# Patient Record
Sex: MALE | Race: WHITE | NOT HISPANIC OR LATINO | Employment: FULL TIME | URBAN - METROPOLITAN AREA
[De-identification: names, ages, dates, MRNs, and addresses within clinical notes are randomized per-mention and may not be internally consistent; named-entity substitution may affect disease eponyms.]

---

## 2017-09-11 ENCOUNTER — OFFICE VISIT (OUTPATIENT)
Dept: CARDIOLOGY | Facility: CLINIC | Age: 47
End: 2017-09-11

## 2017-09-11 VITALS
HEIGHT: 77 IN | SYSTOLIC BLOOD PRESSURE: 116 MMHG | WEIGHT: 252 LBS | HEART RATE: 54 BPM | DIASTOLIC BLOOD PRESSURE: 70 MMHG | BODY MASS INDEX: 29.76 KG/M2

## 2017-09-11 DIAGNOSIS — I48.0 PAF (PAROXYSMAL ATRIAL FIBRILLATION) (HCC): Primary | Chronic | ICD-10-CM

## 2017-09-11 PROCEDURE — 93000 ELECTROCARDIOGRAM COMPLETE: CPT | Performed by: INTERNAL MEDICINE

## 2017-09-11 PROCEDURE — 99203 OFFICE O/P NEW LOW 30 MIN: CPT | Performed by: INTERNAL MEDICINE

## 2017-09-11 RX ORDER — FLECAINIDE ACETATE 150 MG/1
450 TABLET ORAL AS NEEDED
COMMUNITY
Start: 2017-09-11 | End: 2017-09-11

## 2017-09-11 RX ORDER — FLECAINIDE ACETATE 150 MG/1
450 TABLET ORAL AS NEEDED
Qty: 9 TABLET | Refills: 5 | COMMUNITY
Start: 2017-09-11 | End: 2019-03-18 | Stop reason: ALTCHOICE

## 2017-09-11 NOTE — PATIENT INSTRUCTIONS
Atrial Fibrillation  Atrial fibrillation is a type of irregular or rapid heartbeat (arrhythmia). In atrial fibrillation, the heart quivers continuously in a chaotic pattern. This occurs when parts of the heart receive disorganized signals that make the heart unable to pump blood normally. This can increase the risk for stroke, heart failure, and other heart-related conditions. There are different types of atrial fibrillation, including:  · Paroxysmal atrial fibrillation. This type starts suddenly, and it usually stops on its own shortly after it starts.  · Persistent atrial fibrillation. This type often lasts longer than a week. It may stop on its own or with treatment.  · Long-lasting persistent atrial fibrillation. This type lasts longer than 12 months.  · Permanent atrial fibrillation. This type does not go away.  Talk with your health care provider to learn about the type of atrial fibrillation that you have.  CAUSES  This condition is caused by some heart-related conditions or procedures, including:  · A heart attack.  · Coronary artery disease.  · Heart failure.  · Heart valve conditions.  · High blood pressure.  · Inflammation of the sac that surrounds the heart (pericarditis).  · Heart surgery.  · Certain heart rhythm disorders, such as Mustafa-Parkinson-White syndrome.  Other causes include:  · Pneumonia.  · Obstructive sleep apnea.  · Blockage of an artery in the lungs (pulmonary embolism, or PE).  · Lung cancer.  · Chronic lung disease.  · Thyroid problems, especially if the thyroid is overactive (hyperthyroidism).  · Caffeine.  · Excessive alcohol use or illegal drug use.  · Use of some medicines, including certain decongestants and diet pills.  Sometimes, the cause cannot be found.  RISK FACTORS  This condition is more likely to develop in:  · People who are older in age.  · People who smoke.  · People who have diabetes mellitus.  · People who are overweight (obese).  · Athletes who exercise  vigorously.  SYMPTOMS  Symptoms of this condition include:  · A feeling that your heart is beating rapidly or irregularly.  · A feeling of discomfort or pain in your chest.  · Shortness of breath.  · Sudden light-headedness or weakness.  · Getting tired easily during exercise.  In some cases, there are no symptoms.  DIAGNOSIS  Your health care provider may be able to detect atrial fibrillation when taking your pulse. If detected, this condition may be diagnosed with:  · An electrocardiogram (ECG).  · A Holter monitor test that records your heartbeat patterns over a 24-hour period.  · Transthoracic echocardiogram (TTE) to evaluate how blood flows through your heart.  · Transesophageal echocardiogram (SONYA) to view more detailed images of your heart.  · A stress test.  · Imaging tests, such as a CT scan or chest X-ray.  · Blood tests.  TREATMENT  The main goals of treatment are to prevent blood clots from forming and to keep your heart beating at a normal rate and rhythm. The type of treatment that you receive depends on many factors, such as your underlying medical conditions and how you feel when you are experiencing atrial fibrillation.  This condition may be treated with:  · Medicine to slow down the heart rate, bring the heart's rhythm back to normal, or prevent clots from forming.  · Electrical cardioversion. This is a procedure that resets your heart's rhythm by delivering a controlled, low-energy shock to the heart through your skin.  · Different types of ablation, such as catheter ablation, catheter ablation with pacemaker, or surgical ablation. These procedures destroy the heart tissues that send abnormal signals. When the pacemaker is used, it is placed under your skin to help your heart beat in a regular rhythm.  HOME CARE INSTRUCTIONS  · Take over-the counter and prescription medicines only as told by your health care provider.  · If your health care provider prescribed a blood-thinning medicine  (anticoagulant), take it exactly as told. Taking too much blood-thinning medicine can cause bleeding. If you do not take enough blood-thinning medicine, you will not have the protection that you need against stroke and other problems.  · Do not use tobacco products, including cigarettes, chewing tobacco, and e-cigarettes. If you need help quitting, ask your health care provider.  · If you have obstructive sleep apnea, manage your condition as told by your health care provider.  · Do not drink alcohol.  · Do not drink beverages that contain caffeine, such as coffee, soda, and tea.  · Maintain a healthy weight. Do not use diet pills unless your health care provider approves. Diet pills may make heart problems worse.  · Follow diet instructions as told by your health care provider.  · Exercise regularly as told by your health care provider.  · Keep all follow-up visits as told by your health care provider. This is important.  PREVENTION  · Avoid drinking beverages that contain caffeine or alcohol.  · Avoid certain medicines, especially medicines that are used for breathing problems.  · Avoid certain herbs and herbal medicines, such as those that contain ephedra or ginseng.  · Do not use illegal drugs, such as cocaine and amphetamines.  · Do not smoke.  · Manage your high blood pressure.  SEEK MEDICAL CARE IF:  · You notice a change in the rate, rhythm, or strength of your heartbeat.  · You are taking an anticoagulant and you notice increased bruising.  · You tire more easily when you exercise or exert yourself.  SEEK IMMEDIATE MEDICAL CARE IF:  · You have chest pain, abdominal pain, sweating, or weakness.  · You feel nauseous.  · You notice blood in your vomit, bowel movement, or urine.  · You have shortness of breath.  · You suddenly have swollen feet and ankles.  · You feel dizzy.  · You have sudden weakness or numbness of the face, arm, or leg, especially on one side of the body.  · You have trouble speaking,  trouble understanding, or both (aphasia).  · Your face or your eyelid droops on one side.  These symptoms may represent a serious problem that is an emergency. Do not wait to see if the symptoms will go away. Get medical help right away. Call your local emergency services (911 in the [place] Do not drive yourself to the hospital.     This information is not intended to replace advice given to you by your health care provider. Make sure you discuss any questions you have with your health care provider.     Document Released: 12/18/2006 Document Revised: 09/07/2016 Document Reviewed: 04/13/2016  [employer] Patient Education ©2017 [employer]

## 2017-09-11 NOTE — PROGRESS NOTES
Subjective:     Encounter Date:09/11/2017      Patient ID: Alli Bello is a 47 y.o. male.    Chief Complaint:  History of Present Illness    Dear Dr. Ramírez,    I had the pleasure of seeing this patient in the office today for initial consultation.  He comes in for consultation regarding paroxysmal atrial fibrillation.  I appreciate the you sent him to see us.    He is a pleasant gentleman who has a long history of low atrial fibrillation.  He is been managed with a pill in the pocket technique with flecainide.  In the past he has been seen by Dr. Granado and Dr. Desiree Carpenter.  He was last seen in their office in 2015.  Typically, he is been instructed to take 350 mg tablets of flecainide if he goes into atrial fibrillation.  Additionally, he has samples of Bystolic and Pradaxa which she is also been instructed to take if he goes into atrial fibrillation.  In the past, he would also then be seen in the office if he had recurrent atrial fibrillation.  He has episodes atrial fibrillation extremely rarely.  Usually they start after he has done some extreme exercise.    About a month ago he had an episode of recurrent atrial fibrillation.  His current prescription bottle of flecainide was given for 100 mg tablets, not 150 mg tablets.  He took 3 as he has in the past, and it did not get him back into normal rhythm.  He then looked at the bottle a day later and noted that it was 100 mg tablets he was also supposed to take 4.  He was given 90 tablets of the 100 mg tablets of 49.  He then took 4 of the flecainide tablets and then he went back into sinus rhythm.  He did utilize the Bystolic in the meantime he felt better after he took the Bystolic because his heart was not racing.    He does snore heavily.  He says that Dr. Granado told in the past that they were concerned him up for a sleep study but then he never heard anything so he's never followed up on that.    The following portions of the patient's history were  reviewed and updated as appropriate: allergies, current medications, past family history, past medical history, past social history, past surgical history and problem list.    Past Medical History:   Diagnosis Date   • Anxiety attack    • Aortic stenosis    • Atrial fibrillation    • Deep venous thrombosis of calf     right   • THOMAS (dyspnea on exertion)     since fib started   • DVT (deep vein thrombosis) in pregnancy    • Excessive sleepiness     with history of snoring   • Fatigue     mild   • Lightheaded    • Malaise and fatigue    • PAC (premature atrial contraction)     very rare   • PAF (paroxysmal atrial fibrillation)    • Palpitations     gets lightheaded with it   • Pulmonary nodule    • Snoring     no testing   • SOB (shortness of breath)     mildly   • Syncope    • Syncope and collapse        History reviewed. No pertinent surgical history.    Social History     Social History   • Marital status: Single     Spouse name: N/A   • Number of children: N/A   • Years of education: N/A     Occupational History   • Not on file.     Social History Main Topics   • Smoking status: Never Smoker   • Smokeless tobacco: Not on file      Comment: caffine use   • Alcohol use Yes      Comment: 2-3 nights/week   • Drug use: No   • Sexual activity: Not on file     Other Topics Concern   • Not on file     Social History Narrative       Review of Systems   Constitution: Negative for chills, decreased appetite, fever and night sweats.   HENT: Negative for ear discharge, ear pain, hearing loss, nosebleeds and sore throat.    Eyes: Negative for blurred vision, double vision and pain.   Cardiovascular: Negative for cyanosis.   Respiratory: Negative for hemoptysis and sputum production.    Endocrine: Negative for cold intolerance and heat intolerance.   Hematologic/Lymphatic: Negative for adenopathy.   Skin: Negative for dry skin, itching, nail changes, rash and suspicious lesions.   Musculoskeletal: Negative for arthritis, gout,  "muscle cramps, muscle weakness, myalgias and neck pain.   Gastrointestinal: Negative for anorexia, bowel incontinence, constipation, diarrhea, dysphagia, hematemesis and jaundice.   Genitourinary: Negative for bladder incontinence, dysuria, flank pain, frequency, hematuria and nocturia.   Neurological: Negative for focal weakness, numbness, paresthesias and seizures.   Psychiatric/Behavioral: Negative for altered mental status, hallucinations, hypervigilance, suicidal ideas and thoughts of violence.   Allergic/Immunologic: Negative for persistent infections.         ECG 12 Lead  Date/Time: 9/11/2017 10:45 AM  Performed by: RAUL STEINBERG III.  Authorized by: RAUL STEINBERG III   Comparison: compared with previous ECG   Similar to previous ECG  Rhythm: sinus rhythm  Rate: normal  Conduction: conduction normal  ST Segments: ST segments normal  T Waves: T waves normal  QRS axis: normal  Other: no other findings  Clinical impression: normal ECG               Objective:     Vitals:    09/11/17 1015 09/11/17 1016   BP: 116/70 116/70   BP Location: Left arm Right arm   Pulse: 54    Weight: 252 lb (114 kg)    Height: 77\" (195.6 cm)          Physical Exam   Constitutional: He is oriented to person, place, and time. He appears well-developed and well-nourished. No distress.   HENT:   Head: Normocephalic and atraumatic.   Nose: Nose normal.   Mouth/Throat: Oropharynx is clear and moist.   Eyes: Conjunctivae and EOM are normal. Pupils are equal, round, and reactive to light. Right eye exhibits no discharge. Left eye exhibits no discharge.   Neck: Normal range of motion. Neck supple. No tracheal deviation present. No thyromegaly present.   Cardiovascular: Normal rate, regular rhythm, S1 normal, S2 normal, normal heart sounds and normal pulses.  Exam reveals no S3.    Pulmonary/Chest: Effort normal and breath sounds normal. No stridor. No respiratory distress. He exhibits no tenderness.   Abdominal: Soft. Bowel sounds are " normal. He exhibits no distension and no mass. There is no tenderness. There is no rebound and no guarding.   Musculoskeletal: Normal range of motion. He exhibits no tenderness or deformity.   Lymphadenopathy:     He has no cervical adenopathy.   Neurological: He is alert and oriented to person, place, and time. He has normal reflexes.   Skin: Skin is warm and dry. No rash noted. He is not diaphoretic. No erythema.   Psychiatric: He has a normal mood and affect. Thought content normal.       Lab Review:             Performed        Assessment:          Diagnosis Plan   1. PAF (paroxysmal atrial fibrillation)  Ambulatory Referral to Sleep Lab          Plan:       1. Atrial Fibrillation and Atrial Flutter  Assessment  • The patient has paroxysmal atrial fibrillation  • This is valvular in etiology  • The patient's CHADS2-VASc score is 0  • A [id number]-LZNn score of 0 is considered a low risk for a thromboembolic event    He is used a pill in the pocket technique with flecainide with many years.  He has samples for Pradaxa and Bystolic.  He is been instructed to take all 3 if he has an episode of atrial fibrillation and this has worked successfully for him.  This is a reasonable strategy and we will continue it.  Typically, he is been on flecainide 150 mg tablet to take 3 at the time of his breakthrough atrial fibrillation.  He states the last time that his flecainide was renewed, he was instead given 100 mg tablets but given 90, so he has a lot flecainide at home.  His current prescription on the bottle cyst take 400 mg, and so he will do so.  I told him that once that medicine expires, we can go back to the 150 mg tablet, 3 to be taken one time.    Additionally, he was told that because he snores heavily he should have a sleep study that then he was never contacted to schedule that.  That would be a reasonable option, so we will arrange for a sleep study to be performed.    Thank you very much for allowing us to  participate in the care of this pleasant patient.  Please don't hesitate to call if I can be of assistance in any way.      Current Outpatient Prescriptions:   •  CITALOPRAM HYDROBROMIDE PO, Take  by mouth., Disp: , Rfl:   •  flecainide (TAMBOCOR) 150 MG tablet, Take 3 tablets by mouth As Needed., Disp: 9 tablet, Rfl: 5

## 2017-10-16 ENCOUNTER — OFFICE VISIT (OUTPATIENT)
Dept: SLEEP MEDICINE | Facility: HOSPITAL | Age: 47
End: 2017-10-16
Attending: INTERNAL MEDICINE

## 2017-10-16 VITALS
WEIGHT: 254 LBS | DIASTOLIC BLOOD PRESSURE: 79 MMHG | SYSTOLIC BLOOD PRESSURE: 133 MMHG | HEIGHT: 74 IN | BODY MASS INDEX: 32.6 KG/M2 | HEART RATE: 56 BPM

## 2017-10-16 DIAGNOSIS — R06.83 SNORES: Primary | ICD-10-CM

## 2017-10-16 DIAGNOSIS — G47.33 OSA (OBSTRUCTIVE SLEEP APNEA): ICD-10-CM

## 2017-10-16 DIAGNOSIS — G47.10 HYPERSOMNIA: ICD-10-CM

## 2017-10-16 DIAGNOSIS — I48.0 PAF (PAROXYSMAL ATRIAL FIBRILLATION) (HCC): Chronic | ICD-10-CM

## 2017-10-16 DIAGNOSIS — E66.9 OBESITY (BMI 30.0-34.9): ICD-10-CM

## 2017-10-16 PROBLEM — E66.811 OBESITY (BMI 30.0-34.9): Status: ACTIVE | Noted: 2017-10-16

## 2017-10-16 PROCEDURE — G0463 HOSPITAL OUTPT CLINIC VISIT: HCPCS

## 2017-10-16 NOTE — PROGRESS NOTES
Sleep Consultation    Patient Name: Alli Bello  Age/Sex: 47 y.o. male  : 1970  MRN: [id number]    Date of Encounter Visit: 10/22/17  Encounter Provider: Rylee Sanchez MD  Referring Provider:  Aroldo Kelly III, MD  Place of Service: [facility]  Patient Care Team:  aRshida Ramírez MD as PCP - General  Rashida Ramírez MD as PCP - Family Medicine    Subjective:     Reason for Consult: Snoring and history of paroxysmal atrial fibrillation    History of Present Illness:  Alli Bello is a 47 y.o. male is here for evaluation of MIKA due to complaints of snoring for many years and was diagnosed with paroxysmal atrial fibrillation.  Please see sleep questionnaire on page 2 for more details.     Patient complains of daytime fatigue and sleepiness with an [place] Sleepiness Scale (ESS) of 1.  Patient complains of loud snoring.    Patient denies any witnessed apneic events.  Denies any restless sleep or frequent awakenings.  Denies any pain at night or leg jerking or other symptoms to suggest restless leg syndrome.  Patient denies any cataplexy, sleep paralysis or other symptoms to suggest narcolepsy.  Patient denies any parasomnias.  Denies any history of seizure disorder or recent head trauma.  Patient spends adequate amount of time in bed with no evidence of sleep restriction or improper sleep hygiene.  Patient usually goes to bed around 11:30 PM and gets up at 7 AM averaging 7-1/2 hours of sleep per night.  On the weekends he'll go to bed around 1 AM and wake up at 7 AM getting about 7 hours of sleep.  He reports that it takes about 15-20 minutes to fall sleep, but he wakes up feeling tired.  He denies taking any naps.  He denies any rotating her night shift work.   Comorbidities include: Paroxysmal atrial fibrillation and obesity.    Review of Systems:   Positive for irregular heartbeat and anxiety.  A twelve-system review was conducted and was otherwise negative.    Please refer to page 4 of on the sleep questionnaire for more details on system review findings.    Past Medical History:  Past Medical History:   Diagnosis Date   • Anxiety attack    • Aortic stenosis    • Atrial fibrillation    • Deep venous thrombosis of calf     right   • THOMAS (dyspnea on exertion)     since fib started   • DVT (deep vein thrombosis) in pregnancy    • Excessive sleepiness     with history of snoring   • Fatigue     mild   • Lightheaded    • Malaise and fatigue    • PAC (premature atrial contraction)     very rare   • PAF (paroxysmal atrial fibrillation)    • Palpitations     gets lightheaded with it   • Pulmonary nodule    • Snoring     no testing   • SOB (shortness of breath)     mildly   • Syncope    • Syncope and collapse        No past surgical history on file.    Home Medications:     Current Outpatient Prescriptions:   •  CITALOPRAM HYDROBROMIDE PO, Take  by mouth., Disp: , Rfl:   •  flecainide (TAMBOCOR) 150 MG tablet, Take 3 tablets by mouth As Needed., Disp: 9 tablet, Rfl: 5    Allergies:  No Known Allergies    Past Social History:  Social History     Social History   • Marital status: Single     Spouse name: N/A   • Number of children: N/A   • Years of education: N/A     Occupational History   •       Social History Main Topics   • Smoking status: Never Smoker   • Smokeless tobacco: None      Comment: caffine use   • Alcohol use 3.0 - 3.6 oz/week     5 - 6 Standard drinks or equivalent per week      Comment: 2-3 nights/week   • Drug use: No      Comment: Reports drinking one caffeinated beverage per day   • Sexual activity: Not Asked     Other Topics Concern   • None     Social History Narrative       Past Family History:  Family History   Problem Relation Age of Onset   • Heart disease Father    • Hypertension Father    • Coronary artery disease Father      CAD/CHD <61yo   • Hyperlipidemia Father    • Heart attack Father    • Stroke Paternal Grandmother    • Arrhythmia  "Mother    • Atrial fibrillation Mother    • Arrhythmia Brother    • Atrial fibrillation Brother        Objective:   Done and documented on sleep disorders center physical examination sheet   Vital Signs:   Visit Vitals   • /79   • Pulse 56   • Ht 74\" (188 cm)   • Wt 254 lb (115 kg)   • BMI 32.61 kg/m2     Wt Readings from Last 3 Encounters:   10/16/17 254 lb (115 kg)   09/11/17 252 lb (114 kg)     Neck Circumference: 16.5 inches    Physical Exam:   General: AAOx3. Normal mood and affect.   HEENT:  Conjunctiva normal.  PERRLA.  Moist mucous membranes.  Septum midline. Mallampati 3 airways. Normal tongue. Edematous uvula. Normal tonsils. Neck supple trachea midline. Normal thyroid.  LUNGS: Non-labored breathing. CTAB.  No wheezing  HEART: regular rate and rhythm. No murmur. Normal s1/s2  EXTREMITIES: no edema. No cyanosis or clubbing. Normal gait    Diagnostic Data:  none    Assessment and Plan:       ICD-10-CM ICD-9-CM   1. Snores R06.83 786.09   2. PAF (paroxysmal atrial fibrillation) I48.0 427.31   3. Obesity (BMI 30.0-34.9) E66.9 278.00   4. MIKA (obstructive sleep apnea) G47.33 327.23   5. Hypersomnia G47.10 780.54       Recommendations:     Patient was educated in depth about MIKA and cardiovascular consequences if left untreated, including but not limited to CHF, CAD, arrhythmias, CVA, and/or HTN. Education also provided about the diagnostic tools for MIKA, including the polysomnography and the treatment modalities, including the CPAP. Adherence to the CPAP is a key factor in successful treatment of MIKA and the patient was encouraged to contact us in case of problem with the CPAP or the mask that can limit the tolerance of the compliance with the therapy.    Encourage weight loss as can help reduce symptoms of sleep apnea and improve overall health.     Will do a home sleep study and treat accordingly.    Orders Placed This Encounter   Procedures   • Home Sleep Study       Return in about 3 months (around " 1/16/2018).    Rylee Sanchez MD   [facility]   10/22/17  1:35 PM    EMR Dragon/Transcription disclaimer:   Much of this encounter note is an electronic transcription/translation of spoken language to printed text. The electronic translation of spoken language may permit erroneous, or at times, nonsensical words or phrases to be inadvertently transcribed; Although I have reviewed the note for such errors, some may still exist.

## 2017-11-09 ENCOUNTER — HOSPITAL ENCOUNTER (OUTPATIENT)
Dept: SLEEP MEDICINE | Facility: HOSPITAL | Age: 47
Discharge: HOME | End: 2017-11-09
Admitting: INTERNAL MEDICINE

## 2017-11-09 DIAGNOSIS — I48.0 PAF (PAROXYSMAL ATRIAL FIBRILLATION) (HCC): Chronic | ICD-10-CM

## 2017-11-09 DIAGNOSIS — G47.33 OSA (OBSTRUCTIVE SLEEP APNEA): ICD-10-CM

## 2017-11-09 DIAGNOSIS — R06.83 SNORES: ICD-10-CM

## 2017-11-09 DIAGNOSIS — E66.9 OBESITY (BMI 30.0-34.9): ICD-10-CM

## 2017-11-09 PROCEDURE — 95806 SLEEP STUDY UNATT&RESP EFFT: CPT

## 2017-11-17 ENCOUNTER — TELEPHONE (OUTPATIENT)
Dept: SLEEP MEDICINE | Facility: HOSPITAL | Age: 47
End: 2017-11-17

## 2017-11-17 NOTE — TELEPHONE ENCOUNTER
Spoke with patient about sleep study results.  Faxed orders to [facility] sleep and emailed them to let them know it was sent.

## 2018-03-30 ENCOUNTER — APPOINTMENT (OUTPATIENT)
Dept: SLEEP MEDICINE | Facility: HOSPITAL | Age: 48
End: 2018-03-30
Attending: INTERNAL MEDICINE

## 2018-04-20 ENCOUNTER — APPOINTMENT (OUTPATIENT)
Dept: SLEEP MEDICINE | Facility: HOSPITAL | Age: 48
End: 2018-04-20
Attending: INTERNAL MEDICINE

## 2018-05-04 ENCOUNTER — OFFICE VISIT (OUTPATIENT)
Dept: SLEEP MEDICINE | Facility: HOSPITAL | Age: 48
End: 2018-05-04
Attending: INTERNAL MEDICINE

## 2018-05-04 VITALS
DIASTOLIC BLOOD PRESSURE: 75 MMHG | BODY MASS INDEX: 31.66 KG/M2 | SYSTOLIC BLOOD PRESSURE: 115 MMHG | HEART RATE: 58 BPM | HEIGHT: 76 IN | WEIGHT: 260 LBS

## 2018-05-04 DIAGNOSIS — I48.0 PAF (PAROXYSMAL ATRIAL FIBRILLATION) (HCC): Chronic | ICD-10-CM

## 2018-05-04 DIAGNOSIS — E66.9 OBESITY (BMI 30-39.9): ICD-10-CM

## 2018-05-04 DIAGNOSIS — G47.33 OSA ON CPAP: Primary | ICD-10-CM

## 2018-05-04 DIAGNOSIS — Z99.89 OSA ON CPAP: Primary | ICD-10-CM

## 2018-05-04 PROCEDURE — G0463 HOSPITAL OUTPT CLINIC VISIT: HCPCS

## 2018-05-04 NOTE — PROGRESS NOTES
Sleep Disorder Follow Up    Patient Name: Alli Bello  Age/Sex: 48 y.o. male  : 1970  MRN: [id number]    Date of Encounter Visit: 2018   Referring Provider: Rylee Sanchez MD  Place of Service: [facility]  Patient Care Team:  Rashida Ramírez MD as PCP - General  Rashida Ramírez MD as PCP - Family Medicine    PROBLEM LIST:  1. Moderate MIKA, on CPAP  2. History of paroxysmal atrial fibrillation  3. No significant Hypersomnia    History of Present Illness:  Alli Bello is a 48 y.o. male who was last seen in the office on 10/16/17 for P-Afib and suspected MIKA .    Since last visit, patient had a home sleep study that was done on 11/10/17 and it was positive for moderate obstructive sleep apnea and the patient was started on auto CPAP 8-20.  Patient is compliant with the CPAP, he is using it because he wants to and he denies any significant improvement really because he did not have any significant hypersomnia to start with, his [place] Sleepiness Scale on today's visit is 0 and it was in the normal range on the last visit however he has no problem using it or continue to use it given the potential medical benefit from using it.  .  Patient is on auto CPAP and uses it every night with no complaints from the mask or the pressure.  Patient uses a face mask, which does  fit well. Patient denies any significant complaint from air leak.  Or dry mouth.   Patient's equipment supplier is [employer] and last mask replacement was on the initial set up.  Patient sleeps better and has a deeper sleep with the CPAP and feels more energy during the day time.  Current  CPAP setting auto 8-20 cm H20.  [place] Sleepiness Scale (ESS) is 0.  Compliance download was reviewed and documented below.  Weight is up by 5 pounds since last visit.  Other comorbidities include paroxysmal atrial fibrillation     Review of Systems:   A ten-system review was conducted and was negative .    Please refer to the follow up sleep questionnaire page one for details.    Past Medical History:  Past medical, surgical, social, and family history, except as mentioned above, was unchanged from the last visit.     Past Medical History:   Diagnosis Date   • Anxiety attack    • Aortic stenosis    • Atrial fibrillation    • Deep venous thrombosis of calf     right   • THOMAS (dyspnea on exertion)     since fib started   • DVT (deep vein thrombosis) in pregnancy    • Excessive sleepiness     with history of snoring   • Fatigue     mild   • Lightheaded    • Malaise and fatigue    • PAC (premature atrial contraction)     very rare   • PAF (paroxysmal atrial fibrillation)    • Palpitations     gets lightheaded with it   • Pulmonary nodule    • Snoring     no testing   • SOB (shortness of breath)     mildly   • Syncope    • Syncope and collapse        No past surgical history on file.    Home Medications:     Current Outpatient Prescriptions:   •  CITALOPRAM HYDROBROMIDE PO, Take  by mouth., Disp: , Rfl:   •  flecainide (TAMBOCOR) 150 MG tablet, Take 3 tablets by mouth As Needed., Disp: 9 tablet, Rfl: 5    Allergies:  No Known Allergies    Past Social History:  Social History     Social History   • Marital status: Single     Occupational History   •       Social History Main Topics   • Smoking status: Never Smoker      Comment: caffine use   • Alcohol use 3.0 - 3.6 oz/week     5 - 6 Standard drinks or equivalent per week      Comment: 2-3 nights/week   • Drug use: No      Comment: Reports drinking one caffeinated beverage per day     Other Topics Concern   • Not on file       Past Family History:  Family History   Problem Relation Age of Onset   • Heart disease Father    • Hypertension Father    • Coronary artery disease Father      CAD/CHD <59yo   • Hyperlipidemia Father    • Heart attack Father    • Stroke Paternal Grandmother    • Arrhythmia Mother    • Atrial fibrillation Mother    • Arrhythmia Brother    •  "Atrial fibrillation Brother          Objective:   Done and documented on sleep disorders center physical examination sheet, please refer to hand written note on the chart for details about the other pertinent negative findings.    Vital Signs:   Visit Vitals  /75   Pulse 58   Ht 193 cm (76\")   Wt 118 kg (260 lb)   BMI 31.65 kg/m²     Wt Readings from Last 3 Encounters:   05/04/18 118 kg (260 lb)   10/16/17 115 kg (254 lb)   09/11/17 114 kg (252 lb)          Physical Exam:   General: AAOx3. Normal mood and affect.   HEENT:  Conjunctiva normal.  PERRLA.  Moist mucous membranes.  Septum midline. Mallampati 3 airways. Normal tongue. Edematous uvula. Normal tonsils. Neck supple trachea midline. Normal thyroid.  LUNGS: Non-labored breathing. CTAB.  No wheezing  HEART: regular rate and rhythm. No murmur. Normal s1/s2  EXTREMITIES: no edema. No cyanosis or clubbing. Normal gait    Diagnostic Data:  Respiratory Disturbance Events:   Overnight unattended home sleep study was positive for moderate MIKA with AHI of 28.7 with significant positional component and AHI of 39.0 in supine sleep versus normal AHI and other positions.  Clinically insignificant sleep related hypoxemia, despite the desaturation down to 85% during those apnea episode patient had only 1.4 minutes in the hypoxemic range below 89%  Severe snoring patient was recorded snoring during 62.4% of total recording time    Impression:   1. Moderate MIKA with minimal and significant hypoxemia  2. History of paroxysmal atrial fibrillation   Plan:   · Given the severity of the sleep apnea and the underlying comorbidities with paroxysmal atrial fibrillation, treatment is indicated  · Start auto CPAP 8-20 cmH2O  · Follow-up on the clinical response and the compliance download with further modification accordingly        Compliance download :from last 30 days showed 80% compliance with an average nightly use of 6 hours and 5 minutes on auto CPAP 8-20 with a median " pressure of 9.5 and 95th percentile less than 11.7.  Athlete was minimal to none with a median leak of 0.5 L/m and 95th percentile less than 12.6 L/m.  The sleep apnea was very well controlled with residual AHI of 1.1.      Assessment and Plan:       ICD-10-CM ICD-9-CM   1. MIKA on CPAP G47.33 327.23    Z99.89 V46.8   2. Obesity (BMI 30-39.9) E66.9 278.00   3. PAF (paroxysmal atrial fibrillation) I48.0 427.31       Recommendations:     Patient did not have much symptoms to start with so no subjective improvement on the CPAP however he denies any significant discomfort from it and he does not mind to continue to use it given the potential medical benefit from it.  We reviewed the results of the sleep study together with the patient and I explained the abnormal finding and the potential benefit from the CPAP, we reviewed the CPAP download and we pointed out to the patient the improvement in the residual AHI which is down to 1.1.  Given the adequate compliance and the anticipated medical benefit from the CPAP with pre-existing comorbidities like paroxysmal atrial fibrillation the decision is to continue with the CPAP and the patient is agreeable to do so  Patient is to work up on the weight loss  We'll follow-up on a yearly basis or sooner as needed with the instruction to contact me if he became unable to use a CPAP for whatever reason.    No orders of the defined types were placed in this encounter.    Return in about 1 year (around 5/4/2019).    Rylee Sanchez MD   [facility]   05/04/18  12:09 PM    Dictated utilizing Dragon dictation:  Much of this encounter note is an electronic transcription/translation of spoken language to printed text. The electronic translation of spoken language may permit erroneous, or at times, nonsensical words or phrases to be inadvertently transcribed; Although I have reviewed the note for such errors, some may still exist.

## 2019-03-18 ENCOUNTER — OFFICE VISIT (OUTPATIENT)
Dept: ORTHOPEDIC SURGERY | Facility: CLINIC | Age: 49
End: 2019-03-18

## 2019-03-18 ENCOUNTER — HOSPITAL ENCOUNTER (OUTPATIENT)
Dept: MRI IMAGING | Facility: HOSPITAL | Age: 49
Discharge: HOME | End: 2019-03-18
Admitting: NURSE PRACTITIONER

## 2019-03-18 VITALS
WEIGHT: 250 LBS | SYSTOLIC BLOOD PRESSURE: 102 MMHG | DIASTOLIC BLOOD PRESSURE: 62 MMHG | HEART RATE: 80 BPM | HEIGHT: 76 IN | BODY MASS INDEX: 30.44 KG/M2

## 2019-03-18 DIAGNOSIS — S86.002A ACHILLES TENDON INJURY, LEFT, INITIAL ENCOUNTER: ICD-10-CM

## 2019-03-18 DIAGNOSIS — R52 PAIN: Primary | ICD-10-CM

## 2019-03-18 PROCEDURE — 73610 X-RAY EXAM OF ANKLE: CPT | Performed by: NURSE PRACTITIONER

## 2019-03-18 PROCEDURE — 73721 MRI JNT OF LWR EXTRE W/O DYE: CPT

## 2019-03-18 PROCEDURE — 99203 OFFICE O/P NEW LOW 30 MIN: CPT | Performed by: NURSE PRACTITIONER

## 2019-03-18 RX ORDER — CITALOPRAM 20 MG/1
20 TABLET ORAL EVERY MORNING
Refills: 3 | COMMUNITY
Start: 2019-03-02 | End: 2021-04-30 | Stop reason: SDUPTHER

## 2019-03-18 NOTE — PROGRESS NOTES
Subjective:     Patient ID: Alli Bello is a 49 y.o. male.    Chief Complaint:  Pain at achilles tendon, left  History of Present Illness  Alli Bello is a 49-year-old male who presents with a 24-hour onset of pain at the posterior aspect of the ankle, Achilles tendon.  He was playing volleyball last p.m. when he felt a sudden pop at the posterior aspect and began experiencing pain and swelling.  He has elevated, rested, iced and taken ibuprofen for symptom relief.  Increased pain with all ambulatory activities and symptom relief with rest.  Denies that he is expensing numbness or tingling at the left lower extremity.  Positive for multiple ankle injuries at the left and right ankles in the past however denies known injury to his Achilles tendon in the past.  He has continued to ambulate with crutches again nonweightbearing to the left lower extremity tolerating fairly well.  Denies presence of numbness and tingling at the left lower extremity.  Denies other concerns present at this time.     Social History     Occupational History   • Occupation:    Tobacco Use   • Smoking status: Never Smoker   • Smokeless tobacco: Never Used   • Tobacco comment: caffine use   Substance and Sexual Activity   • Alcohol use: Yes     Alcohol/week: 3.0 - 3.6 oz     Types: 5 - 6 Standard drinks or equivalent per week     Comment: 2-3 nights/week   • Drug use: No     Comment: Reports drinking one caffeinated beverage per day   • Sexual activity: Defer      Past Medical History:   Diagnosis Date   • Anxiety attack    • Aortic stenosis    • Atrial fibrillation (CMS/HCC)    • Deep venous thrombosis of calf (CMS/HCC)     right   • THOMAS (dyspnea on exertion)     since fib started   • DVT (deep vein thrombosis) in pregnancy (CMS/HCC)    • Excessive sleepiness     with history of snoring   • Fatigue     mild   • Lightheaded    • Malaise and fatigue    • PAC (premature atrial contraction)     very rare   • PAF (paroxysmal atrial  fibrillation) (CMS/HCC)    • Palpitations     gets lightheaded with it   • Pulmonary nodule    • Snoring     no testing   • SOB (shortness of breath)     mildly   • Syncope    • Syncope and collapse      History reviewed. No pertinent surgical history.    Family History   Problem Relation Age of Onset   • Heart disease Father    • Hypertension Father    • Coronary artery disease Father         CAD/CHD <59yo   • Hyperlipidemia Father    • Heart attack Father    • Stroke Paternal Grandmother    • Arrhythmia Mother    • Atrial fibrillation Mother    • Arrhythmia Brother    • Atrial fibrillation Brother          Review of Systems   Constitutional: Negative for chills, diaphoresis, fever and unexpected weight change.   HENT: Negative for hearing loss, nosebleeds, sore throat and tinnitus.    Eyes: Negative for pain and visual disturbance.   Respiratory: Negative for cough, shortness of breath and wheezing.    Cardiovascular: Negative for chest pain and palpitations.   Gastrointestinal: Negative for abdominal pain, diarrhea, nausea and vomiting.   Endocrine: Negative for cold intolerance, heat intolerance and polydipsia.   Genitourinary: Negative for difficulty urinating, dysuria and hematuria.   Musculoskeletal: Positive for myalgias. Negative for arthralgias.   Skin: Negative for rash and wound.   Allergic/Immunologic: Negative for environmental allergies.   Neurological: Negative for dizziness, syncope and numbness.   Hematological: Does not bruise/bleed easily.   Psychiatric/Behavioral: Negative for dysphoric mood and sleep disturbance. The patient is not nervous/anxious.            Objective:  Physical Exam    Vital signs reviewed.   General: No acute distress.  Eyes: conjunctiva clear; pupils equally round and reactive  ENT: external ears and nose atraumatic; oropharynx clear  CV: no peripheral edema  Resp: normal respiratory effort  Skin: no rashes or wounds; normal turgor  Psych: mood and affect appropriate;  "recent and remote memory intact    Vitals:    03/18/19 1413   BP: 102/62   BP Location: Left arm   Pulse: 80   Weight: 113 kg (250 lb)   Height: 193 cm (76\")         03/18/19  1413   Weight: 113 kg (250 lb)     Body mass index is 30.43 kg/m².      Ortho Exam     Left ankle:  Maximal tenderness over Achilles tendon  Plantar flexion strength 4/5 compared to right side  Positive gonzalez's squeeze test  Positive sensation all distributions left foot, ankle and lower leg with light palpation   2+ DP and pedal pulse  Flex/extend all digits left foot  Mild swelling noted    Imaging:  Left Ankle X-Ray  Indication: Pain  Views: AP, Lateral, Mortise  Findings:  No fracture  No bony lesion  Soft tissues normal  Normal joint spaces    No prior studies available for comparison.    Assessment:        1. Pain    2. Achilles tendon injury, left, initial encounter           Plan:  1.  Discussed plan of care with patient.  Will proceed with MRI of the ankle, stat.  We will plan to have him follow-up with Dr. Baez after completion of testing to discuss results and further plan of care at that time.  Patient verbalized understanding of all information agrees with plan of care encouraged to continue with nonweightbearing to the left lower extremity with crutches.  Denies other concerns that he has at this time.  Orders:  Orders Placed This Encounter   Procedures   • XR Ankle 3+ View Left   • MRI Ankle Left Without Contrast       I ordered and reviewed the FRANCIS today.     Dictated utilizing Dragon dictation   "

## 2019-03-20 ENCOUNTER — TELEPHONE (OUTPATIENT)
Dept: ORTHOPEDIC SURGERY | Facility: CLINIC | Age: 49
End: 2019-03-20

## 2019-03-21 ENCOUNTER — APPOINTMENT (OUTPATIENT)
Dept: MRI IMAGING | Facility: HOSPITAL | Age: 49
End: 2019-03-21

## 2019-03-22 ENCOUNTER — PREP FOR SURGERY (OUTPATIENT)
Dept: OTHER | Facility: HOSPITAL | Age: 49
End: 2019-03-22

## 2019-03-22 ENCOUNTER — TELEPHONE (OUTPATIENT)
Dept: ORTHOPEDIC SURGERY | Facility: CLINIC | Age: 49
End: 2019-03-22

## 2019-03-22 DIAGNOSIS — S86.012A ACHILLES RUPTURE, LEFT: Primary | ICD-10-CM

## 2019-03-22 RX ORDER — OXYCODONE HCL 10 MG/1
10 TABLET, FILM COATED, EXTENDED RELEASE ORAL ONCE
Status: CANCELLED | OUTPATIENT
Start: 2019-03-22 | End: 2019-03-22

## 2019-03-22 RX ORDER — MELOXICAM 7.5 MG/1
15 TABLET ORAL ONCE
Status: CANCELLED | OUTPATIENT
Start: 2019-03-22 | End: 2019-03-22

## 2019-03-22 RX ORDER — CEFAZOLIN SODIUM 2 G/50ML
2 SOLUTION INTRAVENOUS ONCE
Status: CANCELLED | OUTPATIENT
Start: 2019-03-22 | End: 2019-03-22

## 2019-03-22 RX ORDER — ACETAMINOPHEN 500 MG
1000 TABLET ORAL ONCE
Status: CANCELLED | OUTPATIENT
Start: 2019-03-22 | End: 2019-03-22

## 2019-03-25 ENCOUNTER — TELEPHONE (OUTPATIENT)
Dept: ORTHOPEDIC SURGERY | Facility: CLINIC | Age: 49
End: 2019-03-25

## 2019-03-25 ENCOUNTER — APPOINTMENT (OUTPATIENT)
Dept: PREADMISSION TESTING | Facility: HOSPITAL | Age: 49
End: 2019-03-25

## 2019-03-25 ENCOUNTER — OFFICE VISIT (OUTPATIENT)
Dept: CARDIOLOGY | Facility: CLINIC | Age: 49
End: 2019-03-25

## 2019-03-25 VITALS
WEIGHT: 262.4 LBS | BODY MASS INDEX: 31.95 KG/M2 | HEIGHT: 76 IN | SYSTOLIC BLOOD PRESSURE: 106 MMHG | DIASTOLIC BLOOD PRESSURE: 68 MMHG | HEART RATE: 67 BPM | RESPIRATION RATE: 16 BRPM | OXYGEN SATURATION: 97 %

## 2019-03-25 VITALS
HEIGHT: 76 IN | WEIGHT: 264 LBS | HEART RATE: 63 BPM | SYSTOLIC BLOOD PRESSURE: 118 MMHG | DIASTOLIC BLOOD PRESSURE: 80 MMHG | BODY MASS INDEX: 32.15 KG/M2

## 2019-03-25 DIAGNOSIS — S86.012A ACHILLES RUPTURE, LEFT: ICD-10-CM

## 2019-03-25 DIAGNOSIS — Z01.810 PREOP CARDIOVASCULAR EXAM: ICD-10-CM

## 2019-03-25 DIAGNOSIS — I48.0 PAF (PAROXYSMAL ATRIAL FIBRILLATION) (HCC): Primary | Chronic | ICD-10-CM

## 2019-03-25 DIAGNOSIS — Z99.89 OSA ON CPAP: ICD-10-CM

## 2019-03-25 DIAGNOSIS — G47.33 OSA ON CPAP: ICD-10-CM

## 2019-03-25 LAB
ANION GAP SERPL CALCULATED.3IONS-SCNC: 10.7 MMOL/L
APTT PPP: 26.9 SECONDS (ref 24.3–38.1)
BASOPHILS # BLD AUTO: 0.03 10*3/MM3 (ref 0–0.2)
BASOPHILS NFR BLD AUTO: 0.5 % (ref 0–1.5)
BUN BLD-MCNC: 16 MG/DL (ref 6–20)
BUN/CREAT SERPL: 18.4 (ref 7–25)
CALCIUM SPEC-SCNC: 9 MG/DL (ref 8.6–10.5)
CHLORIDE SERPL-SCNC: 105 MMOL/L (ref 98–107)
CO2 SERPL-SCNC: 22.3 MMOL/L (ref 22–29)
CREAT BLD-MCNC: 0.87 MG/DL (ref 0.76–1.27)
DEPRECATED RDW RBC AUTO: 38.5 FL (ref 37–54)
EOSINOPHIL # BLD AUTO: 0.08 10*3/MM3 (ref 0–0.4)
EOSINOPHIL NFR BLD AUTO: 1.3 % (ref 0.3–6.2)
ERYTHROCYTE [DISTWIDTH] IN BLOOD BY AUTOMATED COUNT: 12 % (ref 12.3–15.4)
GFR SERPL CREATININE-BSD FRML MDRD: 94 ML/MIN/1.73
GLUCOSE BLD-MCNC: 107 MG/DL (ref 65–99)
HCT VFR BLD AUTO: 44 % (ref 37.5–51)
HGB BLD-MCNC: 14.8 G/DL (ref 13–17.7)
IMM GRANULOCYTES # BLD AUTO: 0.01 10*3/MM3 (ref 0–0.05)
IMM GRANULOCYTES NFR BLD AUTO: 0.2 % (ref 0–0.5)
INR PPP: 0.9 (ref 0.9–1.1)
LYMPHOCYTES # BLD AUTO: 2.19 10*3/MM3 (ref 0.7–3.1)
LYMPHOCYTES NFR BLD AUTO: 35.4 % (ref 19.6–45.3)
MCH RBC QN AUTO: 29.3 PG (ref 26.6–33)
MCHC RBC AUTO-ENTMCNC: 33.6 G/DL (ref 31.5–35.7)
MCV RBC AUTO: 87.1 FL (ref 79–97)
MONOCYTES # BLD AUTO: 0.57 10*3/MM3 (ref 0.1–0.9)
MONOCYTES NFR BLD AUTO: 9.2 % (ref 5–12)
NEUTROPHILS # BLD AUTO: 3.31 10*3/MM3 (ref 1.4–7)
NEUTROPHILS NFR BLD AUTO: 53.4 % (ref 42.7–76)
NRBC BLD AUTO-RTO: 0 /100 WBC (ref 0–0)
PLATELET # BLD AUTO: 128 10*3/MM3 (ref 140–450)
PMV BLD AUTO: 10.8 FL (ref 6–12)
POTASSIUM BLD-SCNC: 4.5 MMOL/L (ref 3.5–5.2)
PROTHROMBIN TIME: 11.9 SECONDS (ref 12.1–15)
RBC # BLD AUTO: 5.05 10*6/MM3 (ref 4.14–5.8)
SODIUM BLD-SCNC: 138 MMOL/L (ref 136–145)
WBC NRBC COR # BLD: 6.19 10*3/MM3 (ref 3.4–10.8)

## 2019-03-25 PROCEDURE — 36415 COLL VENOUS BLD VENIPUNCTURE: CPT

## 2019-03-25 PROCEDURE — 80048 BASIC METABOLIC PNL TOTAL CA: CPT | Performed by: ORTHOPAEDIC SURGERY

## 2019-03-25 PROCEDURE — 85025 COMPLETE CBC W/AUTO DIFF WBC: CPT | Performed by: ORTHOPAEDIC SURGERY

## 2019-03-25 PROCEDURE — 85610 PROTHROMBIN TIME: CPT | Performed by: ORTHOPAEDIC SURGERY

## 2019-03-25 PROCEDURE — 99213 OFFICE O/P EST LOW 20 MIN: CPT | Performed by: INTERNAL MEDICINE

## 2019-03-25 PROCEDURE — 93010 ELECTROCARDIOGRAM REPORT: CPT | Performed by: INTERNAL MEDICINE

## 2019-03-25 PROCEDURE — 85730 THROMBOPLASTIN TIME PARTIAL: CPT | Performed by: ORTHOPAEDIC SURGERY

## 2019-03-25 PROCEDURE — 93005 ELECTROCARDIOGRAM TRACING: CPT

## 2019-03-25 RX ORDER — FLECAINIDE ACETATE 100 MG/1
450 TABLET ORAL
COMMUNITY
End: 2022-09-23 | Stop reason: DRUGHIGH

## 2019-03-25 RX ORDER — NEBIVOLOL 10 MG/1
10 TABLET ORAL DAILY PRN
COMMUNITY
End: 2022-04-14

## 2019-03-25 RX ORDER — LORAZEPAM 0.5 MG/1
0.25 TABLET ORAL EVERY 8 HOURS PRN
COMMUNITY
End: 2021-04-30 | Stop reason: SDUPTHER

## 2019-03-25 RX ORDER — DABIGATRAN ETEXILATE 150 MG/1
150 CAPSULE ORAL DAILY PRN
COMMUNITY
End: 2019-03-27 | Stop reason: HOSPADM

## 2019-03-25 RX ORDER — MULTIPLE VITAMINS W/ MINERALS TAB 9MG-400MCG
1 TAB ORAL DAILY
COMMUNITY
End: 2020-09-22

## 2019-03-25 NOTE — PROGRESS NOTES
Subjective:     Encounter Date:03/25/2019      Patient ID: Alli Bello is a 49 y.o. male.    Chief Complaint:preop cards  History of Present Illness    Dear Dr. Baez,    I had the pleasure of seeing this patient in the office today for f/u.  He comes in for assessment of cardiac risk prior to surgery which is scheduled for the 27th.  He has a history of paroxysmal atrial fibrillation.  He has left Achilles tendon injury and is scheduled for surgery on the 27th.    He is a pleasant gentleman who has a long history of paroxysmal atrial fibrillation.  He is been managed with a pill in the pocket technique with flecainide.  In the past he has been seen by Dr. Granado and Dr. Desiree Carpenter.  He was last seen in their office in 2015.  Typically, he is been instructed to take 350 mg tablets of flecainide if he goes into atrial fibrillation.  Additionally, he has samples of Bystolic and Pradaxa which he has also been instructed to take if he goes into atrial fibrillation.  In the past, he would also then be seen in the office if he had recurrent atrial fibrillation.  He has episodes atrial fibrillation extremely rarely.  Usually they start after he has done some extreme exercise.  He has to very rarely take the flecainide.    Whenever he takes the flecainide he will go back into sinus rhythm.  All he does not like going into atrial fibrillation, is not really bothered him.  In the past we talked about either consideration of transition over to routine echo therapy or ablation, and he states that so infrequent and it does not really bother him so he is not interested in doing that.    He has not had any other cardiac symptoms.  He denies any chest pain, pressure, tightness, squeezing, or heartburn.  He has not experienced any feeling of  presyncope or syncope.  There has not been any problems with dizziness or lightheadedness.  There has not been any orthopnea or PND, and no problems with lower extremity edema.  He  denies any shortness of breath at rest or with activity and has not had any wheezing.  He has not had any problems with unexplained nausea or vomiting. He has continued to perform daily activities of living without any specific problem or change in the level of activity.  He has not been recently hospitalized for any reason.        The following portions of the patient's history were reviewed and updated as appropriate: allergies, current medications, past family history, past medical history, past social history, past surgical history and problem list.    Past Medical History:   Diagnosis Date   • Achilles tendon injury     LEFT, SCHEDULED FOR REPAIR   • Anxiety attack    • Aortic stenosis    • Atrial fibrillation (CMS/HCC)    • Deep venous thrombosis of calf (CMS/HCC)     right   • THOMAS (dyspnea on exertion)     since fib started   • DVT (deep vein thrombosis) in pregnancy (CMS/HCC)    • Fatigue     mild   • Lightheaded    • Malaise and fatigue    • PAC (premature atrial contraction)     very rare   • PAF (paroxysmal atrial fibrillation) (CMS/HCC)    • Palpitations     gets lightheaded with it   • Pulmonary nodule    • Sleep apnea     TO BRING DOS, CPAP   • Snoring     no testing   • SOB (shortness of breath)     mildly   • Syncope     ONLY WITH A-FIB   • Syncope and collapse        Past Surgical History:   Procedure Laterality Date   • CARDIOVERSION  1998    D/T A FIB   • NO PAST SURGERIES         Social History     Socioeconomic History   • Marital status:      Spouse name: Not on file   • Number of children: Not on file   • Years of education: Not on file   • Highest education level: Not on file   Occupational History   • Occupation:    Tobacco Use   • Smoking status: Never Smoker   • Smokeless tobacco: Never Used   • Tobacco comment: caffine use   Substance and Sexual Activity   • Alcohol use: Yes     Alcohol/week: 3.0 - 3.6 oz     Types: 5 - 6 Standard drinks or equivalent per week      "Comment: 2-3 nights/week   • Drug use: No     Comment: Reports drinking one caffeinated beverage per day   • Sexual activity: Defer       Review of Systems   Constitution: Negative for chills, decreased appetite, fever and night sweats.   HENT: Negative for ear discharge, ear pain, hearing loss, nosebleeds and sore throat.    Eyes: Negative for blurred vision, double vision and pain.   Cardiovascular: Negative for cyanosis.   Respiratory: Negative for hemoptysis and sputum production.    Endocrine: Negative for cold intolerance and heat intolerance.   Hematologic/Lymphatic: Negative for adenopathy.   Skin: Negative for dry skin, itching, nail changes, rash and suspicious lesions.   Musculoskeletal: Negative for arthritis, gout, muscle cramps, muscle weakness, myalgias and neck pain.   Gastrointestinal: Negative for anorexia, bowel incontinence, constipation, diarrhea, dysphagia, hematemesis and jaundice.   Genitourinary: Negative for bladder incontinence, dysuria, flank pain, frequency, hematuria and nocturia.   Neurological: Negative for focal weakness, numbness, paresthesias and seizures.   Psychiatric/Behavioral: Negative for altered mental status, hallucinations, hypervigilance, suicidal ideas and thoughts of violence.   Allergic/Immunologic: Negative for persistent infections.       Procedures       Objective:     Vitals:    03/25/19 1441   BP: 118/80   Pulse: 63   Weight: 120 kg (264 lb)   Height: 193 cm (76\")         Physical Exam   Constitutional: He is oriented to person, place, and time. He appears well-developed and well-nourished. No distress.   HENT:   Head: Normocephalic and atraumatic.   Nose: Nose normal.   Mouth/Throat: Oropharynx is clear and moist.   Eyes: Conjunctivae and EOM are normal. Pupils are equal, round, and reactive to light. Right eye exhibits no discharge. Left eye exhibits no discharge.   Neck: Normal range of motion. Neck supple. No tracheal deviation present. No thyromegaly present. "   Cardiovascular: Normal rate, regular rhythm, S1 normal, S2 normal, normal heart sounds and normal pulses. Exam reveals no S3.   Pulmonary/Chest: Effort normal and breath sounds normal. No stridor. No respiratory distress. He exhibits no tenderness.   Abdominal: Soft. Bowel sounds are normal. He exhibits no distension and no mass. There is no tenderness. There is no rebound and no guarding.   Musculoskeletal: Normal range of motion. He exhibits no tenderness or deformity.   Lymphadenopathy:     He has no cervical adenopathy.   Neurological: He is alert and oriented to person, place, and time. He has normal reflexes.   Skin: Skin is warm and dry. No rash noted. He is not diaphoretic. No erythema.   Psychiatric: He has a normal mood and affect. Thought content normal.       Lab Review:   Results from last 7 days   Lab Units 03/25/19  0957   SODIUM mmol/L 138   POTASSIUM mmol/L 4.5   CHLORIDE mmol/L 105   CO2 mmol/L 22.3   BUN mg/dL 16   CREATININE mg/dL 0.87   GLUCOSE mg/dL 107*   CALCIUM mg/dL 9.0           Performed        Assessment:          Diagnosis Plan   1. PAF (paroxysmal atrial fibrillation) (CMS/[facility])            Plan:       1. Atrial Fibrillation and Atrial Flutter  Assessment  • The patient has paroxysmal atrial fibrillation  • This is valvular in etiology  • The patient's CHADS2-VASc score is 0  • A [id number] score of 0 is considered a low risk for a thromboembolic event    He is used a pill in the pocket technique with flecainide with many years.  He has samples for Pradaxa and Bystolic.  This is a reasonable strategy and we will continue it.   We can always look to adjust his medical therapy to and routine regimen or consider ablation if he has further symptoms  2.  Patient is at low cardiac risk for the anticipated surgical procedure        Thank you very much for allowing us to participate in the care of this pleasant patient.  Please don't hesitate to call if I can be of assistance in any  way.      Current Outpatient Medications:   •  citalopram (CeleXA) 20 MG tablet, 20 mg Every Morning., Disp: , Rfl: 3  •  dabigatran etexilate (PRADAXA) 150 MG capsu, Take 150 mg by mouth Daily As Needed (PRN A-FIB, X 2 DOSES IF STILL IN AFIB, REPORT TO CARDIOLOGIST)., Disp: , Rfl:   •  flecainide (TAMBOCOR) 100 MG tablet, Take 450 mg by mouth Every Other Day As Needed (IF NEEDED TO TAKE 2 DOSES, REPORT TO CARDIOLOGIST, PRN FOR A-FIB)., Disp: , Rfl:   •  LORazepam (ATIVAN) 0.5 MG tablet, Take 0.25 mg by mouth Every 8 (Eight) Hours As Needed for Anxiety., Disp: , Rfl:   •  Multiple Vitamins-Minerals (MULTIVITAMIN WITH MINERALS) tablet tablet, Take 1 tablet by mouth Daily., Disp: , Rfl:   •  nebivolol (BYSTOLIC) 10 MG tablet, Take 10 mg by mouth Daily As Needed (FOR A-FIB FOR 2 DOSES IF STILL IN AFIB, REPORT TO CARDIOLOGIST OFFICE)., Disp: , Rfl:

## 2019-03-25 NOTE — TELEPHONE ENCOUNTER
Patient with a phone in regards to his MRI of his left ankle.  He does indeed have a complete rupture of the proximal portion of his Achilles tendon with retraction and gapping of approximately 4 cm.  We discussed options at length over the phone and patient does wish to proceed with surgery at this point time.    We will plan on proceeding with surgery at patient's request for left Achilles tendon repair and all associated procedures. I reviewed details of procedure with patient today and discussed risks, benefits, alternatives, and limitations of the procedure in laymen's terms with the risks including but not limited to: neurovascular damage, bleeding, infection, wound dehiscence, chronic pain, worsening of pain, re-rupture of Achilles, swelling, loss of motion, weakness, stiffness, instability, DVT, pulmonary embolus, death, stroke, complex regional pain syndrome, and need for additional procedures. No guarantees were given regarding results of surgery.      Patient was given the opportunity to ask and have all questions answered today. The patient voiced understanding of the risks, benefits, and alternative forms of treatment that were discussed and the patient consents to proceed with surgery.     Patient does indicate that he has a history of DVT approximately 5-6 years ago with short-term anticoagulation at that time.  He does also have history of intermittent atrial fibrillation treated with flecainide only when he does notice palpitations.  We will have patient visit PAT prior to surgery

## 2019-03-26 ENCOUNTER — ANESTHESIA EVENT (OUTPATIENT)
Dept: PERIOP | Facility: HOSPITAL | Age: 49
End: 2019-03-26

## 2019-03-27 ENCOUNTER — ANESTHESIA (OUTPATIENT)
Dept: PERIOP | Facility: HOSPITAL | Age: 49
End: 2019-03-27

## 2019-03-27 ENCOUNTER — HOSPITAL ENCOUNTER (OUTPATIENT)
Facility: HOSPITAL | Age: 49
Setting detail: HOSPITAL OUTPATIENT SURGERY
Discharge: HOME | End: 2019-03-27
Attending: ORTHOPAEDIC SURGERY | Admitting: NURSE ANESTHETIST, CERTIFIED REGISTERED

## 2019-03-27 VITALS
TEMPERATURE: 97.1 F | DIASTOLIC BLOOD PRESSURE: 70 MMHG | SYSTOLIC BLOOD PRESSURE: 117 MMHG | HEART RATE: 57 BPM | WEIGHT: 260.2 LBS | RESPIRATION RATE: 16 BRPM | OXYGEN SATURATION: 95 % | BODY MASS INDEX: 31.67 KG/M2

## 2019-03-27 DIAGNOSIS — S86.012A ACHILLES RUPTURE, LEFT: ICD-10-CM

## 2019-03-27 PROCEDURE — 0: Performed by: ORTHOPAEDIC SURGERY

## 2019-03-27 PROCEDURE — 25010000002 ONDANSETRON PER 1 MG: Performed by: NURSE ANESTHETIST, CERTIFIED REGISTERED

## 2019-03-27 PROCEDURE — C1889 IMPLANT/INSERT DEVICE, NOC: HCPCS | Performed by: ORTHOPAEDIC SURGERY

## 2019-03-27 PROCEDURE — 25010000002 MIDAZOLAM PER 1 MG: Performed by: NURSE ANESTHETIST, CERTIFIED REGISTERED

## 2019-03-27 PROCEDURE — S0260 H&P FOR SURGERY: HCPCS | Performed by: ORTHOPAEDIC SURGERY

## 2019-03-27 PROCEDURE — 25010000002 PROPOFOL 10 MG/ML EMULSION: Performed by: NURSE ANESTHETIST, CERTIFIED REGISTERED

## 2019-03-27 PROCEDURE — 25010000003 CEFAZOLIN SODIUM-DEXTROSE 2-3 GM-%(50ML) RECONSTITUTED SOLUTION: Performed by: ORTHOPAEDIC SURGERY

## 2019-03-27 PROCEDURE — 25010000002 DEXAMETHASONE PER 1 MG: Performed by: NURSE ANESTHETIST, CERTIFIED REGISTERED

## 2019-03-27 PROCEDURE — 27650 REPAIR ACHILLES TENDON: CPT | Performed by: ORTHOPAEDIC SURGERY

## 2019-03-27 DEVICE — SUT FW 5 W .5 CIR CUT NDL 48M AR7211: Type: IMPLANTABLE DEVICE | Site: ACHILLES TENDON | Status: FUNCTIONAL

## 2019-03-27 DEVICE — SUT FW #2 W/TPR NDL 1/2 CIR 38IN 97CM 26.5MM BLU: Type: IMPLANTABLE DEVICE | Site: ACHILLES TENDON | Status: FUNCTIONAL

## 2019-03-27 RX ORDER — SODIUM CHLORIDE, SODIUM LACTATE, POTASSIUM CHLORIDE, CALCIUM CHLORIDE 600; 310; 30; 20 MG/100ML; MG/100ML; MG/100ML; MG/100ML
9 INJECTION, SOLUTION INTRAVENOUS CONTINUOUS
Status: DISCONTINUED | OUTPATIENT
Start: 2019-03-27 | End: 2019-03-27 | Stop reason: HOSPADM

## 2019-03-27 RX ORDER — SODIUM CHLORIDE 0.9 % (FLUSH) 0.9 %
3 SYRINGE (ML) INJECTION EVERY 12 HOURS SCHEDULED
Status: DISCONTINUED | OUTPATIENT
Start: 2019-03-27 | End: 2019-03-27 | Stop reason: HOSPADM

## 2019-03-27 RX ORDER — MELOXICAM 7.5 MG/1
15 TABLET ORAL ONCE
Status: COMPLETED | OUTPATIENT
Start: 2019-03-27 | End: 2019-03-27

## 2019-03-27 RX ORDER — PROPOFOL 10 MG/ML
VIAL (ML) INTRAVENOUS CONTINUOUS PRN
Status: DISCONTINUED | OUTPATIENT
Start: 2019-03-27 | End: 2019-03-27 | Stop reason: SURG

## 2019-03-27 RX ORDER — OXYCODONE HYDROCHLORIDE AND ACETAMINOPHEN 5; 325 MG/1; MG/1
1 TABLET ORAL EVERY 4 HOURS PRN
Qty: 42 TABLET | Refills: 0 | Status: SHIPPED | OUTPATIENT
Start: 2019-03-27 | End: 2019-05-21

## 2019-03-27 RX ORDER — PROPOFOL 10 MG/ML
VIAL (ML) INTRAVENOUS AS NEEDED
Status: DISCONTINUED | OUTPATIENT
Start: 2019-03-27 | End: 2019-03-27 | Stop reason: SURG

## 2019-03-27 RX ORDER — LIDOCAINE HYDROCHLORIDE 10 MG/ML
0.5 INJECTION, SOLUTION EPIDURAL; INFILTRATION; INTRACAUDAL; PERINEURAL ONCE AS NEEDED
Status: COMPLETED | OUTPATIENT
Start: 2019-03-27 | End: 2019-03-27

## 2019-03-27 RX ORDER — SODIUM CHLORIDE 0.9 % (FLUSH) 0.9 %
1-10 SYRINGE (ML) INJECTION AS NEEDED
Status: DISCONTINUED | OUTPATIENT
Start: 2019-03-27 | End: 2019-03-27 | Stop reason: HOSPADM

## 2019-03-27 RX ORDER — DEXAMETHASONE SODIUM PHOSPHATE 4 MG/ML
8 INJECTION, SOLUTION INTRA-ARTICULAR; INTRALESIONAL; INTRAMUSCULAR; INTRAVENOUS; SOFT TISSUE ONCE
Status: COMPLETED | OUTPATIENT
Start: 2019-03-27 | End: 2019-03-27

## 2019-03-27 RX ORDER — SODIUM CHLORIDE, SODIUM LACTATE, POTASSIUM CHLORIDE, CALCIUM CHLORIDE 600; 310; 30; 20 MG/100ML; MG/100ML; MG/100ML; MG/100ML
100 INJECTION, SOLUTION INTRAVENOUS CONTINUOUS
Status: DISCONTINUED | OUTPATIENT
Start: 2019-03-27 | End: 2019-03-27 | Stop reason: HOSPADM

## 2019-03-27 RX ORDER — PREGABALIN 75 MG/1
75 CAPSULE ORAL ONCE
Status: DISCONTINUED | OUTPATIENT
Start: 2019-03-27 | End: 2019-03-27 | Stop reason: HOSPADM

## 2019-03-27 RX ORDER — SODIUM CHLORIDE 9 MG/ML
40 INJECTION, SOLUTION INTRAVENOUS AS NEEDED
Status: DISCONTINUED | OUTPATIENT
Start: 2019-03-27 | End: 2019-03-27 | Stop reason: HOSPADM

## 2019-03-27 RX ORDER — ACETAMINOPHEN 500 MG
1000 TABLET ORAL ONCE
Status: COMPLETED | OUTPATIENT
Start: 2019-03-27 | End: 2019-03-27

## 2019-03-27 RX ORDER — MAGNESIUM HYDROXIDE 1200 MG/15ML
LIQUID ORAL AS NEEDED
Status: DISCONTINUED | OUTPATIENT
Start: 2019-03-27 | End: 2019-03-27 | Stop reason: HOSPADM

## 2019-03-27 RX ORDER — MIDAZOLAM HYDROCHLORIDE 1 MG/ML
1 INJECTION INTRAMUSCULAR; INTRAVENOUS
Status: DISCONTINUED | OUTPATIENT
Start: 2019-03-27 | End: 2019-03-27 | Stop reason: HOSPADM

## 2019-03-27 RX ORDER — ONDANSETRON 2 MG/ML
4 INJECTION INTRAMUSCULAR; INTRAVENOUS ONCE AS NEEDED
Status: COMPLETED | OUTPATIENT
Start: 2019-03-27 | End: 2019-03-27

## 2019-03-27 RX ORDER — ONDANSETRON 2 MG/ML
4 INJECTION INTRAMUSCULAR; INTRAVENOUS ONCE AS NEEDED
Status: DISCONTINUED | OUTPATIENT
Start: 2019-03-27 | End: 2019-03-27 | Stop reason: HOSPADM

## 2019-03-27 RX ORDER — OXYCODONE HCL 10 MG/1
10 TABLET, FILM COATED, EXTENDED RELEASE ORAL ONCE
Status: COMPLETED | OUTPATIENT
Start: 2019-03-27 | End: 2019-03-27

## 2019-03-27 RX ORDER — CEFAZOLIN SODIUM 2 G/50ML
2 SOLUTION INTRAVENOUS ONCE
Status: COMPLETED | OUTPATIENT
Start: 2019-03-27 | End: 2019-03-27

## 2019-03-27 RX ORDER — ACETAMINOPHEN 500 MG
1000 TABLET ORAL ONCE
Status: DISCONTINUED | OUTPATIENT
Start: 2019-03-27 | End: 2019-03-27 | Stop reason: HOSPADM

## 2019-03-27 RX ORDER — LIDOCAINE HYDROCHLORIDE 20 MG/ML
INJECTION, SOLUTION INFILTRATION; PERINEURAL AS NEEDED
Status: DISCONTINUED | OUTPATIENT
Start: 2019-03-27 | End: 2019-03-27 | Stop reason: SURG

## 2019-03-27 RX ORDER — OXYCODONE HYDROCHLORIDE AND ACETAMINOPHEN 5; 325 MG/1; MG/1
1 TABLET ORAL ONCE
Status: COMPLETED | OUTPATIENT
Start: 2019-03-27 | End: 2019-03-27

## 2019-03-27 RX ORDER — MIDAZOLAM HYDROCHLORIDE 1 MG/ML
2 INJECTION INTRAMUSCULAR; INTRAVENOUS
Status: DISCONTINUED | OUTPATIENT
Start: 2019-03-27 | End: 2019-03-27 | Stop reason: HOSPADM

## 2019-03-27 RX ORDER — ONDANSETRON 4 MG/1
4 TABLET, FILM COATED ORAL EVERY 8 HOURS PRN
Qty: 30 TABLET | Refills: 0 | Status: SHIPPED | OUTPATIENT
Start: 2019-03-27 | End: 2019-05-21

## 2019-03-27 RX ORDER — BUPIVACAINE HYDROCHLORIDE 5 MG/ML
INJECTION, SOLUTION EPIDURAL; INTRACAUDAL AS NEEDED
Status: DISCONTINUED | OUTPATIENT
Start: 2019-03-27 | End: 2019-03-27 | Stop reason: SURG

## 2019-03-27 RX ORDER — LIDOCAINE HYDROCHLORIDE 10 MG/ML
INJECTION, SOLUTION EPIDURAL; INFILTRATION; INTRACAUDAL; PERINEURAL AS NEEDED
Status: DISCONTINUED | OUTPATIENT
Start: 2019-03-27 | End: 2019-03-27 | Stop reason: HOSPADM

## 2019-03-27 RX ADMIN — PROPOFOL 100 MCG/KG/MIN: 10 INJECTION, EMULSION INTRAVENOUS at 07:44

## 2019-03-27 RX ADMIN — FAMOTIDINE 20 MG: 10 INJECTION, SOLUTION INTRAVENOUS at 06:49

## 2019-03-27 RX ADMIN — CEFAZOLIN SODIUM 2 G: 2 SOLUTION INTRAVENOUS at 07:47

## 2019-03-27 RX ADMIN — DEXAMETHASONE SODIUM PHOSPHATE 8 MG: 4 INJECTION, SOLUTION INTRAMUSCULAR; INTRAVENOUS at 06:49

## 2019-03-27 RX ADMIN — ACETAMINOPHEN 1000 MG: 500 TABLET, FILM COATED ORAL at 06:12

## 2019-03-27 RX ADMIN — SODIUM CHLORIDE, POTASSIUM CHLORIDE, SODIUM LACTATE AND CALCIUM CHLORIDE 9 ML/HR: 600; 310; 30; 20 INJECTION, SOLUTION INTRAVENOUS at 06:10

## 2019-03-27 RX ADMIN — PROPOFOL 50 MG: 10 INJECTION, EMULSION INTRAVENOUS at 07:44

## 2019-03-27 RX ADMIN — LIDOCAINE HYDROCHLORIDE 0.5 ML: 10 INJECTION, SOLUTION EPIDURAL; INFILTRATION; INTRACAUDAL; PERINEURAL at 06:10

## 2019-03-27 RX ADMIN — PROPOFOL 30 MG: 10 INJECTION, EMULSION INTRAVENOUS at 07:48

## 2019-03-27 RX ADMIN — MELOXICAM 15 MG: 7.5 TABLET ORAL at 06:12

## 2019-03-27 RX ADMIN — ONDANSETRON 4 MG: 2 INJECTION, SOLUTION INTRAMUSCULAR; INTRAVENOUS at 06:49

## 2019-03-27 RX ADMIN — OXYCODONE HYDROCHLORIDE AND ACETAMINOPHEN 1 TABLET: 5; 325 TABLET ORAL at 10:25

## 2019-03-27 RX ADMIN — OXYCODONE HYDROCHLORIDE 10 MG: 10 TABLET, FILM COATED, EXTENDED RELEASE ORAL at 06:12

## 2019-03-27 RX ADMIN — PROPOFOL 20 MG: 10 INJECTION, EMULSION INTRAVENOUS at 07:52

## 2019-03-27 RX ADMIN — MIDAZOLAM HYDROCHLORIDE 2 MG: 1 INJECTION, SOLUTION INTRAMUSCULAR; INTRAVENOUS at 06:54

## 2019-03-27 RX ADMIN — LIDOCAINE HYDROCHLORIDE 100 MG: 20 INJECTION, SOLUTION INFILTRATION; PERINEURAL at 07:41

## 2019-03-27 RX ADMIN — BUPIVACAINE HYDROCHLORIDE 30 ML: 5 INJECTION, SOLUTION EPIDURAL; INTRACAUDAL; PERINEURAL at 07:00

## 2019-03-27 RX ADMIN — BUPIVACAINE HYDROCHLORIDE 20 ML: 5 INJECTION, SOLUTION EPIDURAL; INTRACAUDAL; PERINEURAL at 07:07

## 2019-03-27 NOTE — ANESTHESIA PREPROCEDURE EVALUATION
Anesthesia Evaluation     Patient summary reviewed and Nursing notes reviewed   no history of anesthetic complications:  NPO Solid Status: > 8 hours  NPO Liquid Status: > 2 hours           Airway   Mallampati: II  TM distance: >3 FB  Neck ROM: full  No difficulty expected  Dental      Pulmonary     breath sounds clear to auscultation  (+) sleep apnea on CPAP,   Cardiovascular   Exercise tolerance: good (4-7 METS)    ECG reviewed  Rhythm: regular  Rate: normal    (+) valvular problems/murmurs AS, dysrhythmias (in and out of afib pt states last episode was 6wks ago) Atrial Fib, DVT (right ) resolved,     ROS comment: Narrative     HEART RATE= 59  bpm  RR Interval= 1012  ms  FL Interval= 161  ms  P Horizontal Axis= 31  deg  P Front Axis= 14  deg  QRSD Interval= 97  ms  QT Interval= 415  ms  QRS Axis= 11  deg  T Wave Axis= 12  deg  - NORMAL ECG -  Sinus rhythm  No change from prior tracing  Electronically Signed By: Poppy Gaytan ([facility]) 25-Mar-2019 12:08:07  Date and Time of Study: 2019-03-25 09:39:37        Neuro/Psych  (+) syncope (related to afib 3yrs ago), psychiatric history Anxiety,     GI/Hepatic/Renal/Endo    (+) obesity,       Musculoskeletal (-) negative ROS    Abdominal   (+) obese,    Substance History   (+) alcohol use (3-5drinks per week),      OB/GYN          Other        ROS/Med Hx Other: CL finished at 5am                  Anesthesia Plan    ASA 2     MAC and regional     intravenous induction   Anesthetic plan, all risks, benefits, and alternatives have been provided, discussed and informed consent has been obtained with: patient.  Use of blood products discussed with patient  Consented to blood products.

## 2019-03-27 NOTE — ANESTHESIA POSTPROCEDURE EVALUATION
Patient: Alli Bello    Procedure Summary     Date:  03/27/19 Room / Location:   [facility] /  [facility]    Anesthesia Start:  0738 Anesthesia Stop:  0924    Procedure:  ACHILLES TENDON REPAIR (Left Ankle) Diagnosis:       Achilles rupture, left      (Achilles rupture, left [S86.012A])    Surgeon:  Angel Baez MD Provider:  Bridget Bryant CRNA    Anesthesia Type:  MAC, regional ASA Status:  2          Anesthesia Type: MAC, regional  Last vitals  BP   117/70 (03/27/19 1040)   Temp   97.1 °F (36.2 °C) (03/27/19 0925)   Pulse   57 (03/27/19 1040)   Resp   16 (03/27/19 1040)     SpO2   95 % (03/27/19 1040)     Post Anesthesia Care and Evaluation    Patient location during evaluation: bedside  Patient participation: complete - patient participated  Level of consciousness: awake  Pain score: 2  Pain management: adequate  Airway patency: patent  Anesthetic complications: No anesthetic complications  PONV Status: none  Cardiovascular status: acceptable  Respiratory status: acceptable  Hydration status: acceptable    Comments: Patient evaluated prior to discharge but note entered late.

## 2019-03-27 NOTE — ANESTHESIA PROCEDURE NOTES
Peripheral Block      Patient reassessed immediately prior to procedure    Patient location during procedure: pre-op  Start time: 3/27/2019 6:57 AM  Stop time: 3/27/2019 7:00 AM  Reason for block: procedure for pain, at surgeon's request and post-op pain management  Performed by  CRNA: Bridget Bryant CRNA  Preanesthetic Checklist  Completed: patient identified, site marked, surgical consent, pre-op evaluation, timeout performed, IV checked, risks and benefits discussed and monitors and equipment checked  Prep:  Pt Position: right lateral decubitus  Sterile barriers:cap and gloves  Prep: ChloraPrep  Patient monitoring: blood pressure monitoring, continuous pulse oximetry and EKG  Procedure  Sedation:yes    Guidance:nerve stimulator and landmark technique    Laterality:left  Block Type:sciatic  Injection Technique:single-shot  Needle Type:echogenic  Needle Gauge:21 G  Resistance on Injection: none    Post Assessment  Injection Assessment: negative aspiration for heme, no paresthesia on injection and incremental injection  Patient Tolerance:comfortable throughout block  Complications:no  Additional Notes  Lidocaine 1% skin infiltration 1ml

## 2019-03-27 NOTE — ADDENDUM NOTE
Addendum  created 03/27/19 1405 by Bridget Bryant, DACIA    Intraprocedure SmartForms edited      
no

## 2019-04-05 ENCOUNTER — TELEPHONE (OUTPATIENT)
Dept: ORTHOPEDIC SURGERY | Facility: CLINIC | Age: 49
End: 2019-04-05

## 2019-04-05 ENCOUNTER — OFFICE VISIT (OUTPATIENT)
Dept: ORTHOPEDIC SURGERY | Facility: CLINIC | Age: 49
End: 2019-04-05

## 2019-04-05 DIAGNOSIS — Z98.890 STATUS POST ACHILLES TENDON REPAIR: Primary | ICD-10-CM

## 2019-04-05 PROCEDURE — 99024 POSTOP FOLLOW-UP VISIT: CPT | Performed by: ORTHOPAEDIC SURGERY

## 2019-04-05 NOTE — TELEPHONE ENCOUNTER
PT called to schedule physical therapy with pt, he did not want to start until the 22nd of April.  Is that too far out or does he need to start next week?

## 2019-04-05 NOTE — PROGRESS NOTES
CC: F/u s/p left Achilles repair, DOS 3/27/19    Interval history: Patient returns to clinic stating that left ankle and foot have been doing well in immobilization, tolerating splint well without difficulties. Denies numbness or tingling to the lower extremity, no fevers chills or sweats, no drainage from the margins of the splint. Ambulating with crutches and nonweightbearing with splint intact. The patient has been taking Eliquis. He also complains of pain and discomfort on the lateral aspect of the right foot.     Exam:    Left ankle- surgical incision clean dry and intact, sutures in place   No surrounding erythema or fluctuance   No significant calf pain or swelling   Flex and extend toes without difficulty   Positive sensation light touch all distributions  left foot   Achilles tendon palpated to be in continuity   No evidence of heel decubitus or pressure ulcer    Impression: s/p left Achilles repair    Plan:  1.  May begin partial weightbearing with use of crutches and boot at this time.  Can transition to weightbearing as tolerated at the four-week feliz after surgery.  2.  Transition from splint to walking boot today with heel wedges placed and ACE bandage.   3.  Will start into physical therapy with Achilles rehabilitation protocol  4.  Follow-up in office in 2 weeks for reevaluation.  All questions answered today  5.  Continue taking Eliquis as prescribed.  6.  Will use Dr. Ho's shoe inserts to help with pain in the lateral aspect of the right foot.    No orders of the defined types were placed in this encounter.      Orders Placed This Encounter   Procedures   • Ambulatory Referral to Physical Therapy     Referral Priority:   Routine     Referral Type:   Therapy     Referral Reason:   Specialty Services Required     Requested Specialty:   Physical Therapy     Number of Visits Requested:   1     By signing my name here, I Angel Baez MD, attest that all documentation on 04/05/19 11:47 AM was  done under the direction and approval of Dr. Angel Baez.    I, Dr. Angel Baez, attest that all documentation done by Nancy, Skinny Hoyos, was done under my direction.

## 2019-04-19 ENCOUNTER — OFFICE VISIT (OUTPATIENT)
Dept: ORTHOPEDIC SURGERY | Facility: CLINIC | Age: 49
End: 2019-04-19

## 2019-04-19 VITALS — BODY MASS INDEX: 31.66 KG/M2 | HEIGHT: 76 IN | WEIGHT: 260 LBS

## 2019-04-19 DIAGNOSIS — Z98.890 STATUS POST ACHILLES TENDON REPAIR: Primary | ICD-10-CM

## 2019-04-19 PROCEDURE — 99024 POSTOP FOLLOW-UP VISIT: CPT | Performed by: ORTHOPAEDIC SURGERY

## 2019-04-19 NOTE — PROGRESS NOTES
CC: F/u s/p left Achilles repair, DOS 3/27/2019    Interval history: Patient returns to clinic stating that left ankle and foot have continued to do well, tolerating boot without difficulties.  Denies numbness or tingling to the lower extremity, no fevers chills or sweats. Ambulating with crutches and weightbearing as tolerated in boot.  He has not started into physical therapy yet.      Exam:   Left ankle- incision well healed   No surrounding erythema or fluctuance   Ankle DF 10 degrees,  PF 40 degrees   No significant calf pain or swelling   Flex and extend toes without difficulty   Positive sensation light touch all distributions  left foot   Achilles tendon palpated to be in continuity   No evidence of heel decubitus or pressure ulcer      Impression: s/p left Achilles repair    Plan:  1.  May discontinue heel wedge at completely 6 week feliz, can start weaning off heel wedges sequentially with physical therapy  2.  Start treatment with physical therapy for strengthening and increasing range of motion with Achilles rehabilitation protocol  3. Follow-up in office in 4 weeks for reevaluation. Goal is to discontinue boot at that time  All questions answered today    No orders of the defined types were placed in this encounter.      No orders of the defined types were placed in this encounter.

## 2019-04-22 ENCOUNTER — HOSPITAL ENCOUNTER (OUTPATIENT)
Dept: PHYSICAL THERAPY | Facility: HOSPITAL | Age: 49
Setting detail: THERAPIES SERIES
Discharge: HOME | End: 2019-04-22

## 2019-04-22 DIAGNOSIS — Z98.890 STATUS POST ACHILLES TENDON REPAIR: Primary | ICD-10-CM

## 2019-04-22 PROCEDURE — 97161 PT EVAL LOW COMPLEX 20 MIN: CPT | Performed by: PHYSICAL THERAPIST

## 2019-04-22 PROCEDURE — 97110 THERAPEUTIC EXERCISES: CPT | Performed by: PHYSICAL THERAPIST

## 2019-04-22 NOTE — THERAPY EVALUATION
Outpatient Physical Therapy Ortho Initial Evaluation   Chayito Phillips     Patient Name: Alli Bello  : 1970  MRN: [id number]  Today's Date: 2019      Visit Date: 2019    Patient Active Problem List   Diagnosis   • PAF (paroxysmal atrial fibrillation) (CMS/HCC)   • Snores   • Obesity (BMI 30.0-34.9)   • MIKA on CPAP   • Obesity (BMI 30-39.9)   • Achilles rupture, left   • Status post Achilles tendon repair        Past Medical History:   Diagnosis Date   • Achilles tendon injury     LEFT, SCHEDULED FOR REPAIR   • Anxiety attack    • Aortic stenosis    • Atrial fibrillation (CMS/HCC)    • Deep venous thrombosis of calf (CMS/HCC)     right   • THOMAS (dyspnea on exertion)     since fib started   • Fatigue     mild   • Lightheaded    • Malaise and fatigue    • PAC (premature atrial contraction)     very rare   • PAF (paroxysmal atrial fibrillation) (CMS/HCC)    • Palpitations     gets lightheaded with it   • Pulmonary nodule    • Sleep apnea     TO BRING DOS, CPAP   • Snoring     no testing   • SOB (shortness of breath)     mildly   • Syncope     ONLY WITH A-FIB   • Syncope and collapse         Past Surgical History:   Procedure Laterality Date   • ACHILLES TENDON SURGERY Left 3/27/2019    Procedure: ACHILLES TENDON REPAIR;  Surgeon: Angel Baez MD;  Location: [facility];  Service: Orthopedics   • CARDIOVERSION      D/T A FIB   • NO PAST SURGERIES         Visit Dx:     ICD-10-CM ICD-9-CM   1. Status post Achilles tendon repair Z98.890 V45.89         Patient History     Row Name 19 1140             History    Chief Complaint  Difficulty Walking;Difficulty with daily activities;Pain;Joint swelling;Joint stiffness  -GC      Type of Pain  Ankle pain left  -GC      Brief Description of Current Complaint  Pt states he injured his left Achilles approximately 5 weeks ago while playing volleyball. He pushed off his left foot to change directions and he felt a pop in his calf. He followed up  with Dr. Baez who did x-rays and an MRI. He was diagnosed with an Achilles rupture and underwent Symone Tendon repair. He was NWBing until 4/19/2019. He is now WBAT left LE in a boot and has been referred for therapy.  -      Patient/Caregiver Goals  Relieve pain;Return to prior level of function;Improve mobility;Improve strength;Decrease swelling  -      Patient's Rating of General Health  Good  -      Hand Dominance  right-handed  -      What clinical tests have you had for this problem?  X-ray;MRI  -      Results of Clinical Tests  Achilles rupture  -         Pain     Pain Location  Ankle left Achilles  -      Pain at Present  0 no pain at rest  -GC      Pain at Best  0  -GC      Pain at Worst  3  -GC      Pain Frequency  Intermittent  -      Pain Description  Aching;Discomfort;Tightness;Sore  -      What Performance Factors Make the Current Problem(s) WORSE?  Pt has some discomfort when he is on his feet for long periods  -      What Performance Factors Make the Current Problem(s) BETTER?  Pt feels better if he gets off his feet and rests  -      Difficulties at work?  Pt has some difficulty being on his feet while walking at work  -      Difficulties with ADL's?  Pt has some difficulty being on his feet for normal ADLs  -         Daily Activities    Primary Language  English  -      How does patient learn best?  Reading  -      Teaching needs identified  Home Exercise Program;Falls Prevention  -      Patient is concerned about/has problems with  Climbing Stairs;Flexibility;Performing home management (household chores, shopping, care of dependents);Performing job responsibilities/community activities (work, school,;Performing sports, recreation, and play activities;Standing;Walking  -      Does patient have problems with the following?  None  -      Barriers to learning  None  -      Functional Status  mobility issues preventing performance of daily activities  -       Pt Participated in POC and Goals  Yes  -GC         Safety    Are you being hurt, hit, or frightened by anyone at home or in your life?  No  -GC      Are you being neglected by a caregiver  No  -GC        User Key  (r) = Recorded By, (t) = Taken By, (c) = Cosigned By    Initials Name Provider Type    GC Everton Valles, PT Physical Therapist          PT Ortho     Row Name 04/22/19 1140       Posture/Observations    Posture/Observations Comments  Pt isnitally seen in tall fracture boot left LE. He has moderate edema left lower leg with mild gastroc atrophy noted. The surgical site is nicely healed with good scar mobility.  -GC       Sensory Screen for Light Touch- Lower Quarter Clearing    L2 (anterior mid thigh)  Left:;Intact  -GC    L3 (distal anterior thigh)  Left:;Intact  -GC    L4 (medial lower leg/foot)  Left:;Intact  -GC    L5 (lateral lower leg/great toe)  Left:;Intact  -GC    S1 (bottom of foot)  Left:;Intact  -GC       Foot/Ankle Palpation    Achilles' Tendon  Left:;Tender  -GC       Ankle/Foot Special Tests    Sanders test (Achilles’ tendon rupture)  Left:;Negative  -GC    Bakari’s sign (DVT)  Left:;Negative  -GC       Left Lower Ext    Lt Knee Extension/Flexion AROM  WFL  -GC    Lt Ankle Dorsiflexion AROM  2 degrees  -GC    Lt Ankle Plantarflexion AROM  48 degrees  -GC    Lt Ankle Inversion AROM  41 degrees  -GC    Lt Ankle Eversion AROM  14 degrees  -GC       MMT Left Lower Ext    Lt Knee Extension MMT, Gross Movement  (5/5) normal  -GC    Lt Knee Flexion MMT, Gross Movement  (5/5) normal  -GC    Lt Ankle Plantarflexion MMT, Gross Movement  (3/5) fair  -GC    Lt Ankle Dorsiflexion MMT, Gross Movement  (4+/5) good plus  -GC    Lt Ankle Subtalar Inversion MMT, Gross Movement  (4+/5) good plus  -GC    Lt Ankle Subtalar Eversion MMT, Gross Movement  (4+/5) good plus  -GC       Transfers    Comment (Transfers)  Pt is independent with all bed mobility and transfers  -GC       Gait/Stairs Assessment/Training     Comment (Gait/Stairs)  Pt ambulates in tall, fixed fracture boot left LE without assitive devices. He has significant antalgic gait due to the boot.  -      User Key  (r) = Recorded By, (t) = Taken By, (c) = Cosigned By    Initials Name Provider Type    Everton Hooper PT Physical Therapist                      Therapy Education  Given: HEP, Symptoms/condition management, Pain management  Program: New  How Provided: Verbal, Demonstration, Written  Provided to: Patient  Level of Understanding: Teach back education performed, Verbalized, Demonstrated     PT OP Goals     Row Name 04/22/19 1140          PT Short Term Goals    STG Date to Achieve  05/13/19  -     STG 1  Decrease left ankle pain to 1-2/10 with activity.  -     STG 2  Decrease left lower leg edema to WFL with testing.  -     STG 3  Increased left ankle DF AROM to 10 degrees with testing.  -     STG 4  Pt will be independent with his HEP issued by this therapist.  -        Long Term Goals    LTG Date to Achieve  06/03/19  -     LTG 1  Decrease left ankle pain to 0-1/10 with activity.  -     LTG 2  Increase left ankle AROM to WFL all planes with testing.  -     LTG 3  Increase left ankle strength to 5/5 all planes with testing.  -     LTG 4  Pt will ambulate normally on levels and stairs without boot or assistive devices.  -     LTG 5  Pt will be independent with all ADLs and have a LEFS score > 70.  -        Time Calculation    PT Goal Re-Cert Due Date  05/20/19  -       User Key  (r) = Recorded By, (t) = Taken By, (c) = Cosigned By    Initials Name Provider Type    Everton Hooper PT Physical Therapist          PT Assessment/Plan     Row Name 04/22/19 1140          PT Assessment    Functional Limitations  Impaired gait;Limitation in home management;Limitations in community activities;Limitations in functional capacity and performance;Performance in leisure activities;Performance in sport activities;Performance in work  activities  -GC     Impairments  Edema;Gait;Impaired flexibility;Range of motion;Pain;Muscle strength  -     Assessment Comments  Pt presents approximately 4 weeks s/p left Achilles Tendon rupture and reconstruction. He rates his pain up to 3/10 with activity. He has moderate edema left lower leg, decreased left ankle ROM, decreased left ankle strength, decreased ambulatory status, and decreased function secondary to the above.  -     Rehab Potential  Good  -GC     Patient/caregiver participated in establishment of treatment plan and goals  Yes  -GC     Patient would benefit from skilled therapy intervention  Yes  -GC        PT Plan    PT Frequency  1x/week;2x/week  -     Predicted Duration of Therapy Intervention (Therapy Eval)  6 weeks  -     Planned CPT's?  PT EVAL LOW COMPLEXITY: 31818;PT THER PROC EA 15 MIN: 29287;PT HOT OR COLD PACK TREAT MCARE;PT ELECTRICAL STIM UNATTEND:   -     PT Plan Comments  Pt is to continue his HEP 2x daily  -       User Key  (r) = Recorded By, (t) = Taken By, (c) = Cosigned By    Initials Name Provider Type     Everton Valles, PT Physical Therapist          Modalities     Row Name 04/22/19 1140             Ice    Ice Applied  Yes  -GC      Location  right ankle and calf with pt in supine  -      Rx Minutes  10 mins  -GC      Ice S/P Rx  Yes  -        User Key  (r) = Recorded By, (t) = Taken By, (c) = Cosigned By    Initials Name Provider Type     Everton Valles, PT Physical Therapist        Exercises     Row Name 04/22/19 1140             Exercise 1    Exercise Name 1  Ankle Pumps  -GC      Cueing 1  Verbal;Demo  -GC      Time 1  25  -GC         Exercise 2    Exercise Name 2  Pro Stretch in sitting  -GC      Cueing 2  Verbal;Demo  -GC      Reps 2  25  -GC         Exercise 3    Exercise Name 3  Rock Board in sitting  -      Cueing 3  Verbal;Demo  -GC      Reps 3  25x CW and CCW  -GC         Exercise 4    Exercise Name 4  Towel Curls with toes  -       Cueing 4  Verbal;Demo  -GC      Time 4  5 min with 2#  -GC         Exercise 5    Exercise Name 5  4-way ankle   -GC      Cueing 5  Verbal;Tactile  -GC      Reps 5  25  -GC      Time 5  silver  -GC        User Key  (r) = Recorded By, (t) = Taken By, (c) = Cosigned By    Initials Name Provider Type    Everton Hooper PT Physical Therapist                        Outcome Measure Options: Lower Extremity Functional Scale (LEFS)  Lower Extremity Functional Index  Any of your usual work, housework or school activities: Moderate difficulty  Your usual hobbies, recreational or sporting activities: Extreme difficulty or unable to perform activity  Getting into or out of the bath: Moderate difficulty  Walking between rooms: A little bit of difficulty  Putting on your shoes or socks: A little bit of difficulty  Squatting: A little bit of difficulty  Lifting an object, like a bag of groceries from the floor: A little bit of difficulty  Performing light activities around your home: A little bit of difficulty  Performing heavy activities around your home: Quite a bit of difficulty  Getting into or out of a car: A little bit of difficulty  Walking 2 blocks: Quite a bit of difficulty  Walking a mile: Extreme difficulty or unable to perform activity  Going up or down 10 stairs (about 1 flight of stairs): Quite a bit of difficulty  Standing for 1 hour: Quite a bit of difficulty  Sitting for 1 hour: A little bit of difficulty  Running on even ground: Extreme difficulty or unable to perform activity  Running on uneven ground: Extreme difficulty or unable to perform activity  Making sharp turns while running fast: Extreme difficulty or unable to perform activity  Hopping: Extreme difficulty or unable to perform activity  Rolling over in bed: No difficulty  Total: 33      Time Calculation:     Start Time: 1140  Stop Time: 1239  Time Calculation (min): 59 min     Therapy Charges for Today     Code Description Service Date Service  Provider Modifiers Qty    26348302884 HC PT EVAL LOW COMPLEXITY 2 4/22/2019 Everton Valles, PT GP 1    63888879939 HC PT THER PROC EA 15 MIN 4/22/2019 Everton Valles, PT GP 1          PT G-Codes  Outcome Measure Options: Lower Extremity Functional Scale (LEFS)  Total: 33         Everton Valles, PT  4/22/2019

## 2019-04-24 ENCOUNTER — HOSPITAL ENCOUNTER (OUTPATIENT)
Dept: PHYSICAL THERAPY | Facility: HOSPITAL | Age: 49
Setting detail: THERAPIES SERIES
Discharge: HOME | End: 2019-04-24

## 2019-04-24 DIAGNOSIS — Z98.890 STATUS POST ACHILLES TENDON REPAIR: Primary | ICD-10-CM

## 2019-04-24 PROCEDURE — 97110 THERAPEUTIC EXERCISES: CPT | Performed by: PHYSICAL THERAPIST

## 2019-04-24 NOTE — THERAPY TREATMENT NOTE
Outpatient Physical Therapy Ortho Treatment Note   Chayito Phillips     Patient Name: Alli Bello  : 1970  MRN: [id number]  Today's Date: 2019      Visit Date: 2019    Visit Dx:    ICD-10-CM ICD-9-CM   1. Status post Achilles tendon repair Z98.890 V45.89       Patient Active Problem List   Diagnosis   • PAF (paroxysmal atrial fibrillation) (CMS/HCC)   • Snores   • Obesity (BMI 30.0-34.9)   • MIKA on CPAP   • Obesity (BMI 30-39.9)   • Achilles rupture, left   • Status post Achilles tendon repair        Past Medical History:   Diagnosis Date   • Achilles tendon injury     LEFT, SCHEDULED FOR REPAIR   • Anxiety attack    • Aortic stenosis    • Atrial fibrillation (CMS/HCC)    • Deep venous thrombosis of calf (CMS/HCC)     right   • THOMAS (dyspnea on exertion)     since fib started   • Fatigue     mild   • Lightheaded    • Malaise and fatigue    • PAC (premature atrial contraction)     very rare   • PAF (paroxysmal atrial fibrillation) (CMS/HCC)    • Palpitations     gets lightheaded with it   • Pulmonary nodule    • Sleep apnea     TO BRING DOS, CPAP   • Snoring     no testing   • SOB (shortness of breath)     mildly   • Syncope     ONLY WITH A-FIB   • Syncope and collapse         Past Surgical History:   Procedure Laterality Date   • ACHILLES TENDON SURGERY Left 3/27/2019    Procedure: ACHILLES TENDON REPAIR;  Surgeon: Angel Baez MD;  Location: [facility];  Service: Orthopedics   • CARDIOVERSION      D/T A FIB   • NO PAST SURGERIES         PT Ortho     Row Name 19 1140       Posture/Observations    Posture/Observations Comments  Pt isnitally seen in tall fracture boot left LE. He has moderate edema left lower leg with mild gastroc atrophy noted. The surgical site is nicely healed with good scar mobility.  -GC       Sensory Screen for Light Touch- Lower Quarter Clearing    L2 (anterior mid thigh)  Left:;Intact  -GC    L3 (distal anterior thigh)  Left:;Intact  -GC    L4 (medial lower  leg/foot)  Left:;Intact  -GC    L5 (lateral lower leg/great toe)  Left:;Intact  -GC    S1 (bottom of foot)  Left:;Intact  -GC       Foot/Ankle Palpation    Achilles' Tendon  Left:;Tender  -GC       Ankle/Foot Special Tests    Sanders test (Achilles’ tendon rupture)  Left:;Negative  -GC    Bakari’s sign (DVT)  Left:;Negative  -GC       Left Lower Ext    Lt Knee Extension/Flexion AROM  WFL  -GC    Lt Ankle Dorsiflexion AROM  2 degrees  -GC    Lt Ankle Plantarflexion AROM  48 degrees  -GC    Lt Ankle Inversion AROM  41 degrees  -GC    Lt Ankle Eversion AROM  14 degrees  -GC       MMT Left Lower Ext    Lt Knee Extension MMT, Gross Movement  (5/5) normal  -GC    Lt Knee Flexion MMT, Gross Movement  (5/5) normal  -GC    Lt Ankle Plantarflexion MMT, Gross Movement  (3/5) fair  -GC    Lt Ankle Dorsiflexion MMT, Gross Movement  (4+/5) good plus  -GC    Lt Ankle Subtalar Inversion MMT, Gross Movement  (4+/5) good plus  -GC    Lt Ankle Subtalar Eversion MMT, Gross Movement  (4+/5) good plus  -GC       Transfers    Comment (Transfers)  Pt is independent with all bed mobility and transfers  -       Gait/Stairs Assessment/Training    Comment (Gait/Stairs)  Pt ambulates in tall, fixed fracture boot left LE without assitive devices. He has significant antalgic gait due to the boot.  -GC      User Key  (r) = Recorded By, (t) = Taken By, (c) = Cosigned By    Initials Name Provider Type     Everton Valles, PT Physical Therapist                      PT Assessment/Plan     Row Name 04/24/19 1200          PT Assessment    Assessment Comments  Pt is doing well with good tolerance to his exercise program.  -GC        PT Plan    PT Plan Comments  Pt is to continue his HEP 2x daily.  -       User Key  (r) = Recorded By, (t) = Taken By, (c) = Cosigned By    Initials Name Provider Type    GC Everton Valles PT Physical Therapist          Modalities     Row Name 04/24/19 1200             Moist Heat    MH Applied  Yes  -      Location   left Achilles with pt in supine  -GC      Rx Minutes  10 mins  -GC      MH Prior to Rx  Yes  -GC         Ice    Ice Applied  Yes  -GC      Location  right ankle and calf with pt in supine  -GC      Rx Minutes  10 mins  -GC      Ice S/P Rx  Yes  -GC        User Key  (r) = Recorded By, (t) = Taken By, (c) = Cosigned By    Initials Name Provider Type     Everton Valles, PT Physical Therapist        Exercises     Row Name 04/24/19 1200             Subjective Comments    Subjective Comments  Pt states his leg is feeling okay, just a little sore.  -GC         Exercise 1    Exercise Name 1  Ankle Pumps  -GC      Cueing 1  Verbal;Demo  -GC      Time 1  25  -GC         Exercise 2    Exercise Name 2  Pro Stretch in sitting  -GC      Cueing 2  Verbal;Demo  -GC      Reps 2  25  -GC         Exercise 3    Exercise Name 3  Rock Board in sitting  -GC      Cueing 3  Verbal;Demo  -GC      Reps 3  25x CW and CCW  -GC         Exercise 4    Exercise Name 4  Towel Curls with toes  -GC      Cueing 4  Verbal;Demo  -GC      Time 4  5 min with 2#  -GC         Exercise 5    Exercise Name 5  4-way ankle   -GC      Cueing 5  Verbal;Tactile  -GC      Reps 5  25  -GC      Time 5  silver  -GC         Exercise 6    Exercise Name 6  Bike  -GC      Cueing 6  Verbal;Tactile  -GC      Time 6  5 min  -GC        User Key  (r) = Recorded By, (t) = Taken By, (c) = Cosigned By    Initials Name Provider Type     Everton Valles, PT Physical Therapist                                          Time Calculation:   Start Time: 1200  Stop Time: 1256  Time Calculation (min): 56 min  Therapy Charges for Today     Code Description Service Date Service Provider Modifiers Qty    64481470712  PT THER PROC EA 15 MIN 4/24/2019 Everton Valles, PT GP 1                    Everton Valles PT  4/24/2019

## 2019-04-29 ENCOUNTER — HOSPITAL ENCOUNTER (OUTPATIENT)
Dept: PHYSICAL THERAPY | Facility: HOSPITAL | Age: 49
Setting detail: THERAPIES SERIES
Discharge: HOME | End: 2019-04-29

## 2019-04-29 DIAGNOSIS — Z98.890 STATUS POST ACHILLES TENDON REPAIR: Primary | ICD-10-CM

## 2019-04-29 PROCEDURE — 97110 THERAPEUTIC EXERCISES: CPT | Performed by: PHYSICAL THERAPIST

## 2019-04-29 NOTE — THERAPY TREATMENT NOTE
Outpatient Physical Therapy Ortho Treatment Note   Chayito Phillips     Patient Name: Alli Bello  : 1970  MRN: [id number]  Today's Date: 2019      Visit Date: 2019    Visit Dx:    ICD-10-CM ICD-9-CM   1. Status post Achilles tendon repair Z98.890 V45.89       Patient Active Problem List   Diagnosis   • PAF (paroxysmal atrial fibrillation) (CMS/HCC)   • Snores   • Obesity (BMI 30.0-34.9)   • MIKA on CPAP   • Obesity (BMI 30-39.9)   • Achilles rupture, left   • Status post Achilles tendon repair        Past Medical History:   Diagnosis Date   • Achilles tendon injury     LEFT, SCHEDULED FOR REPAIR   • Anxiety attack    • Aortic stenosis    • Atrial fibrillation (CMS/HCC)    • Deep venous thrombosis of calf (CMS/HCC)     right   • THOMAS (dyspnea on exertion)     since fib started   • Fatigue     mild   • Lightheaded    • Malaise and fatigue    • PAC (premature atrial contraction)     very rare   • PAF (paroxysmal atrial fibrillation) (CMS/HCC)    • Palpitations     gets lightheaded with it   • Pulmonary nodule    • Sleep apnea     TO BRING DOS, CPAP   • Snoring     no testing   • SOB (shortness of breath)     mildly   • Syncope     ONLY WITH A-FIB   • Syncope and collapse         Past Surgical History:   Procedure Laterality Date   • ACHILLES TENDON SURGERY Left 3/27/2019    Procedure: ACHILLES TENDON REPAIR;  Surgeon: Angel Baez MD;  Location: [facility];  Service: Orthopedics   • CARDIOVERSION      D/T A FIB   • NO PAST SURGERIES         PT Ortho     Row Name 19 5292       Subjective Comments    Subjective Comments  Pt states his Achilles is feeling better.  -GC       Posture/Observations    Posture/Observations Comments  Removed 2 layers of wedging from walking boot.  -GC       Left Lower Ext    Lt Ankle Dorsiflexion AROM  6 degrees  -      User Key  (r) = Recorded By, (t) = Taken By, (c) = Cosigned By    Initials Name Provider Type    Everton Hooper, PT Physical Therapist                       PT Assessment/Plan     Row Name 04/29/19 1330          PT Assessment    Assessment Comments  Pt continues to do well wit increasing ankle ROM and improving function.  -GC        PT Plan    PT Plan Comments  Pt is to continue his HEP daily. Will re-ck later this week.  -GC       User Key  (r) = Recorded By, (t) = Taken By, (c) = Cosigned By    Initials Name Provider Type    GC Everton Valles, PT Physical Therapist          Modalities     Row Name 04/29/19 1330             Ice    Ice Applied  Yes  -GC      Location  right ankle and calf with pt in supine  -GC      Rx Minutes  10 mins  -GC      Ice S/P Rx  Yes  -GC        User Key  (r) = Recorded By, (t) = Taken By, (c) = Cosigned By    Initials Name Provider Type    GC Everton Valles, PT Physical Therapist        Exercises     Row Name 04/29/19 1330             Subjective Comments    Subjective Comments  Pt states his Achilles is feeling better.  -GC         Exercise 1    Exercise Name 1  Ankle Pumps  -GC      Cueing 1  Verbal;Demo  -GC      Time 1  25  -GC         Exercise 2    Exercise Name 2  Pro Stretch in sitting  -GC      Cueing 2  Verbal;Demo  -GC      Reps 2  25  -GC         Exercise 3    Exercise Name 3  Rock Board in sitting  -GC      Cueing 3  Verbal;Demo  -GC      Reps 3  25x CW and CCW  -GC         Exercise 4    Exercise Name 4  Towel Curls with toes  -GC      Cueing 4  Verbal;Demo  -GC      Time 4  5 min with 3#  -GC         Exercise 5    Exercise Name 5  4-way ankle   -GC      Cueing 5  Verbal;Tactile  -GC      Reps 5  25  -GC      Time 5  gold  -GC         Exercise 6    Exercise Name 6  Bike  -GC      Cueing 6  Verbal;Tactile  -GC      Time 6  7 min  -GC        User Key  (r) = Recorded By, (t) = Taken By, (c) = Cosigned By    Initials Name Provider Type    GC Everton Valles, PT Physical Therapist                                          Time Calculation:   Start Time: 1330  Stop Time: 1432  Time Calculation (min): 62 min  Therapy  Charges for Today     Code Description Service Date Service Provider Modifiers Qty    63358188246 HC PT THER PROC EA 15 MIN 4/29/2019 Everton Valles, PT GP 1                    Everton Valles, PT  4/29/2019

## 2019-05-02 ENCOUNTER — HOSPITAL ENCOUNTER (OUTPATIENT)
Dept: PHYSICAL THERAPY | Facility: HOSPITAL | Age: 49
Setting detail: THERAPIES SERIES
Discharge: HOME | End: 2019-05-02

## 2019-05-02 ENCOUNTER — OFFICE VISIT (OUTPATIENT)
Dept: SLEEP MEDICINE | Facility: HOSPITAL | Age: 49
End: 2019-05-02
Attending: INTERNAL MEDICINE

## 2019-05-02 VITALS
DIASTOLIC BLOOD PRESSURE: 75 MMHG | SYSTOLIC BLOOD PRESSURE: 111 MMHG | HEART RATE: 72 BPM | WEIGHT: 250 LBS | HEIGHT: 76 IN | BODY MASS INDEX: 30.44 KG/M2

## 2019-05-02 DIAGNOSIS — E66.9 OBESITY (BMI 30.0-34.9): ICD-10-CM

## 2019-05-02 DIAGNOSIS — G47.33 OSA ON CPAP: Primary | Chronic | ICD-10-CM

## 2019-05-02 DIAGNOSIS — I48.0 PAF (PAROXYSMAL ATRIAL FIBRILLATION) (HCC): Chronic | ICD-10-CM

## 2019-05-02 DIAGNOSIS — Z98.890 STATUS POST ACHILLES TENDON REPAIR: Primary | ICD-10-CM

## 2019-05-02 DIAGNOSIS — Z99.89 OSA ON CPAP: Primary | Chronic | ICD-10-CM

## 2019-05-02 PROCEDURE — 97110 THERAPEUTIC EXERCISES: CPT | Performed by: PHYSICAL THERAPIST

## 2019-05-02 PROCEDURE — 99213 OFFICE O/P EST LOW 20 MIN: CPT | Performed by: FAMILY MEDICINE

## 2019-05-02 PROCEDURE — G0463 HOSPITAL OUTPT CLINIC VISIT: HCPCS

## 2019-05-02 NOTE — PROGRESS NOTES
Follow Up Sleep Disorders Center Note     Chief Complaint:  MIKA     Primary Care Physician: Rashida Ramírez MD    Alli Bello is a 49 y.o.male  was last seen at [facility] sleep lab: May 4, 2018.  Patient had a home sleep study November 2017 and it was positive for moderate obstructive sleep apnea and the patient was started on auto CPAP 8-20.  At last visit he was compliant with the CPAP.  He did not have any significant issues.  Patient reports the same today.    Results Review:  DME is [facility].  Downloads between 4/2/2019-5/1/2019.  Average usage is 5 hours 1 minute with compliance of 73% for at least 4 hours.  Average AHI is 0.7.  Average AutoCPAP pressure is 8-20 cmH2O.    Current Medications:    Current Outpatient Medications:   •  apixaban (ELIQUIS) 2.5 MG tablet tablet, Take 1 tablet by mouth Every 12 (Twelve) Hours., Disp: 42 tablet, Rfl: 0  •  citalopram (CeleXA) 20 MG tablet, 20 mg Every Morning., Disp: , Rfl: 3  •  flecainide (TAMBOCOR) 100 MG tablet, Take 450 mg by mouth Every Other Day As Needed (IF NEEDED TO TAKE 2 DOSES, REPORT TO CARDIOLOGIST, PRN FOR A-FIB)., Disp: , Rfl:   •  LORazepam (ATIVAN) 0.5 MG tablet, Take 0.25 mg by mouth Every 8 (Eight) Hours As Needed for Anxiety., Disp: , Rfl:   •  Multiple Vitamins-Minerals (MULTIVITAMIN WITH MINERALS) tablet tablet, Take 1 tablet by mouth Daily., Disp: , Rfl:   •  nebivolol (BYSTOLIC) 10 MG tablet, Take 10 mg by mouth Daily As Needed (FOR A-FIB FOR 2 DOSES IF STILL IN AFIB, REPORT TO CARDIOLOGIST OFFICE)., Disp: , Rfl:   •  ondansetron (ZOFRAN) 4 MG tablet, Take 1 tablet by mouth Every 8 (Eight) Hours As Needed for Nausea or Vomiting., Disp: 30 tablet, Rfl: 0  •  oxyCODONE-acetaminophen (PERCOCET) 5-325 MG per tablet, Take 1 tablet by mouth Every 4 (Four) Hours As Needed (Pain)., Disp: 42 tablet, Rfl: 0   also entered in Sleep Questionnaire    Patient  has a past medical history of Achilles tendon injury, Anxiety attack, Aortic stenosis, Atrial  "fibrillation (CMS/HCC), Deep venous thrombosis of calf (CMS/HCC), THOMAS (dyspnea on exertion), Fatigue, Lightheaded, Malaise and fatigue, PAC (premature atrial contraction), PAF (paroxysmal atrial fibrillation) (CMS/HCC), Palpitations, Pulmonary nodule, Sleep apnea, Snoring, SOB (shortness of breath), Syncope, and Syncope and collapse.    Social History:    Social History     Socioeconomic History   • Marital status:      Spouse name: Not on file   • Number of children: Not on file   • Years of education: Not on file   • Highest education level: Not on file   Occupational History   • Occupation:    Tobacco Use   • Smoking status: Never Smoker   • Smokeless tobacco: Never Used   • Tobacco comment: caffine use   Substance and Sexual Activity   • Alcohol use: Yes     Alcohol/week: 3.0 - 3.6 oz     Types: 5 - 6 Standard drinks or equivalent per week     Comment: 2-3 nights/week   • Drug use: No     Comment: Reports drinking one caffeinated beverage per day   • Sexual activity: Defer       Allergies:  Patient has no known allergies.    Review of Systems negative except for: All negative; see scanned sleep questionnaire for further details    Vital Signs:    Vitals:    05/02/19 0900   BP: 111/75   Pulse: 72   Weight: 113 kg (250 lb)   Height: 193 cm (76\")     Body mass index is 30.43 kg/m².    Vital Signs /75   Pulse 72   Ht 193 cm (76\")   Wt 113 kg (250 lb)   BMI 30.43 kg/m²  Body mass index is 30.43 kg/m².    General Alert and oriented. No acute distress noted   Pharynx/Throat Class III Mallampati airway, large tongue, no evidence of redundant lateral pharyngeal tissue. No oral lesions. No thrush. Moist mucous membranes.   Head Normocephalic. Symmetrical. Atraumatic.    Nose No septal deviation. No drainage   Chest Wall Normal shape. Symmetric expansion with respiration. No tenderness.   Neck Trachea midline, no thyromegaly or adenopathy    Lungs Clear to auscultation bilaterally. No wheezes. " No rhonchi. No rales. Respirations regular, even and unlabored.   Heart Regular rhythm and normal rate. Normal S1 and S2. No murmur   Abdomen Soft, non-tender and non-distended. Normal bowel sounds. No masses.   Extremities Moves all extremities well. No edema   Psychiatric Normal mood and affect.     Impression:  1. MIKA on CPAP    2. PAF (paroxysmal atrial fibrillation) (CMS/HCC)    3. Obesity (BMI 30.0-34.9)        Obstructive sleep apnea adequately treated with auto CPAP 8-20 cm of water with good compliance and usage and no complaints of hypersomnolence.    Patient uses the CPAP device and benefits from its use in terms of reduction of hypersomnia and snoring.Weight loss will be strongly beneficial to reduce the severity of sleep-disordered breathing.  Caution during activities that require prolonged concentration is strongly advised if sleepiness returns. Changing of PAP supplies regularly is important for effective use. Patient needs to change cushion on the mask or plugs on nasal pillows along with disposable filters once every month and change mask frame, tubing, headgear and Velcro straps every 6 months at the minimum.    Return to clinic in 1 year for follow-up.    Carlton Montemayor MD  Sleep Medicine  05/02/19  9:11 AM

## 2019-05-02 NOTE — THERAPY TREATMENT NOTE
Outpatient Physical Therapy Ortho Treatment Note   Chayito Phillips     Patient Name: Alli Bello  : 1970  MRN: [id number]  Today's Date: 2019      Visit Date: 2019    Visit Dx:    ICD-10-CM ICD-9-CM   1. Status post Achilles tendon repair Z98.890 V45.89       Patient Active Problem List   Diagnosis   • PAF (paroxysmal atrial fibrillation) (CMS/HCC)   • Snores   • Obesity (BMI 30.0-34.9)   • MIKA on CPAP   • Obesity (BMI 30-39.9)   • Achilles rupture, left   • Status post Achilles tendon repair        Past Medical History:   Diagnosis Date   • Achilles tendon injury     LEFT, SCHEDULED FOR REPAIR   • Anxiety attack    • Aortic stenosis    • Atrial fibrillation (CMS/HCC)    • Deep venous thrombosis of calf (CMS/HCC)     right   • THOMAS (dyspnea on exertion)     since fib started   • Fatigue     mild   • Lightheaded    • Malaise and fatigue    • PAC (premature atrial contraction)     very rare   • PAF (paroxysmal atrial fibrillation) (CMS/HCC)    • Palpitations     gets lightheaded with it   • Pulmonary nodule    • Sleep apnea     TO BRING DOS, CPAP   • Snoring     no testing   • SOB (shortness of breath)     mildly   • Syncope     ONLY WITH A-FIB   • Syncope and collapse         Past Surgical History:   Procedure Laterality Date   • ACHILLES TENDON SURGERY Left 3/27/2019    Procedure: ACHILLES TENDON REPAIR;  Surgeon: Angel Baze MD;  Location: [facility];  Service: Orthopedics   • CARDIOVERSION      D/T A FIB   • NO PAST SURGERIES         PT Ortho     Row Name 19 0800       Subjective Comments    Subjective Comments  Pt states his Achilles is feeling pretty good.  -GC       Posture/Observations    Posture/Observations Comments  Removed one additional layer of wedging today.  -GC       Left Lower Ext    Lt Ankle Dorsiflexion AROM  10 degrees  -      User Key  (r) = Recorded By, (t) = Taken By, (c) = Cosigned By    Initials Name Provider Type    Everton Hooper, PT Physical  Therapist                      PT Assessment/Plan     Row Name 05/02/19 0800          PT Assessment    Assessment Comments  Pt continues to show increasing ankle ROM and improving functional mobility.  -GC        PT Plan    PT Plan Comments  Pt is to continue his HEP daily.  -GC       User Key  (r) = Recorded By, (t) = Taken By, (c) = Cosigned By    Initials Name Provider Type     Everton Valles, PT Physical Therapist            Exercises     Row Name 05/02/19 0800             Subjective Comments    Subjective Comments  Pt states his Achilles is feeling pretty good.  -GC         Exercise 1    Exercise Name 1  Ankle Pumps  -GC      Cueing 1  Verbal;Demo  -GC      Time 1  25  -GC         Exercise 2    Exercise Name 2  Pro Stretch in sitting  -GC      Cueing 2  Verbal;Demo  -GC      Reps 2  25  -GC         Exercise 3    Exercise Name 3  Rock Board in sitting  -GC      Cueing 3  Verbal;Demo  -GC      Reps 3  25x CW and CCW  -GC         Exercise 4    Exercise Name 4  Towel Curls with toes  -GC      Cueing 4  Verbal;Demo  -GC      Time 4  5 min with 3#  -GC         Exercise 5    Exercise Name 5  4-way ankle   -GC      Cueing 5  Verbal;Tactile  -GC      Reps 5  25  -GC      Time 5  gold  -GC         Exercise 6    Exercise Name 6  Bike  -GC      Cueing 6  Verbal;Tactile  -GC      Time 6  7 min  -GC        User Key  (r) = Recorded By, (t) = Taken By, (c) = Cosigned By    Initials Name Provider Type     Everton Valles, PT Physical Therapist                                          Time Calculation:   Start Time: 0800  Stop Time: 0837  Time Calculation (min): 37 min  Therapy Charges for Today     Code Description Service Date Service Provider Modifiers Qty    08424767277  PT THER PROC EA 15 MIN 5/2/2019 Everton Valles, PT GP 1                    Everton Valles PT  5/2/2019

## 2019-05-06 ENCOUNTER — HOSPITAL ENCOUNTER (OUTPATIENT)
Dept: PHYSICAL THERAPY | Facility: HOSPITAL | Age: 49
Setting detail: THERAPIES SERIES
Discharge: HOME | End: 2019-05-06

## 2019-05-06 DIAGNOSIS — Z98.890 STATUS POST ACHILLES TENDON REPAIR: Primary | ICD-10-CM

## 2019-05-06 PROCEDURE — 97110 THERAPEUTIC EXERCISES: CPT | Performed by: PHYSICAL THERAPIST

## 2019-05-06 NOTE — THERAPY TREATMENT NOTE
Outpatient Physical Therapy Ortho Treatment Note   Chayito Phillips     Patient Name: Alli Bello  : 1970  MRN: [id number]  Today's Date: 2019      Visit Date: 2019    Visit Dx:    ICD-10-CM ICD-9-CM   1. Status post Achilles tendon repair Z98.890 V45.89       Patient Active Problem List   Diagnosis   • PAF (paroxysmal atrial fibrillation) (CMS/HCC)   • Snores   • Obesity (BMI 30.0-34.9)   • MIKA on CPAP   • Obesity (BMI 30-39.9)   • Achilles rupture, left   • Status post Achilles tendon repair        Past Medical History:   Diagnosis Date   • Achilles tendon injury     LEFT, SCHEDULED FOR REPAIR   • Anxiety attack    • Aortic stenosis    • Atrial fibrillation (CMS/HCC)    • Deep venous thrombosis of calf (CMS/HCC)     right   • THOMAS (dyspnea on exertion)     since fib started   • Fatigue     mild   • Lightheaded    • Malaise and fatigue    • PAC (premature atrial contraction)     very rare   • PAF (paroxysmal atrial fibrillation) (CMS/HCC)    • Palpitations     gets lightheaded with it   • Pulmonary nodule    • Sleep apnea     TO BRING DOS, CPAP   • Snoring     no testing   • SOB (shortness of breath)     mildly   • Syncope     ONLY WITH A-FIB   • Syncope and collapse         Past Surgical History:   Procedure Laterality Date   • ACHILLES TENDON SURGERY Left 3/27/2019    Procedure: ACHILLES TENDON REPAIR;  Surgeon: Angel Baez MD;  Location: [facility];  Service: Orthopedics   • CARDIOVERSION      D/T A FIB   • NO PAST SURGERIES         PT Ortho     Row Name 19 0800       Subjective Comments    Subjective Comments  Pt states he just has alittle pain every once in awhile.  -GC       Posture/Observations    Posture/Observations Comments  Removed the last wedge from walking boot.  -GC       Left Lower Ext    Lt Ankle Dorsiflexion AROM  16 degrees  -GC      User Key  (r) = Recorded By, (t) = Taken By, (c) = Cosigned By    Initials Name Provider Type    Everton Hooper, PT Physical  Therapist                      PT Assessment/Plan     Row Name 05/06/19 0800          PT Assessment    Assessment Comments  Pt is doing well with increased ankle DF ROM and improved gait.  -GC        PT Plan    PT Plan Comments  Pt is to continue his HEP daily.  -GC       User Key  (r) = Recorded By, (t) = Taken By, (c) = Cosigned By    Initials Name Provider Type    GC Everton Valles, PT Physical Therapist          Modalities     Row Name 05/06/19 0800             Ice    Ice Applied  Yes  -GC      Location  right ankle and calf with pt in supine  -GC      Rx Minutes  10 mins  -GC      Ice S/P Rx  Yes  -GC        User Key  (r) = Recorded By, (t) = Taken By, (c) = Cosigned By    Initials Name Provider Type    GC Everton Valles, PT Physical Therapist        Exercises     Row Name 05/06/19 0800             Subjective Comments    Subjective Comments  Pt states he just has alittle pain every once in awhile.  -GC         Exercise 1    Exercise Name 1  Ankle Pumps  -GC      Cueing 1  Verbal;Demo  -GC      Time 1  25  -GC         Exercise 2    Exercise Name 2  Pro Stretch in sitting  -GC      Cueing 2  Verbal;Demo  -GC      Reps 2  25  -GC         Exercise 3    Exercise Name 3  Rock Board in sitting  -GC      Cueing 3  Verbal;Demo  -GC      Reps 3  25x CW and CCW  -GC         Exercise 4    Exercise Name 4  Towel Curls with toes  -GC      Cueing 4  Verbal;Demo  -GC      Time 4  5 min with 4#  -GC         Exercise 5    Exercise Name 5  4-way ankle   -GC      Cueing 5  Verbal;Tactile  -GC      Reps 5  25  -GC      Time 5  gold  -GC         Exercise 6    Exercise Name 6  Bike  -GC      Cueing 6  Verbal;Tactile  -GC      Time 6  7 min  -GC        User Key  (r) = Recorded By, (t) = Taken By, (c) = Cosigned By    Initials Name Provider Type    GC Everton Valles, PT Physical Therapist                                          Time Calculation:   Start Time: 0800  Stop Time: 0842  Time Calculation (min): 42 min  Therapy Charges for  Today     Code Description Service Date Service Provider Modifiers Qty    72201458741 HC PT THER PROC EA 15 MIN 5/6/2019 Everton Valles, PT GP 1                    Everton Valles, PT  5/6/2019

## 2019-05-09 ENCOUNTER — APPOINTMENT (OUTPATIENT)
Dept: PHYSICAL THERAPY | Facility: HOSPITAL | Age: 49
End: 2019-05-09

## 2019-05-13 ENCOUNTER — HOSPITAL ENCOUNTER (OUTPATIENT)
Dept: PHYSICAL THERAPY | Facility: HOSPITAL | Age: 49
Setting detail: THERAPIES SERIES
Discharge: HOME | End: 2019-05-13

## 2019-05-13 DIAGNOSIS — Z98.890 STATUS POST ACHILLES TENDON REPAIR: Primary | ICD-10-CM

## 2019-05-13 PROCEDURE — 97110 THERAPEUTIC EXERCISES: CPT | Performed by: PHYSICAL THERAPIST

## 2019-05-13 NOTE — THERAPY TREATMENT NOTE
Outpatient Physical Therapy Ortho Treatment Note   [facility]     Patient Name: Alli Bello  : 1970  MRN: [id number]  Today's Date: 2019      Visit Date: 2019    Visit Dx:    ICD-10-CM ICD-9-CM   1. Status post Achilles tendon repair Z98.890 V45.89       Patient Active Problem List   Diagnosis   • PAF (paroxysmal atrial fibrillation) (CMS/HCC)   • Snores   • Obesity (BMI 30.0-34.9)   • MIKA on CPAP   • Obesity (BMI 30-39.9)   • Achilles rupture, left   • Status post Achilles tendon repair        Past Medical History:   Diagnosis Date   • Achilles tendon injury     LEFT, SCHEDULED FOR REPAIR   • Anxiety attack    • Aortic stenosis    • Atrial fibrillation (CMS/HCC)    • Deep venous thrombosis of calf (CMS/HCC)     right   • THOMAS (dyspnea on exertion)     since fib started   • Fatigue     mild   • Lightheaded    • Malaise and fatigue    • PAC (premature atrial contraction)     very rare   • PAF (paroxysmal atrial fibrillation) (CMS/HCC)    • Palpitations     gets lightheaded with it   • Pulmonary nodule    • Sleep apnea     TO BRING DOS, CPAP   • Snoring     no testing   • SOB (shortness of breath)     mildly   • Syncope     ONLY WITH A-FIB   • Syncope and collapse         Past Surgical History:   Procedure Laterality Date   • ACHILLES TENDON SURGERY Left 3/27/2019    Procedure: ACHILLES TENDON REPAIR;  Surgeon: Angel Baez MD;  Location: [facility];  Service: Orthopedics   • CARDIOVERSION      D/T A FIB   • NO PAST SURGERIES                         PT Assessment/Plan     Row Name 19 0800          PT Assessment    Assessment Comments  Pt is doing well with good tolerance CKC exercises.  -        PT Plan    PT Plan Comments  Pt is to continue his HEP daily. He has MD follow up again next week.  -       User Key  (r) = Recorded By, (t) = Taken By, (c) = Cosigned By    Initials Name Provider Type    Everton Hooper, PT Physical Therapist            Exercises     Row Name  05/13/19 0800             Subjective Comments    Subjective Comments  Pt states his ankle is feeling pretty good.  -GC         Exercise 2    Exercise Name 2  Pro Stretch in sitting  -GC      Cueing 2  Verbal;Demo  -GC      Reps 2  25  -GC         Exercise 3    Exercise Name 3  BAPS Board in standing  -GC      Cueing 3  Verbal;Demo  -GC      Reps 3  20x CW and CCW level 1, 15x level 2, 10x level 3  -GC         Exercise 4    Exercise Name 4  Towel Curls with toes  -GC      Cueing 4  Verbal;Demo  -GC      Time 4  5 min with 4#  -GC         Exercise 5    Exercise Name 5  4-way ankle   -GC      Cueing 5  Verbal;Tactile  -GC      Reps 5  25  -GC      Time 5  gold  -GC         Exercise 6    Exercise Name 6  Bike  -GC      Cueing 6  Verbal;Tactile  -GC      Time 6  7 min  -GC        User Key  (r) = Recorded By, (t) = Taken By, (c) = Cosigned By    Initials Name Provider Type     Everton Valles, PT Physical Therapist                                          Time Calculation:   Start Time: 0800  Stop Time: 0836  Time Calculation (min): 36 min  Therapy Charges for Today     Code Description Service Date Service Provider Modifiers Qty    [id number]  PT THER PROC EA 15 MIN 5/13/2019 Everton Valles, PT GP 1                    Everton Valles PT  5/13/2019

## 2019-05-21 ENCOUNTER — OFFICE VISIT (OUTPATIENT)
Dept: ORTHOPEDIC SURGERY | Facility: CLINIC | Age: 49
End: 2019-05-21

## 2019-05-21 DIAGNOSIS — Z98.890 STATUS POST ACHILLES TENDON REPAIR: Primary | ICD-10-CM

## 2019-05-21 PROCEDURE — 99024 POSTOP FOLLOW-UP VISIT: CPT | Performed by: ORTHOPAEDIC SURGERY

## 2019-05-21 NOTE — PROGRESS NOTES
CC: F/u s/p left Achilles repair, DOS 3/27/2019     Interval history: Patient returns to clinic stating that left ankle and foot have continued to do well, tolerating boot without difficulties. Ambulating with the boot, without any problems. He had the heel wedges removed from his boot several weeks ago. Denies numbness or tingling to the lower extremity, no fevers chills or sweats. He would like to discontinue PT and start an at-home exercise program.        Exam:              Left ankle- incision well healed              No surrounding erythema or fluctuance              Ankle DF 20 degrees, PF 45 degrees, 4 out of 5 strength on dorsiflexion and plantarflexion              No significant calf pain or swelling              Flex and extend toes without difficulty              Positive sensation light touch all distributions left foot              Achilles tendon palpated to be in continuity              No evidence of heel decubitus or pressure ulcer        Impression: s/p left Achilles repair     Plan:  1. May gradually return to weightbearing exercise as tolerated.  2. Will wean from physical therapy to an at-home exercise and strengthening program.  3. Follow-up in office in 2 months for reevaluation. All questions answered today

## 2019-07-23 ENCOUNTER — OFFICE VISIT (OUTPATIENT)
Dept: ORTHOPEDIC SURGERY | Facility: CLINIC | Age: 49
End: 2019-07-23

## 2019-07-23 VITALS — WEIGHT: 248 LBS | BODY MASS INDEX: 30.2 KG/M2 | HEIGHT: 76 IN

## 2019-07-23 DIAGNOSIS — Z98.890 STATUS POST ACHILLES TENDON REPAIR: Primary | ICD-10-CM

## 2019-07-23 PROCEDURE — 99212 OFFICE O/P EST SF 10 MIN: CPT | Performed by: ORTHOPAEDIC SURGERY

## 2019-07-23 NOTE — PROGRESS NOTES
Subjective:     Patient ID: Alli Bello is a 49 y.o. male.    Chief Complaint:    History of Present Illness       Social History     Occupational History   • Occupation:    Tobacco Use   • Smoking status: Never Smoker   • Smokeless tobacco: Never Used   • Tobacco comment: caffine use   Substance and Sexual Activity   • Alcohol use: Yes     Alcohol/week: 3.0 - 3.6 oz     Types: 5 - 6 Standard drinks or equivalent per week     Comment: 2-3 nights/week   • Drug use: No     Comment: Reports drinking one caffeinated beverage per day   • Sexual activity: Defer      Review of Systems      Past Medical History:   Diagnosis Date   • Achilles tendon injury     LEFT, SCHEDULED FOR REPAIR   • Anxiety attack    • Aortic stenosis    • Atrial fibrillation (CMS/HCC)    • Deep venous thrombosis of calf (CMS/HCC)     right   • THOMAS (dyspnea on exertion)     since fib started   • Fatigue     mild   • Lightheaded    • Malaise and fatigue    • PAC (premature atrial contraction)     very rare   • PAF (paroxysmal atrial fibrillation) (CMS/HCC)    • Palpitations     gets lightheaded with it   • Pulmonary nodule    • Sleep apnea     TO BRING DOS, CPAP   • Snoring     no testing   • SOB (shortness of breath)     mildly   • Syncope     ONLY WITH A-FIB   • Syncope and collapse      Past Surgical History:   Procedure Laterality Date   • ACHILLES TENDON SURGERY Left 3/27/2019    Procedure: ACHILLES TENDON REPAIR;  Surgeon: Angel Baez MD;  Location: [facility];  Service: Orthopedics   • CARDIOVERSION  1998    D/T A FIB   • NO PAST SURGERIES       Family History   Problem Relation Age of Onset   • Heart disease Father    • Hypertension Father    • Coronary artery disease Father         CAD/CHD <59yo   • Hyperlipidemia Father    • Heart attack Father    • Stroke Paternal Grandmother    • Arrhythmia Mother    • Atrial fibrillation Mother    • Arrhythmia Brother    • Atrial fibrillation Brother          Objective:  There were  no vitals filed for this visit.      07/23/19  1542   Weight: 112 kg (248 lb)     Body mass index is 30.19 kg/m².        Ortho Exam       Assessment:      No diagnosis found.       Plan:            Work Status:    FRANCIS query complete.    Orders:  No orders of the defined types were placed in this encounter.      Medications:  No orders of the defined types were placed in this encounter.      Followup:  No Follow-up on file.          Dictated utilizing Dragon dictation

## 2019-07-23 NOTE — PROGRESS NOTES
Subjective:     Patient ID: Alli Bello is a 49 y.o. male.    Chief Complaint:  F/u s/p left Achilles repair, DOS 3/27/2019  History of Present Illness  Alli Bello returns to clinic today for evaluation of left ankle and Achilles, doing well at this point time, is noting some mild residual swelling that does wax and wane.  Mild intermittent pain rates as a 1-2 out of 10 localized primarily to the calf with increased levels of activity.  He has been able to progress to a fast walk over a moderate distance, has even tried a light jog but is noticed increased issues with doing so.  Denies associated numbness or tingling and denies any radiating pain into his foot or heel.     Social History     Occupational History   • Occupation:    Tobacco Use   • Smoking status: Never Smoker   • Smokeless tobacco: Never Used   • Tobacco comment: caffine use   Substance and Sexual Activity   • Alcohol use: Yes     Alcohol/week: 3.0 - 3.6 oz     Types: 5 - 6 Standard drinks or equivalent per week     Comment: 2-3 nights/week   • Drug use: No     Comment: Reports drinking one caffeinated beverage per day   • Sexual activity: Defer      Past Medical History:   Diagnosis Date   • Achilles tendon injury     LEFT, SCHEDULED FOR REPAIR   • Anxiety attack    • Aortic stenosis    • Atrial fibrillation (CMS/HCC)    • Deep venous thrombosis of calf (CMS/HCC)     right   • THOMAS (dyspnea on exertion)     since fib started   • Fatigue     mild   • Lightheaded    • Malaise and fatigue    • PAC (premature atrial contraction)     very rare   • PAF (paroxysmal atrial fibrillation) (CMS/HCC)    • Palpitations     gets lightheaded with it   • Pulmonary nodule    • Sleep apnea     TO BRING DOS, CPAP   • Snoring     no testing   • SOB (shortness of breath)     mildly   • Syncope     ONLY WITH A-FIB   • Syncope and collapse      Past Surgical History:   Procedure Laterality Date   • ACHILLES TENDON SURGERY Left 3/27/2019    Procedure:  "ACHILLES TENDON REPAIR;  Surgeon: Angel Baez MD;  Location: [facility];  Service: Orthopedics   • CARDIOVERSION  1998    D/T A FIB   • NO PAST SURGERIES         Family History   Problem Relation Age of Onset   • Heart disease Father    • Hypertension Father    • Coronary artery disease Father         CAD/CHD <59yo   • Hyperlipidemia Father    • Heart attack Father    • Stroke Paternal Grandmother    • Arrhythmia Mother    • Atrial fibrillation Mother    • Arrhythmia Brother    • Atrial fibrillation Brother          Review of Systems   Constitutional: Negative for chills, diaphoresis, fever and unexpected weight change.   HENT: Negative for hearing loss, nosebleeds, sore throat and tinnitus.    Eyes: Negative for pain and visual disturbance.   Respiratory: Negative for cough, shortness of breath and wheezing.    Cardiovascular: Negative for chest pain and palpitations.   Gastrointestinal: Negative for abdominal pain, diarrhea, nausea and vomiting.   Endocrine: Negative for cold intolerance, heat intolerance and polydipsia.   Genitourinary: Negative for difficulty urinating, dysuria and hematuria.   Musculoskeletal: Positive for myalgias. Negative for arthralgias and joint swelling.   Skin: Negative for rash and wound.   Allergic/Immunologic: Negative for environmental allergies.   Neurological: Negative for dizziness, syncope and numbness.   Hematological: Does not bruise/bleed easily.   Psychiatric/Behavioral: Negative for dysphoric mood and sleep disturbance. The patient is not nervous/anxious.            Objective:  Vitals:    07/23/19 1542   Weight: 112 kg (248 lb)   Height: 193 cm (76\")         07/23/19  1542   Weight: 112 kg (248 lb)     Body mass index is 30.19 kg/m².  General: No acute distress.  Resp: normal respiratory effort  Skin: no rashes or wounds; normal turgor  Psych: mood and affect appropriate; recent and remote memory intact          Ortho Exam     Left ankle-active dorsiflexion 25 " degrees, plantar flexion 50 degrees, 4+ out of 5 strength, negative Sanders test, Achilles tendon palpated to be in continuity, incision well-healed, mild surrounding soft tissue swelling, no calf pain on direct pressure and negative Homans sign.  Brisk cap refill all digits, 2+ dorsalis pedis pulse left foot.    Imaging:    Assessment:        1. Status post Achilles tendon repair           Plan:          1. Discussed treatment options at length with patient at today's visit.  Patient is progressing well at this point time, recommended more aggressive work on stretching and strengthening particular with heel raises over stairs and use of therapy bands.  I told him to try to stick with flat level ground for at least the next 6 to 8 weeks and then he can try to progress to uneven surfaces.  I did tell him that it would take at least 8 to 12 months to regain his previous levels of strength and endurance.      Alli SHRUTHI Bello was in agreement with plan and had all questions answered.     Orders:  No orders of the defined types were placed in this encounter.      Medications:  No orders of the defined types were placed in this encounter.      Followup:  Return if symptoms worsen or fail to improve.    Alli was seen today for follow-up.    Diagnoses and all orders for this visit:    Status post Achilles tendon repair          Dictated utilizing Dragon dictation

## 2020-05-22 ENCOUNTER — HOSPITAL ENCOUNTER (EMERGENCY)
Facility: HOSPITAL | Age: 50
Discharge: HOME | End: 2020-05-22
Attending: EMERGENCY MEDICINE | Admitting: EMERGENCY MEDICINE

## 2020-05-22 VITALS
BODY MASS INDEX: 29.83 KG/M2 | HEIGHT: 76 IN | WEIGHT: 245 LBS | RESPIRATION RATE: 16 BRPM | DIASTOLIC BLOOD PRESSURE: 88 MMHG | HEART RATE: 68 BPM | OXYGEN SATURATION: 99 % | SYSTOLIC BLOOD PRESSURE: 141 MMHG | TEMPERATURE: 96.9 F

## 2020-05-22 DIAGNOSIS — M62.838 TRAPEZIUS MUSCLE SPASM: Primary | ICD-10-CM

## 2020-05-22 LAB
ALBUMIN SERPL-MCNC: 4.7 G/DL (ref 3.5–5.2)
ALBUMIN/GLOB SERPL: 1.7 G/DL
ALP SERPL-CCNC: 50 U/L (ref 39–117)
ALT SERPL W P-5'-P-CCNC: 19 U/L (ref 1–41)
ANION GAP SERPL CALCULATED.3IONS-SCNC: 15 MMOL/L (ref 5–15)
AST SERPL-CCNC: 17 U/L (ref 1–40)
BASOPHILS # BLD AUTO: 0.01 10*3/MM3 (ref 0–0.2)
BASOPHILS NFR BLD AUTO: 0.1 % (ref 0–1.5)
BILIRUB SERPL-MCNC: 0.5 MG/DL (ref 0.2–1.2)
BUN BLD-MCNC: 18 MG/DL (ref 6–20)
BUN/CREAT SERPL: 19.1 (ref 7–25)
CALCIUM SPEC-SCNC: 9.3 MG/DL (ref 8.6–10.5)
CHLORIDE SERPL-SCNC: 100 MMOL/L (ref 98–107)
CO2 SERPL-SCNC: 22 MMOL/L (ref 22–29)
CREAT BLD-MCNC: 0.94 MG/DL (ref 0.76–1.27)
DEPRECATED RDW RBC AUTO: 38.6 FL (ref 37–54)
EOSINOPHIL # BLD AUTO: 0 10*3/MM3 (ref 0–0.4)
EOSINOPHIL NFR BLD AUTO: 0 % (ref 0.3–6.2)
ERYTHROCYTE [DISTWIDTH] IN BLOOD BY AUTOMATED COUNT: 12.5 % (ref 12.3–15.4)
GFR SERPL CREATININE-BSD FRML MDRD: 85 ML/MIN/1.73
GLOBULIN UR ELPH-MCNC: 2.7 GM/DL
GLUCOSE BLD-MCNC: 122 MG/DL (ref 65–99)
HCT VFR BLD AUTO: 45.8 % (ref 37.5–51)
HGB BLD-MCNC: 15.9 G/DL (ref 13–17.7)
HOLD SPECIMEN: NORMAL
IMM GRANULOCYTES # BLD AUTO: 0.03 10*3/MM3 (ref 0–0.05)
IMM GRANULOCYTES NFR BLD AUTO: 0.3 % (ref 0–0.5)
LYMPHOCYTES # BLD AUTO: 1.88 10*3/MM3 (ref 0.7–3.1)
LYMPHOCYTES NFR BLD AUTO: 20.6 % (ref 19.6–45.3)
MCH RBC QN AUTO: 29.9 PG (ref 26.6–33)
MCHC RBC AUTO-ENTMCNC: 34.7 G/DL (ref 31.5–35.7)
MCV RBC AUTO: 86.3 FL (ref 79–97)
MONOCYTES # BLD AUTO: 0.35 10*3/MM3 (ref 0.1–0.9)
MONOCYTES NFR BLD AUTO: 3.8 % (ref 5–12)
NEUTROPHILS # BLD AUTO: 6.85 10*3/MM3 (ref 1.7–7)
NEUTROPHILS NFR BLD AUTO: 75.2 % (ref 42.7–76)
NRBC BLD AUTO-RTO: 0 /100 WBC (ref 0–0.2)
PLATELET # BLD AUTO: 148 10*3/MM3 (ref 140–450)
PMV BLD AUTO: 11.5 FL (ref 6–12)
POTASSIUM BLD-SCNC: 4.2 MMOL/L (ref 3.5–5.2)
PROT SERPL-MCNC: 7.4 G/DL (ref 6–8.5)
RBC # BLD AUTO: 5.31 10*6/MM3 (ref 4.14–5.8)
SODIUM BLD-SCNC: 137 MMOL/L (ref 136–145)
TROPONIN T SERPL-MCNC: <0.01 NG/ML (ref 0–0.03)
WBC NRBC COR # BLD: 9.12 10*3/MM3 (ref 3.4–10.8)
WHOLE BLOOD HOLD SPECIMEN: NORMAL
WHOLE BLOOD HOLD SPECIMEN: NORMAL

## 2020-05-22 PROCEDURE — 84484 ASSAY OF TROPONIN QUANT: CPT | Performed by: EMERGENCY MEDICINE

## 2020-05-22 PROCEDURE — 80053 COMPREHEN METABOLIC PANEL: CPT | Performed by: EMERGENCY MEDICINE

## 2020-05-22 PROCEDURE — 93005 ELECTROCARDIOGRAM TRACING: CPT | Performed by: EMERGENCY MEDICINE

## 2020-05-22 PROCEDURE — 85025 COMPLETE CBC W/AUTO DIFF WBC: CPT | Performed by: EMERGENCY MEDICINE

## 2020-05-22 PROCEDURE — 99283 EMERGENCY DEPT VISIT LOW MDM: CPT

## 2020-05-22 PROCEDURE — 93010 ELECTROCARDIOGRAM REPORT: CPT | Performed by: INTERNAL MEDICINE

## 2020-05-22 PROCEDURE — 93005 ELECTROCARDIOGRAM TRACING: CPT

## 2020-05-22 RX ORDER — HYDROCODONE BITARTRATE AND ACETAMINOPHEN 5; 325 MG/1; MG/1
1 TABLET ORAL EVERY 6 HOURS PRN
Qty: 12 TABLET | Refills: 0 | Status: SHIPPED | OUTPATIENT
Start: 2020-05-22 | End: 2020-05-25

## 2020-05-22 RX ORDER — SODIUM CHLORIDE 0.9 % (FLUSH) 0.9 %
10 SYRINGE (ML) INJECTION AS NEEDED
Status: DISCONTINUED | OUTPATIENT
Start: 2020-05-22 | End: 2020-05-22

## 2020-05-22 NOTE — ED TRIAGE NOTES
Left  Posterior shoulder pain into shoulder blade, went to PCP Wednesday given prednisone and muscle relaxer with no relief. Pt states pain goes into left arm, states feel like pain going into left chest. Denies SOA. Pt arrives in triage with mask on. Triage staff wearing masks.

## 2020-05-22 NOTE — ED PROVIDER NOTES
MD ATTESTATION NOTE    The MALACHI and I have discussed this patient's history, physical exam, and treatment plan.  I have reviewed the documentation and personally had a face to face interaction with the patient. I affirm the documentation and agree with the treatment and plan.  The attached note describes my personal findings.      Alli Bello is a 50 y.o. male who presents to the ED c/o left shoulder pain.  Onset 3 weeks ago.  Pain is progressively gotten worse and is in the left shoulder.  It actually seems to start medial to the left scapula.  He has had no fever.  No trauma.  No chest pain or shortness of breath.      On exam:  Tenderness to the musculature that is medial to the left scapula  There is full range of motion to the left shoulder  2+ radial pulses bilaterally  No overlying redness or warmth to the shoulder    Labs  Recent Results (from the past 24 hour(s))   Comprehensive Metabolic Panel    Collection Time: 05/22/20  4:12 PM   Result Value Ref Range    Glucose 122 (H) 65 - 99 mg/dL    BUN 18 6 - 20 mg/dL    Creatinine 0.94 0.76 - 1.27 mg/dL    Sodium 137 136 - 145 mmol/L    Potassium 4.2 3.5 - 5.2 mmol/L    Chloride 100 98 - 107 mmol/L    CO2 22.0 22.0 - 29.0 mmol/L    Calcium 9.3 8.6 - 10.5 mg/dL    Total Protein 7.4 6.0 - 8.5 g/dL    Albumin 4.70 3.50 - 5.20 g/dL    ALT (SGPT) 19 1 - 41 U/L    AST (SGOT) 17 1 - 40 U/L    Alkaline Phosphatase 50 39 - 117 U/L    Total Bilirubin 0.5 0.2 - 1.2 mg/dL    eGFR Non African Amer 85 >60 mL/min/1.73    Globulin 2.7 gm/dL    A/G Ratio 1.7 g/dL    BUN/Creatinine Ratio 19.1 7.0 - 25.0    Anion Gap 15.0 5.0 - 15.0 mmol/L   Troponin    Collection Time: 05/22/20  4:12 PM   Result Value Ref Range    Troponin T <0.010 0.000 - 0.030 ng/mL   Light Blue Top    Collection Time: 05/22/20  4:12 PM   Result Value Ref Range    Extra Tube hold for add-on    Green Top (Gel)    Collection Time: 05/22/20  4:12 PM   Result Value Ref Range    Extra Tube Hold for add-ons.     Lavender Top    Collection Time: 05/22/20  4:13 PM   Result Value Ref Range    Extra Tube hold for add-on    CBC Auto Differential    Collection Time: 05/22/20  4:13 PM   Result Value Ref Range    WBC 9.12 3.40 - 10.80 10*3/mm3    RBC 5.31 4.14 - 5.80 10*6/mm3    Hemoglobin 15.9 13.0 - 17.7 g/dL    Hematocrit 45.8 37.5 - 51.0 %    MCV 86.3 79.0 - 97.0 fL    MCH 29.9 26.6 - 33.0 pg    MCHC 34.7 31.5 - 35.7 g/dL    RDW 12.5 12.3 - 15.4 %    RDW-SD 38.6 37.0 - 54.0 fl    MPV 11.5 6.0 - 12.0 fL    Platelets 148 140 - 450 10*3/mm3    Neutrophil % 75.2 42.7 - 76.0 %    Lymphocyte % 20.6 19.6 - 45.3 %    Monocyte % 3.8 (L) 5.0 - 12.0 %    Eosinophil % 0.0 (L) 0.3 - 6.2 %    Basophil % 0.1 0.0 - 1.5 %    Immature Grans % 0.3 0.0 - 0.5 %    Neutrophils, Absolute 6.85 1.70 - 7.00 10*3/mm3    Lymphocytes, Absolute 1.88 0.70 - 3.10 10*3/mm3    Monocytes, Absolute 0.35 0.10 - 0.90 10*3/mm3    Eosinophils, Absolute 0.00 0.00 - 0.40 10*3/mm3    Basophils, Absolute 0.01 0.00 - 0.20 10*3/mm3    Immature Grans, Absolute 0.03 0.00 - 0.05 10*3/mm3    nRBC 0.0 0.0 - 0.2 /100 WBC       Radiology  No Radiology Exams Resulted Within Past 24 Hours    Medical Decision Making:  ED Course as of May 24 0730   Fri May 22, 2020   1700 EKG performed at 1557 shows a sinus rhythm with a rate of 56, supraventricular bigeminy, normal NV, narrow QRS, normal QTC no ST elevation     [KA]   1753 Troponin T: <0.010 [TD]   1753 WBC: 9.12 [TD]   1809 EKG interpreted by myself.  Time 1557.  Sinus rhythm.  Heart rate 56.  Normal axis.  Supraventricular bigeminy.    [TD]   1830 Medical chart reviewed. Kevin report reviewed.  Request #[id number].  Patient prescribed 1 mg lorazepam on 5/30/2019 and 3/31/2019 and quantities of 20.  No other controlled substances in the last 1 year.    [KA]   2005 Troponin T: <0.010 [KA]   2005 WBC: 9.12 [KA]   2005 RBC: 5.31 [KA]   2005 Hemoglobin: 15.9 [KA]   2005 Glucose(!): 122 [KA]   2005 BUN: 18 [KA]   2005 Creatinine: 0.94  [KA]   2005 Sodium: 137 [KA]      ED Course User Index  [KA] Jeanne Moreno PA  [TD] Fermin Riggs II, MD       I offer the patient a chest x-ray and shoulder x-ray.  Declines as he does not feel to be a very useful.  This is a reasonable approach given the chronicity of his symptoms without much likelihood of fracture.  Only I would be potentially concerned about would be an occult malignancy.  However this is nonemergent diagnosis and I recommended he follow-up with his PCP again to have x-rays taken.  He states that his PCP has ordered him an MRI.  Patient is not a smoker and he is of relatively low risk for malignancy in the superior lungs that would cause this.  Nonetheless, I did recommend this to the patient.    Diagnosis  Final diagnoses:   Trapezius muscle spasm        Fermin Riggs II, MD  05/22/20 1811       Fermin Riggs II, MD  05/24/20 [id number]

## 2020-05-22 NOTE — DISCHARGE INSTRUCTIONS
Warm moist compresses  to the area for 15 minutes TID.   Follow-up with your PCP.  Continue the medications that have prescribed.  Need physical therapy, discussed this with your PCP.  Turn to the emergency department as needed.    Narcotic pain medications can make you loopy, sleepy, constipated.  Take a stool softener of your choice and drink plenty of fluids.  Do not operate heavy machinery, drive, or make important decisions will taking this medication.  There is a risk of addiction.  Take it only as prescribed.

## 2020-05-22 NOTE — ED PROVIDER NOTES
" EMERGENCY DEPARTMENT ENCOUNTER    Room Number:  36/36  Date seen:  5/22/2020  Time seen: 4:35 PM  PCP: Alexa Vega MD  Historian: patient      HPI:  Chief Complaint: left upper back pain    A complete HPI/ROS/PMH/PSH/SH/FH are unobtainable due to: none    Context: Alli Bello is a 50 y.o. male who presents to the ED for evaluation of 1 month hx of sudden onset left upper back pain that radiates to neck and left underarm. Pain worsens with ROM of the neck, LUE and the left shoulder, improves with lying flat on his back. Pain is essentially constant, feels like stabbing and like \"funny bone\" pain. He saw 3 days ago and was placed on steroids and flexeril without improvement. He denies any chest pain, soa, cough, fever, chills, anosmia, dysgeusia, known exposure to Covid 19. No numbness or weakness in the LUE.         PAST MEDICAL HISTORY  Active Ambulatory Problems     Diagnosis Date Noted   • PAF (paroxysmal atrial fibrillation) (CMS/HCC) 09/11/2017   • Snores 10/16/2017   • Obesity (BMI 30.0-34.9) 10/16/2017   • MIKA on CPAP 05/04/2018   • Obesity (BMI 30-39.9) 05/04/2018   • Achilles rupture, left 03/25/2019   • Status post Achilles tendon repair 04/05/2019     Resolved Ambulatory Problems     Diagnosis Date Noted   • No Resolved Ambulatory Problems     Past Medical History:   Diagnosis Date   • Achilles tendon injury    • Anxiety attack    • Aortic stenosis    • Atrial fibrillation (CMS/HCC)    • Deep venous thrombosis of calf (CMS/HCC)    • THOMAS (dyspnea on exertion)    • Fatigue    • Lightheaded    • Malaise and fatigue    • PAC (premature atrial contraction)    • Palpitations    • Pulmonary nodule    • Sleep apnea    • Snoring    • SOB (shortness of breath)    • Syncope    • Syncope and collapse          PAST SURGICAL HISTORY  Past Surgical History:   Procedure Laterality Date   • ACHILLES TENDON SURGERY Left 3/27/2019    Procedure: ACHILLES TENDON REPAIR;  Surgeon: Angel Baez MD;  Location: " [facility] OR;  Service: Orthopedics   • CARDIOVERSION  1998    D/T A FIB   • NO PAST SURGERIES           FAMILY HISTORY  Family History   Problem Relation Age of Onset   • Heart disease Father    • Hypertension Father    • Coronary artery disease Father         CAD/CHD <59yo   • Hyperlipidemia Father    • Heart attack Father    • Stroke Paternal Grandmother    • Arrhythmia Mother    • Atrial fibrillation Mother    • Arrhythmia Brother    • Atrial fibrillation Brother          SOCIAL HISTORY  Social History     Socioeconomic History   • Marital status:      Spouse name: Not on file   • Number of children: Not on file   • Years of education: Not on file   • Highest education level: Not on file   Occupational History   • Occupation:    Tobacco Use   • Smoking status: Never Smoker   • Smokeless tobacco: Never Used   • Tobacco comment: caffine use   Substance and Sexual Activity   • Alcohol use: Yes     Alcohol/week: 5.0 - 6.0 standard drinks     Types: 5 - 6 Standard drinks or equivalent per week     Comment: 2-3 nights/week   • Drug use: No     Comment: Reports drinking one caffeinated beverage per day   • Sexual activity: Defer         ALLERGIES  Patient has no known allergies.        REVIEW OF SYSTEMS  Review of Systems     All systems reviewed and negative except for those discussed in HPI.       PHYSICAL EXAM  ED Triage Vitals   Temp Heart Rate Resp BP SpO2   05/22/20 1427 05/22/20 1427 05/22/20 1427 05/22/20 1438 05/22/20 1427   96.9 °F (36.1 °C) 86 18 137/89 95 %      Temp src Heart Rate Source Patient Position BP Location FiO2 (%)   05/22/20 1427 -- 05/22/20 1438 05/22/20 1438 --   Tympanic  Lying Left arm          GENERAL: not distressed  HENT: atraumatic, normocephalic  EYES: no scleral icterus  CV: regular rhythm, regular rate  RESPIRATORY: normal effort  ABDOMEN: soft, nontender  MUSCULOSKELETAL: There is no midline, C, T, L-spine tenderness.  There is tenderness to the left trapezius and  along the left paracervical musculature.  She has increased pain with rightward rotation of the head and internal rotation of the left shoulder, pain is improved with external rotation and forward elevation of the arm. There is no erythema, edema or rash. Sensation is intact to light touch in radial, ulnar, and median nerve distributions. There is strong finger abduction, thumb and pinky adduction, and wrist extension. Radial and ulnar pulses are 2+ and equal bilaterally.    NEURO: alert, moves all extremities, follows commands  SKIN: warm, dry    Vital signs and nursing notes reviewed.          LAB RESULTS  Recent Results (from the past 24 hour(s))   Comprehensive Metabolic Panel    Collection Time: 05/22/20  4:12 PM   Result Value Ref Range    Glucose 122 (H) 65 - 99 mg/dL    BUN 18 6 - 20 mg/dL    Creatinine 0.94 0.76 - 1.27 mg/dL    Sodium 137 136 - 145 mmol/L    Potassium 4.2 3.5 - 5.2 mmol/L    Chloride 100 98 - 107 mmol/L    CO2 22.0 22.0 - 29.0 mmol/L    Calcium 9.3 8.6 - 10.5 mg/dL    Total Protein 7.4 6.0 - 8.5 g/dL    Albumin 4.70 3.50 - 5.20 g/dL    ALT (SGPT) 19 1 - 41 U/L    AST (SGOT) 17 1 - 40 U/L    Alkaline Phosphatase 50 39 - 117 U/L    Total Bilirubin 0.5 0.2 - 1.2 mg/dL    eGFR Non African Amer 85 >60 mL/min/1.73    Globulin 2.7 gm/dL    A/G Ratio 1.7 g/dL    BUN/Creatinine Ratio 19.1 7.0 - 25.0    Anion Gap 15.0 5.0 - 15.0 mmol/L   Troponin    Collection Time: 05/22/20  4:12 PM   Result Value Ref Range    Troponin T <0.010 0.000 - 0.030 ng/mL   Light Blue Top    Collection Time: 05/22/20  4:12 PM   Result Value Ref Range    Extra Tube hold for add-on    Green Top (Gel)    Collection Time: 05/22/20  4:12 PM   Result Value Ref Range    Extra Tube Hold for add-ons.    Lavender Top    Collection Time: 05/22/20  4:13 PM   Result Value Ref Range    Extra Tube hold for add-on    CBC Auto Differential    Collection Time: 05/22/20  4:13 PM   Result Value Ref Range    WBC 9.12 3.40 - 10.80 10*3/mm3     RBC 5.31 4.14 - 5.80 10*6/mm3    Hemoglobin 15.9 13.0 - 17.7 g/dL    Hematocrit 45.8 37.5 - 51.0 %    MCV 86.3 79.0 - 97.0 fL    MCH 29.9 26.6 - 33.0 pg    MCHC 34.7 31.5 - 35.7 g/dL    RDW 12.5 12.3 - 15.4 %    RDW-SD 38.6 37.0 - 54.0 fl    MPV 11.5 6.0 - 12.0 fL    Platelets 148 140 - 450 10*3/mm3    Neutrophil % 75.2 42.7 - 76.0 %    Lymphocyte % 20.6 19.6 - 45.3 %    Monocyte % 3.8 (L) 5.0 - 12.0 %    Eosinophil % 0.0 (L) 0.3 - 6.2 %    Basophil % 0.1 0.0 - 1.5 %    Immature Grans % 0.3 0.0 - 0.5 %    Neutrophils, Absolute 6.85 1.70 - 7.00 10*3/mm3    Lymphocytes, Absolute 1.88 0.70 - 3.10 10*3/mm3    Monocytes, Absolute 0.35 0.10 - 0.90 10*3/mm3    Eosinophils, Absolute 0.00 0.00 - 0.40 10*3/mm3    Basophils, Absolute 0.01 0.00 - 0.20 10*3/mm3    Immature Grans, Absolute 0.03 0.00 - 0.05 10*3/mm3    nRBC 0.0 0.0 - 0.2 /100 WBC       Ordered the above labs and independently reviewed the results.        RADIOLOGY  No orders to display       I ordered the above noted radiological studies. Reviewed by me and discussed with radiologist.  See dictation for official radiology interpretation.    PROCEDURES  Procedures        MEDICATIONS GIVEN IN ER  Medications - No data to display          PROGRESS AND CONSULTS    DDX includes but not limited to muscle spasm, muscle strain, radiculopathy, shingles,    ED Course as of May 22 2043   Fri May 22, 2020   1700 EKG performed at 1557 shows a sinus rhythm with a rate of 56, supraventricular bigeminy, normal [place], narrow QRS, normal QTC no ST elevation     [KA]   1753 Troponin T: <0.010 [TD]   1753 WBC: 9.12 [TD]   1809 EKG interpreted by myself.  Time 1557.  Sinus rhythm.  Heart rate 56.  Normal axis.  Supraventricular bigeminy.    [TD]   1830 Medical chart reviewed. Kevin report reviewed.  Request #[id number].  Patient prescribed 1 mg lorazepam on 5/30/2019 and 3/31/2019 and quantities of 20.  No other controlled substances in the last 1 year.    [KA]   2005 Troponin T: <0.010  [KA]   2005 WBC: 9.12 [KA]   2005 RBC: 5.31 [KA]   2005 Hemoglobin: 15.9 [KA]   2005 Glucose(!): 122 [KA]   2005 BUN: 18 [KA]   2005 Creatinine: 0.94 [KA]   2005 Sodium: 137 [KA]      ED Course User Index  [KA] Jeanne Moreno PA  [TD] Fermin Riggs II, MD        Reviewed pt's history and workup with Dr. Riggs.  After a bedside evaluation; they agree with the plan of care      Patient was placed in face mask in first look. Patient was wearing facemask each time I entered the room and throughout our encounter. I wore protective equipment throughout this patient encounter including a face mask, eye shield, and gloves. Hand hygiene was performed before donning protective equipment and after removal when leaving the room.        DIAGNOSIS  Final diagnoses:   Trapezius muscle spasm               Latest Documented Vital Signs:  As of 19:58  BP- 141/88 HR- 68 Temp- 96.9 °F (36.1 °C) (Tympanic) O2 sat- 99%       Jeanne Moreno PA  05/22/20 2043

## 2020-09-17 NOTE — PROGRESS NOTES
Subjective   History of Present Illness: Alli Bello is a 50 y.o. male is being seen for consultation today at the request of Bre Powell, * for tingling in his left arm. He had a C-RAYMUNDO on 09/11/20 and reports since then he has not has any neck pain. He denies weakness. He has also tried oral steroids which usually helps. He reports that he has been dealing with this for the past 18 months. He denies any specific injury.  He still has tingling in the left arm and count of a diffuse pattern.  He denies any bowel or bladder complaints.  Is fairly comfortable today    While in the room and during my examination of the patient I wore a mask and eye protection.  I washed my hands before and after this patient encounter.  The patient was also wearing a mask.    History of Present Illness    The following portions of the patient's history were reviewed and updated as appropriate: allergies, current medications, past family history, past medical history, past social history, past surgical history and problem list.    Review of Systems   Constitutional: Positive for activity change (decreased). Negative for fever.   HENT: Negative.  Negative for congestion.    Eyes: Negative.  Negative for visual disturbance.   Respiratory: Negative.  Negative for chest tightness and shortness of breath.    Cardiovascular: Negative.  Negative for chest pain.   Gastrointestinal: Negative.  Negative for nausea.   Endocrine: Negative.  Negative for cold intolerance and heat intolerance.   Genitourinary: Negative.  Negative for difficulty urinating.   Musculoskeletal: Negative.  Negative for neck pain and neck stiffness.   Skin: Negative.  Negative for rash.   Allergic/Immunologic: Negative.  Negative for environmental allergies.   Neurological: Negative.  Negative for headaches.   Hematological: Negative.  Does not bruise/bleed easily.   Psychiatric/Behavioral: Negative.  Negative for sleep disturbance.       Objective     Vitals:  "   09/22/20 1353   BP: 113/71   Pulse: 74   Temp: 98.4 °F (36.9 °C)   Weight: 117 kg (259 lb)   Height: 193 cm (76\")     Body mass index is 31.53 kg/m².      Physical Exam  Neurologic Exam    Physical Exam:    CONSTITUTIONAL: This 50 year old right handed  male appears well developed, well-nourished and in no acute distress.    HEAD & FACE: the head and face are symmetric, normocephalic and atraumatic.    EYES: Inspection of the conjunctivae and lids reveals no swelling, erythema or discharge.  Pupils are round, equal and reactive to light and there is no scleral icterus.    EARS, NOSE, MOUTH & THROAT: On inspection, the ears and nose are within normal limits.    NECK: the neck is supple and symmetric. The trachea is midline with no masses.    PULMONARY: Respiratory effort is normal with no increased work of breathing or signs of respiratory distress.    CARDIOVASCULAR: Pedal pulses are +2/4 bilaterally. Examination of the extremities shows no edema or varicosities.    LYMPHATIC: There is no palpable lymphadenopathy of the neck.    MUSCULOSKELETAL: Gait and station are within normal limits. The spine has normal alignment and range of motion.    SKIN: The skin is warm, dry and intact    NEUROLOGIC:   Cranial Nerves 2-12 intact  Normal motor strength noted. Muscle bulk and tone are normal.  Sensory exam is normal to fine touch to confrontational testing bilaterally despite the numb sensation or tingling that he gets in the left arm  Reflexes on the right side demonstrates 1/4 Triceps Reflex, 0/4 Biceps Reflex, 1/4 Brachioradialis Reflex, 1/4 Knee Jerk Reflex, 0/4 Ankle Jerk Reflex and no ankle clonus on the right.   Reflexes on the left side demonstrates 1/4 Triceps Reflex, 0/4 Biceps Reflex, 1/4 Brachioradialis Reflex, 1/4 Knee Jerk Reflex, 0/4 Ankle Jerk Reflex and no ankle clonus on the left.  Superficial/Primitive Reflexes: primitive reflexes were absent.  Hills's, Babinski, and Clonus signs all " negative.  No coordination deficit observed.  Radicular testing showed a negative Scott (ROBSON) test and negative straight leg raise.  Spurling's maneuver is negative  Cortical function is intact and without deficits. Speech is normal.    PSYCHIATRIC: oriented to person, place and time. Patient's mood and affect are normal.    Assessment/Plan   Independent Review of Radiographic Studies:      I personally reviewed the images from the following studies.    MRI of the cervical spine done at [facility] on May 30, 2020 reveals left-sided disc osteophyte complex at C6-C7 with nerve root compression.  There is right-sided uncovertebral joint hypertrophy with moderate to severe foraminal narrowing at C3-C4.  Other degenerative changes only create mild to no narrowing of the neuroforamen.    Medical Decision Making:      He has a C6-C7 disc herniation to the left with several episodes of radiculopathy over the last year or 18 months.  Able to get control of it with medical management but certainly the MRI is convincing of the threat to the neurologic elements.  He has no loss of nerve function on today's exam so therefore there is a possibility, approximately 80% possibility, that he would improve and avoid surgery with a physical therapy approach using traction.  He is willing to give that a try over the next couple to 3 weeks and we will see him back to see if he made progress.  He is inclined to cancel the next injection planned through interventional pain management to see how he does with the physical therapy and if he does not improve surgery may be the appropriate next step.    Return in about 3 weeks (around 10/13/2020) for discussion of Physical Therapy results.    Alli was seen today for neck pain.    Diagnoses and all orders for this visit:    Cervical disc herniation  -     Ambulatory Referral to Physical Therapy Evaluate and treat             Fermin Dunn MD FACS FAANS  Neurological  Surgery

## 2020-09-22 ENCOUNTER — OFFICE VISIT (OUTPATIENT)
Dept: NEUROSURGERY | Facility: CLINIC | Age: 50
End: 2020-09-22

## 2020-09-22 VITALS
BODY MASS INDEX: 31.54 KG/M2 | SYSTOLIC BLOOD PRESSURE: 113 MMHG | DIASTOLIC BLOOD PRESSURE: 71 MMHG | HEART RATE: 74 BPM | WEIGHT: 259 LBS | TEMPERATURE: 98.4 F | HEIGHT: 76 IN

## 2020-09-22 DIAGNOSIS — M50.20 CERVICAL DISC HERNIATION: Primary | ICD-10-CM

## 2020-09-22 PROCEDURE — 99244 OFF/OP CNSLTJ NEW/EST MOD 40: CPT | Performed by: NEUROLOGICAL SURGERY

## 2020-09-22 RX ORDER — DABIGATRAN ETEXILATE 75 MG/1
75 CAPSULE ORAL 2 TIMES DAILY
COMMUNITY
End: 2022-03-08 | Stop reason: SDUPTHER

## 2020-10-02 NOTE — PROGRESS NOTES
"Subjective   History of Present Illness: Alli Bello is a 50 y.o. male is here today for follow-up after going to physical therapy that was ordered for tingling in his left arm.     Today, Mr. Palmer reports that he went to physical therapy. He reports mild relief. He is still having tingling in his left arm but no pain or weakness. He denies neck and arm pain.  He has some occasional neck discomfort.  He says that the current symptomatology is not affecting his lifestyle and he can actually live with what he is dealing with now.  He is frustrated however that if it returns he thinks at that point he should probably consider surgery.    While in the room and during my examination of the patient I wore a mask and eye protection.  I washed my hands before and after this patient encounter.  The patient was also wearing a mask.    History of Present Illness    The following portions of the patient's history were reviewed and updated as appropriate: allergies, current medications, past family history, past medical history, past social history, past surgical history and problem list.    Review of Systems   Respiratory: Negative for chest tightness and shortness of breath.    Musculoskeletal: Negative for neck pain.   Neurological:        Tingling in the left arm         Objective     Vitals:    10/13/20 1547   BP: 148/77   Pulse: 68   Temp: 97.7 °F (36.5 °C)   Weight: 117 kg (259 lb)   Height: 193 cm (76\")     Body mass index is 31.53 kg/m².      Physical Exam  Neurologic Exam    Physical Exam:    CONSTITUTIONAL:  appears well developed, well-nourished and in no acute distress.    NECK: the neck is supple and symmetric. The trachea is midline with no masses.  Good range of motion today    PULMONARY: Respiratory effort is normal with no increased work of breathing or signs of respiratory distress.    CARDIOVASCULAR: Pedal pulses are +2/4 bilaterally. Examination of the extremities shows no edema or " varicosities.    MUSCULOSKELETAL: Gait normal    SKIN: The skin is warm, dry and intact.     NEUROLOGIC:   Cranial Nerves 2-12 intact  Normal motor strength noted. Muscle bulk and tone are normal.  Sensory exam is normal to fine touch to confrontational testing bilaterally despite the numb sensation or tingling that he gets in the left arm  Reflexes on the right side demonstrates 1/4 Triceps Reflex, 0/4 Biceps Reflex, 1/4 Brachioradialis Reflex, 1/4 Knee Jerk Reflex, 0/4 Ankle Jerk Reflex and no ankle clonus on the right.   Reflexes on the left side demonstrates 1/4 Triceps Reflex, 0/4 Biceps Reflex, 1/4 Brachioradialis Reflex, 1/4 Knee Jerk Reflex, 0/4 Ankle Jerk Reflex and no ankle clonus on the left.  Superficial/Primitive Reflexes: primitive reflexes were absent.  Hills's, Babinski, and Clonus signs all negative.  No coordination deficit observed.  Radicular testing showed a negative Scott (ROBSON) test and negative straight leg raise.  Spurling's maneuver is negative  Cortical function is intact and without deficits. Speech is normal.    PSYCHIATRIC: oriented to person, place and time. Patient's mood and affect are normal.      Assessment/Plan   Independent Review of Radiographic Studies:      I personally reviewed the images from the following studies.    MRI of the cervical spine done at [facility] on May 30, 2020 reveals left-sided disc osteophyte complex at C6-C7 with nerve root compression.  There is right-sided uncovertebral joint hypertrophy with moderate to severe foraminal narrowing at C3-C4.  Other degenerative changes only create mild to no narrowing of the neuroforamen.    Medical Decision Making:      I certainly agree that between my clinical examination today and his physical presentation and description of symptoms I do not think surgery is justified he certainly does not have any obvious hard and fast findings consistent with C7 radiculopathy at the present time.  However, if he  develops recurrent severe arm pain in the upcoming weeks or months I would agree with him that an operative intervention would be considered.  I did warn him that if he had is symptom-free for years obviously we would have to repeat imaging but if the condition of his neck worsens at C6-C7 despite all the conservative effort we provided then additional conservative-ism may not be in his best interest in surgery at that point could be considered.  See him back if he develops recurrent arm pain or other neurologic complaint.    Return if symptoms worsen or fail to improve.    Diagnoses and all orders for this visit:    1. Cervical disc herniation (Primary)             Fermin Dunn MD FACS FA[facility]  Neurological Surgery

## 2020-10-13 ENCOUNTER — OFFICE VISIT (OUTPATIENT)
Dept: NEUROSURGERY | Facility: CLINIC | Age: 50
End: 2020-10-13

## 2020-10-13 VITALS
HEART RATE: 68 BPM | BODY MASS INDEX: 31.54 KG/M2 | DIASTOLIC BLOOD PRESSURE: 77 MMHG | WEIGHT: 259 LBS | SYSTOLIC BLOOD PRESSURE: 148 MMHG | TEMPERATURE: 97.7 F | HEIGHT: 76 IN

## 2020-10-13 DIAGNOSIS — M50.20 CERVICAL DISC HERNIATION: Primary | ICD-10-CM

## 2020-10-13 PROCEDURE — 99213 OFFICE O/P EST LOW 20 MIN: CPT | Performed by: NEUROLOGICAL SURGERY

## 2021-03-19 ENCOUNTER — OFFICE VISIT (OUTPATIENT)
Dept: INTERNAL MEDICINE | Facility: CLINIC | Age: 51
End: 2021-03-19

## 2021-03-19 ENCOUNTER — TELEPHONE (OUTPATIENT)
Dept: INTERNAL MEDICINE | Facility: CLINIC | Age: 51
End: 2021-03-19

## 2021-03-19 VITALS
WEIGHT: 259 LBS | DIASTOLIC BLOOD PRESSURE: 72 MMHG | OXYGEN SATURATION: 98 % | HEIGHT: 76 IN | HEART RATE: 74 BPM | RESPIRATION RATE: 16 BRPM | SYSTOLIC BLOOD PRESSURE: 128 MMHG | BODY MASS INDEX: 31.54 KG/M2 | TEMPERATURE: 97.6 F

## 2021-03-19 DIAGNOSIS — S39.012D LUMBOSACRAL STRAIN, SUBSEQUENT ENCOUNTER: Primary | ICD-10-CM

## 2021-03-19 PROCEDURE — 99214 OFFICE O/P EST MOD 30 MIN: CPT | Performed by: INTERNAL MEDICINE

## 2021-03-19 RX ORDER — MELOXICAM 15 MG/1
15 TABLET ORAL DAILY
Qty: 30 TABLET | Refills: 2 | Status: SHIPPED | OUTPATIENT
Start: 2021-03-19 | End: 2022-03-08

## 2021-03-19 RX ORDER — METAXALONE 800 MG/1
800 TABLET ORAL 3 TIMES DAILY PRN
Qty: 90 TABLET | Refills: 2 | Status: SHIPPED | OUTPATIENT
Start: 2021-03-19 | End: 2022-04-14

## 2021-03-19 RX ORDER — METHOCARBAMOL 500 MG/1
1500 TABLET, FILM COATED ORAL 3 TIMES DAILY
Qty: 90 TABLET | Refills: 3 | Status: SHIPPED | OUTPATIENT
Start: 2021-03-19 | End: 2022-04-14

## 2021-03-19 NOTE — TELEPHONE ENCOUNTER
Insurance will not cover Metaxalone 800mg but will cover methocarbamol, can you please send in new rx to [facility] early

## 2021-03-19 NOTE — PROGRESS NOTES
"Chief Complaint  Pain (Lumbar )    Subjective          Alli Bello presents to [facility]  Here with low back pain for last 5 days; was working on his hot tub and had strain at that time; has improved a little since last visit; non radiating, no weakness, no loss of urine, stool, saddle anesthesia; no weakness; did have an rx for gabapentin and tried this, seemed to help a lot he states      Objective   Vital Signs:   /72 (BP Location: Left arm, Patient Position: Sitting, Cuff Size: Adult)   Pulse 74   Temp 97.6 °F (36.4 °C)   Resp 16   Ht 193 cm (76\")   Wt 117 kg (259 lb)   SpO2 98%   BMI 31.53 kg/m²     Physical Exam  Constitutional:       Appearance: Normal appearance.   HENT:      Head: Normocephalic and atraumatic.      Right Ear: External ear normal.      Left Ear: External ear normal.      Nose: Nose normal.      Mouth/Throat:      Mouth: Mucous membranes are moist.   Eyes:      Extraocular Movements: Extraocular movements intact.      Conjunctiva/sclera: Conjunctivae normal.   Pulmonary:      Effort: Pulmonary effort is normal. No respiratory distress.   Musculoskeletal:         General: Normal range of motion.      Cervical back: Normal range of motion.      Comments: 5/5 strength flex/extend at hips and knees, 2+ DTRs at patellae   Neurological:      General: No focal deficit present.      Mental Status: He is alert. Mental status is at baseline.   Psychiatric:         Mood and Affect: Mood normal.         Behavior: Behavior normal.         Thought Content: Thought content normal.         Judgment: Judgment normal.        Result Review :                 Assessment and Plan    Diagnoses and all orders for this visit:    1. Lumbosacral strain, subsequent encounter (Primary)  -     meloxicam (MOBIC) 15 MG tablet; Take 1 tablet by mouth Daily.  Dispense: 30 tablet; Refill: 2  -     metaxalone (Skelaxin) 800 MG tablet; Take 1 tablet by mouth 3 " (Three) Times a Day As Needed for Muscle Spasms.  Dispense: 90 tablet; Refill: 2  -     Ambulatory Referral to Physical Therapy Evaluate and treat    - counseled and discussed supportive care, stretching, exercise  - rtc 6 weeks, worsening, change in illness      Follow Up   Return in about 6 weeks (around 4/30/2021).  Patient was given instructions and counseling regarding his condition or for health maintenance advice. Please see specific information pulled into the AVS if appropriate.

## 2021-04-30 ENCOUNTER — OFFICE VISIT (OUTPATIENT)
Dept: INTERNAL MEDICINE | Facility: CLINIC | Age: 51
End: 2021-04-30

## 2021-04-30 VITALS
SYSTOLIC BLOOD PRESSURE: 118 MMHG | HEART RATE: 64 BPM | BODY MASS INDEX: 31.17 KG/M2 | OXYGEN SATURATION: 98 % | HEIGHT: 76 IN | WEIGHT: 256 LBS | TEMPERATURE: 96.8 F | DIASTOLIC BLOOD PRESSURE: 78 MMHG | RESPIRATION RATE: 16 BRPM

## 2021-04-30 DIAGNOSIS — F41.9 ANXIETY: Primary | Chronic | ICD-10-CM

## 2021-04-30 PROBLEM — S86.012A ACHILLES RUPTURE, LEFT: Status: RESOLVED | Noted: 2019-03-25 | Resolved: 2021-04-30

## 2021-04-30 PROBLEM — E66.9 OBESITY (BMI 30-39.9): Status: RESOLVED | Noted: 2018-05-04 | Resolved: 2021-04-30

## 2021-04-30 PROBLEM — R06.83 SNORES: Status: RESOLVED | Noted: 2017-10-16 | Resolved: 2021-04-30

## 2021-04-30 PROCEDURE — 99214 OFFICE O/P EST MOD 30 MIN: CPT | Performed by: INTERNAL MEDICINE

## 2021-04-30 RX ORDER — CITALOPRAM 20 MG/1
20 TABLET ORAL EVERY MORNING
Qty: 90 TABLET | Refills: 2 | Status: SHIPPED | OUTPATIENT
Start: 2021-04-30 | End: 2022-01-06 | Stop reason: SDUPTHER

## 2021-04-30 RX ORDER — LORAZEPAM 0.5 MG/1
0.25 TABLET ORAL EVERY 8 HOURS PRN
Qty: 30 TABLET | Refills: 0 | Status: SHIPPED | OUTPATIENT
Start: 2021-04-30 | End: 2022-01-06 | Stop reason: SDUPTHER

## 2021-04-30 NOTE — ASSESSMENT & PLAN NOTE
CONTROLLED  - cont on celexa at current dose--> refills sent  - Reviewed potential side effects of medication including sexual performance changes, insomnia, fatigue, weight changes. Pt tolerating well without any issues.   - has lorazepam for prn use with panic attacks--> refills sent today  - Reviewed controlled nature of substance, potential for abuse/addiction, and possible adverse effects of medication. No red flags for abuse at this time.   - Controlled substances contract signed and in chart.   - Annual UDS screening collected today  - Kevin checked and appropriate.

## 2021-04-30 NOTE — PROGRESS NOTES
"Chief Complaint  Follow-up, Back Pain, Med Refill, and Anxiety    Subjective          Alli Bello presents to [facility] for follow up and med refills. Pt taking all medications daily as prescribed with good reported compliance. No issues or side effects with meds. Takes celexa daily for anxiety, feels like things are going well. Has lorazepam for prn use with onset of anxiety attacks- typically uses less than 1/month. Does need more when traveling for work.       Objective   Vital Signs:     /78   Pulse 64   Temp 96.8 °F (36 °C)   Resp 16   Ht 193 cm (76\")   Wt 116 kg (256 lb)   SpO2 98%   BMI 31.16 kg/m²     Physical Exam  Vitals and nursing note reviewed.   Constitutional:       General: He is not in acute distress.     Appearance: Normal appearance.   Cardiovascular:      Rate and Rhythm: Normal rate and regular rhythm.      Pulses: Normal pulses.      Heart sounds: Normal heart sounds. No murmur heard.     Pulmonary:      Effort: Pulmonary effort is normal. No respiratory distress.      Breath sounds: Normal breath sounds.   Abdominal:      General: Abdomen is flat. Bowel sounds are normal.      Palpations: Abdomen is soft.      Tenderness: There is no abdominal tenderness.   Musculoskeletal:      Right lower leg: No edema.      Left lower leg: No edema.   Neurological:      Mental Status: He is alert and oriented to person, place, and time. Mental status is at baseline.   Psychiatric:         Mood and Affect: Mood normal.         Behavior: Behavior normal.          Result Review : : None       Assessment and Plan      Diagnoses and all orders for this visit:    1. Anxiety (Primary)  Assessment & Plan:  CONTROLLED  - cont on celexa at current dose--> refills sent  - Reviewed potential side effects of medication including sexual performance changes, insomnia, fatigue, weight changes. Pt tolerating well without any issues.   - has lorazepam for prn " use with panic attacks--> refills sent today  - Reviewed controlled nature of substance, potential for abuse/addiction, and possible adverse effects of medication. No red flags for abuse at this time.   - Controlled substances contract signed and in chart.   - Annual UDS screening collected today  - Kevin checked and appropriate.         Orders:  -     citalopram (CeleXA) 20 MG tablet; Take 1 tablet by mouth Every Morning.  Dispense: 90 tablet; Refill: 2  -     [id number] 9+Oxycodone+Crt-Unbund - Urine, Clean Catch  -     LORazepam (ATIVAN) 0.5 MG tablet; Take 0.5 tablets by mouth Every 8 (Eight) Hours As Needed for Anxiety.  Dispense: 30 tablet; Refill: 0    Follow Up   Return in about 9 months (around 1/30/2022) for Annual physical.    Patient was given instructions and counseling regarding his condition or for health maintenance advice. Please see specific information pulled into the AVS if appropriate.     Josie Vega MD  [facility] [place] [facility]  Phone: [phone]  Fax: [phone]

## 2021-05-01 LAB
AMPHETAMINES UR QL SCN: NEGATIVE NG/ML
BARBITURATES UR QL SCN: NEGATIVE NG/ML
BENZODIAZ UR QL SCN: NEGATIVE NG/ML
BZE UR QL SCN: NEGATIVE NG/ML
CANNABINOIDS UR QL SCN: NEGATIVE NG/ML
CREAT UR-MCNC: 260.6 MG/DL (ref 20–300)
LABORATORY COMMENT REPORT: NORMAL
METHADONE UR QL SCN: NEGATIVE NG/ML
OPIATES UR QL SCN: NEGATIVE NG/ML
OXYCODONE+OXYMORPHONE UR QL SCN: NEGATIVE NG/ML
PCP UR QL: NEGATIVE NG/ML
PH UR: 5.3 [PH] (ref 4.5–8.9)
PROPOXYPH UR QL SCN: NEGATIVE NG/ML

## 2021-12-27 DIAGNOSIS — Z11.52 ENCOUNTER FOR SCREENING FOR COVID-19: Primary | ICD-10-CM

## 2021-12-28 ENCOUNTER — LAB (OUTPATIENT)
Dept: LAB | Facility: HOSPITAL | Age: 51
End: 2021-12-28

## 2021-12-28 PROCEDURE — 86769 SARS-COV-2 COVID-19 ANTIBODY: CPT | Performed by: NURSE PRACTITIONER

## 2022-01-06 ENCOUNTER — OFFICE VISIT (OUTPATIENT)
Dept: INTERNAL MEDICINE | Facility: CLINIC | Age: 52
End: 2022-01-06

## 2022-01-06 VITALS
DIASTOLIC BLOOD PRESSURE: 88 MMHG | HEART RATE: 67 BPM | RESPIRATION RATE: 16 BRPM | BODY MASS INDEX: 31.05 KG/M2 | SYSTOLIC BLOOD PRESSURE: 136 MMHG | HEIGHT: 76 IN | TEMPERATURE: 97.7 F | WEIGHT: 255 LBS | OXYGEN SATURATION: 97 %

## 2022-01-06 DIAGNOSIS — F41.9 ANXIETY: Chronic | ICD-10-CM

## 2022-01-06 DIAGNOSIS — M25.511 ACUTE PAIN OF RIGHT SHOULDER: Primary | ICD-10-CM

## 2022-01-06 PROCEDURE — 99214 OFFICE O/P EST MOD 30 MIN: CPT | Performed by: INTERNAL MEDICINE

## 2022-01-06 RX ORDER — CITALOPRAM 20 MG/1
20 TABLET ORAL EVERY MORNING
Qty: 90 TABLET | Refills: 1 | Status: SHIPPED | OUTPATIENT
Start: 2022-01-06

## 2022-01-06 RX ORDER — LORAZEPAM 0.5 MG/1
0.25 TABLET ORAL EVERY 8 HOURS PRN
Qty: 30 TABLET | Refills: 0 | Status: SHIPPED | OUTPATIENT
Start: 2022-01-06 | End: 2022-04-28 | Stop reason: SDUPTHER

## 2022-01-06 NOTE — ASSESSMENT & PLAN NOTE
CONTROLLED  - cont on celexa at current dose--> refills sent  - Reviewed potential side effects of medication including sexual performance changes, insomnia, fatigue, weight changes. Pt tolerating well without any issues.   - has lorazepam for prn use with panic attacks, typically worse with traveling and he has several tips upcoming--> refills sent today  - Reviewed controlled nature of substance, potential for abuse/addiction, and possible adverse effects of medication. No red flags for abuse at this time.   - Controlled substances contract signed and in chart.   - Annual UDS screening collected today  - Kevin checked and appropriate.

## 2022-01-06 NOTE — PATIENT INSTRUCTIONS
[facility] ([address])    [facility] ([place])    [place] ([place] across from the [place])    Call back to office with decision regarding out of pocket pay vs insurance (would need XR prior to MRI if using insurance) and location you would like to have imaging done.

## 2022-01-06 NOTE — PROGRESS NOTES
"Chief Complaint  Arm Pain    Subjective          Alli Bello presents to [facility] for R arm pain x 3 months. Started after a pulling injury involving a trailer hitch in Sept, has continued to be painful. It slightly better than the initial injury, but still persistent and now normal. Pain surrounds lifting and overheard lifting. Did hear a pop at the time of injury. Does feel some weakness in his R arm related to pain, no numbness or tingling. Has not taken any OTC meds or other treatments.     Objective   Vital Signs:     /88   Pulse 67   Temp 97.7 °F (36.5 °C)   Resp 16   Ht 193 cm (76\")   Wt 116 kg (255 lb)   SpO2 97%   BMI 31.04 kg/m²     Physical Exam  Vitals and nursing note reviewed.   Constitutional:       General: He is not in acute distress.     Appearance: Normal appearance.   HENT:      Head: Normocephalic.   Eyes:      General:         Right eye: No discharge.         Left eye: No discharge.      Pupils: Pupils are equal, round, and reactive to light.   Cardiovascular:      Rate and Rhythm: Normal rate.   Pulmonary:      Effort: Pulmonary effort is normal. No respiratory distress.   Musculoskeletal:      Cervical back: Normal range of motion.      Comments: TTP over superior insertion point of head of bicep on R arm, full active ROM but with pain on full abduction and behind the back reaching, 5/5 strength but with noticeable pain on resisted front raise and elbow extension, neg drop arm test, positive empty can test, neg impingement signs   Neurological:      Mental Status: He is alert.   Psychiatric:         Mood and Affect: Mood normal.         Behavior: Behavior normal.         Thought Content: Thought content normal.          Result Review : : None    Assessment and Plan      Diagnoses and all orders for this visit:    1. Acute pain of right shoulder (Primary)     - given mechanism of injury, sound of pop, and residual pain, could " very well represent a TC tear or biceps tendon tear   - discussed imaging, will proceed with MRI, pt to determine where he would like to have the scan done and call back to office, will order XR if necessary with insurance   - based on results, will refer on to PT vs ortho    2. Anxiety  Assessment & Plan:  CONTROLLED  - cont on celexa at current dose--> refills sent  - Reviewed potential side effects of medication including sexual performance changes, insomnia, fatigue, weight changes. Pt tolerating well without any issues.   - has lorazepam for prn use with panic attacks, typically worse with traveling and he has several tips upcoming--> refills sent today  - Reviewed controlled nature of substance, potential for abuse/addiction, and possible adverse effects of medication. No red flags for abuse at this time.   - Controlled substances contract signed and in chart.   - Annual UDS screening collected today  - Kevin checked and appropriate.     Orders:  -     citalopram (CeleXA) 20 MG tablet; Take 1 tablet by mouth Every Morning.  Dispense: 90 tablet; Refill: 1  -     LORazepam (ATIVAN) 0.5 MG tablet; Take 0.5 tablets by mouth Every 8 (Eight) Hours As Needed for Anxiety.  Dispense: 30 tablet; Refill: 0    Follow Up   Return in about 6 months (around 7/6/2022) for Annual physical.    Patient was given instructions and counseling regarding his condition or for health maintenance advice. Please see specific information pulled into the AVS if appropriate.     Josie Vega MD  [facility] [place] [facility]  Phone: [phone]  Fax: [phone]

## 2022-02-10 ENCOUNTER — TELEPHONE (OUTPATIENT)
Dept: INTERNAL MEDICINE | Facility: CLINIC | Age: 52
End: 2022-02-10

## 2022-02-10 DIAGNOSIS — M25.511 ACUTE PAIN OF RIGHT SHOULDER: Primary | ICD-10-CM

## 2022-02-10 NOTE — TELEPHONE ENCOUNTER
Caller: Alli Bello    Relationship: Self    Best call back number: [phone]    What orders are you requesting (i.e. lab or imaging): MRI    In what timeframe would the patient need to come in: ASAP    Where will you receive your lab/imaging services: [facility]     Additional notes: HE WOULD LIKE TO JUST HAVE MRI DONE AND NOT GO THRU INSURANCE

## 2022-03-08 ENCOUNTER — OFFICE VISIT (OUTPATIENT)
Dept: INTERNAL MEDICINE | Facility: CLINIC | Age: 52
End: 2022-03-08

## 2022-03-08 ENCOUNTER — OFFICE VISIT (OUTPATIENT)
Dept: CARDIOLOGY | Facility: CLINIC | Age: 52
End: 2022-03-08

## 2022-03-08 VITALS
DIASTOLIC BLOOD PRESSURE: 70 MMHG | WEIGHT: 265 LBS | SYSTOLIC BLOOD PRESSURE: 110 MMHG | OXYGEN SATURATION: 96 % | RESPIRATION RATE: 16 BRPM | BODY MASS INDEX: 32.27 KG/M2 | HEART RATE: 65 BPM | HEIGHT: 76 IN

## 2022-03-08 VITALS
WEIGHT: 255 LBS | HEIGHT: 76 IN | HEART RATE: 66 BPM | TEMPERATURE: 96.8 F | DIASTOLIC BLOOD PRESSURE: 80 MMHG | OXYGEN SATURATION: 98 % | BODY MASS INDEX: 31.05 KG/M2 | SYSTOLIC BLOOD PRESSURE: 112 MMHG | RESPIRATION RATE: 16 BRPM

## 2022-03-08 DIAGNOSIS — Z99.89 OSA ON CPAP: Chronic | ICD-10-CM

## 2022-03-08 DIAGNOSIS — S46.011D TRAUMATIC INCOMPLETE TEAR OF RIGHT ROTATOR CUFF, SUBSEQUENT ENCOUNTER: Primary | ICD-10-CM

## 2022-03-08 DIAGNOSIS — R09.82 POSTNASAL DRIP: ICD-10-CM

## 2022-03-08 DIAGNOSIS — E66.9 OBESITY (BMI 30.0-34.9): ICD-10-CM

## 2022-03-08 DIAGNOSIS — I48.0 PAF (PAROXYSMAL ATRIAL FIBRILLATION): Primary | Chronic | ICD-10-CM

## 2022-03-08 DIAGNOSIS — G47.33 OSA ON CPAP: Chronic | ICD-10-CM

## 2022-03-08 PROCEDURE — 99213 OFFICE O/P EST LOW 20 MIN: CPT | Performed by: INTERNAL MEDICINE

## 2022-03-08 PROCEDURE — 93000 ELECTROCARDIOGRAM COMPLETE: CPT | Performed by: NURSE PRACTITIONER

## 2022-03-08 PROCEDURE — 99214 OFFICE O/P EST MOD 30 MIN: CPT | Performed by: NURSE PRACTITIONER

## 2022-03-08 RX ORDER — DABIGATRAN ETEXILATE 75 MG/1
75 CAPSULE ORAL 2 TIMES DAILY
Qty: 60 CAPSULE | Refills: 1 | Status: SHIPPED | OUTPATIENT
Start: 2022-03-08 | End: 2022-03-17

## 2022-03-08 NOTE — PROGRESS NOTES
"  Date of Office Visit: 2022  Encounter Provider: DIONICIO Thomas  Place of Service: [facility]  Patient Name: Alli Bello  :1970  Primary Cardiologist: Dr. Kelly    CC:  Having more episodes of a fib    Dear Dr. Vega    HPI: Alli Bello is a pleasant 52 y.o. male who presents 2022 for cardiac follow up. I have reviewed his past medical records including notes, labs and testing in preparation for today's visit.  He has a history of paroxysmal atrial fibrillation.  He was last seen in the office on 2019 for preop cardiac clearance for left Achilles tendon injury.     He has a long history of paroxysmal atrial fibrillation.  He is been managed with \"a pill in the pocket technique\" with flecainide.  In the past he has been seen by Dr. Granado and Dr. Desiree Carpenter.  He was last seen in their office in .  Typically, he was instructed to take three 150  mg tablets of flecainide if he goes into atrial fibrillation.  Additionally, he has samples of Bystolic and Pradaxa which he has also been instructed to take if he goes into atrial fibrillation.  In the past, he would also then be seen in the office if he had recurrent atrial fibrillation.  He has episodes atrial fibrillation extremely rarely.  Usually they start after he has done some extreme exercise.  He has to very rarely take the flecainide.     Whenever he takes the flecainide he will go back into sinus rhythm usually within 6-8 hours.  In the past it has been discussed to  either  transition over to routine echo therapy or ablation, and he had stated that since the A. fib is so infrequent and does not really bother him he was not interested in either 1 of those choices.      He comes in today because he feels like he is having more episodes of palpitations and has had 2 episodes of atrial fibrillation in the last 5 weeks.  The last was Thursday a week ago.  He did take his " "flecainide for and 50 mg, Bystolic 10 mg and Pradaxa 150 mg twice daily.  He feels like he is also had more palpitations in between these episodes.  He states they again last between 6 to 8 hours and are fairly easily controlled with his medications.  He did have some dizziness with his 2 episodes recently.  He denies any chest pain chest pressure or fatigue.  He denies any shortness of breath, lower extremity edema, syncope or presyncopal episodes.  He states he has been doing this routine for \"years\".  It seems to work for him.  He was just concerned that he had not been seen in a while and that he had had 2 recent episodes.     He has not had any other cardiac symptoms.  He denies any chest pain, pressure, tightness, squeezing, or heartburn.  He has not experienced any feeling of  presyncope or syncope.  There has not been any problems with dizziness or lightheadedness.  There has not been any orthopnea or PND, and no problems with lower extremity edema.  He denies any shortness of breath at rest or with activity and has not had any wheezing.  He has not had any problems with unexplained nausea or vomiting. He has continued to perform daily activities of living without any specific problem or change in the level of activity.  He has not been recently hospitalized for any reason.     Past Medical History:   Diagnosis Date   • Achilles tendon injury     LEFT, SCHEDULED FOR REPAIR   • Anxiety attack    • Aortic stenosis    • Atrial fibrillation (HCC)    • Deep venous thrombosis of calf (HCC)     right   • THOMAS (dyspnea on exertion)     since fib started   • Fatigue     mild   • Lightheaded    • Malaise and fatigue    • PAC (premature atrial contraction)     very rare   • PAF (paroxysmal atrial fibrillation) ([facility])    • Palpitations     gets lightheaded with it   • Pulmonary nodule    • Sleep apnea     TO BRING DOS, CPAP   • Snoring     no testing   • SOB (shortness of breath)     mildly   • Syncope     ONLY WITH A-FIB "   • Syncope and collapse        Past Surgical History:   Procedure Laterality Date   • ACHILLES TENDON SURGERY Left 3/27/2019    Procedure: ACHILLES TENDON REPAIR;  Surgeon: Angel Baez MD;  Location: [facility];  Service: Orthopedics   • CARDIOVERSION  1998    D/T A FIB   • NO PAST SURGERIES         Social History     Socioeconomic History   • Marital status:    Tobacco Use   • Smoking status: Never Smoker   • Smokeless tobacco: Never Used   • Tobacco comment: caffine use   Substance and Sexual Activity   • Alcohol use: Yes     Alcohol/week: 5.0 - 6.0 standard drinks     Types: 5 - 6 Standard drinks or equivalent per week     Comment: 2-3 nights/week   • Drug use: No     Comment: Reports drinking one caffeinated beverage per day   • Sexual activity: Defer       Family History   Problem Relation Age of Onset   • Heart disease Father    • Hypertension Father    • Coronary artery disease Father         CAD/CHD <61yo   • Hyperlipidemia Father    • Heart attack Father    • Stroke Paternal Grandmother    • Arrhythmia Mother    • Atrial fibrillation Mother    • Arrhythmia Brother    • Atrial fibrillation Brother        The following portion of the patient's history were reviewed and updated as appropriate: past medical history, past surgical history, past social history, past family history, allergies, current medications, and problem list.    Review of Systems   Constitutional: Negative for diaphoresis, fever and malaise/fatigue.   HENT: Negative for congestion, hearing loss, hoarse voice, nosebleeds and sore throat.    Eyes: Negative for photophobia, vision loss in left eye, vision loss in right eye and visual disturbance.   Cardiovascular: Positive for irregular heartbeat and palpitations. Negative for chest pain, dyspnea on exertion, leg swelling, near-syncope, orthopnea, paroxysmal nocturnal dyspnea and syncope.   Respiratory: Negative for cough, hemoptysis, shortness of breath, sleep disturbances due  to breathing, snoring, sputum production and wheezing.    Endocrine: Negative for cold intolerance, heat intolerance, polydipsia, polyphagia and polyuria.   Hematologic/Lymphatic: Negative for bleeding problem. Does not bruise/bleed easily.   Skin: Negative for color change, dry skin, poor wound healing, rash and suspicious lesions.   Musculoskeletal: Negative for arthritis, back pain, falls, gout, joint pain, joint swelling, muscle cramps, muscle weakness and myalgias.   Gastrointestinal: Negative for bloating, abdominal pain, constipation, diarrhea, dysphagia, melena, nausea and vomiting.   Neurological: Positive for dizziness. Negative for excessive daytime sleepiness, headaches, light-headedness, loss of balance, numbness, paresthesias, seizures, vertigo and weakness.   Psychiatric/Behavioral: Negative for depression, memory loss and substance abuse. The patient is not nervous/anxious.        No Known Allergies      Current Outpatient Medications:   •  citalopram (CeleXA) 20 MG tablet, Take 1 tablet by mouth Every Morning., Disp: 90 tablet, Rfl: 1  •  dabigatran etexilate (PRADAXA) 75 MG capsule, Take 75 mg by mouth 2 (Two) Times a Day. Take with onset of AFib, Disp: , Rfl:   •  flecainide (TAMBOCOR) 100 MG tablet, Take 450 mg by mouth Every Other Day As Needed (IF NEEDED TO TAKE 2 DOSES, REPORT TO CARDIOLOGIST, PRN FOR A-FIB)., Disp: , Rfl:   •  LORazepam (ATIVAN) 0.5 MG tablet, Take 0.5 tablets by mouth Every 8 (Eight) Hours As Needed for Anxiety., Disp: 30 tablet, Rfl: 0  •  metaxalone (Skelaxin) 800 MG tablet, Take 1 tablet by mouth 3 (Three) Times a Day As Needed for Muscle Spasms., Disp: 90 tablet, Rfl: 2  •  methocarbamol (Robaxin) 500 MG tablet, Take 3 tablets by mouth 3 (Three) Times a Day., Disp: 90 tablet, Rfl: 3  •  nebivolol (BYSTOLIC) 10 MG tablet, Take 10 mg by mouth Daily As Needed (FOR A-FIB FOR 2 DOSES IF STILL IN AFIB, REPORT TO CARDIOLOGIST OFFICE)., Disp: , Rfl:         Objective:  "    Vitals:    03/08/22 1422   BP: 110/70   Pulse: 65   Resp: 16   SpO2: 96%   Weight: 120 kg (265 lb)   Height: 193 cm (76\")     Body mass index is 32.26 kg/m².      Vitals reviewed.   Constitutional:       General: Not in acute distress.     Appearance: Normal and healthy appearance. Well-developed and overweight.   Eyes:      General:         Right eye: No discharge.         Left eye: No discharge.      Conjunctiva/sclera: Conjunctivae normal.   HENT:      Head: Normocephalic and atraumatic.      Right Ear: External ear normal.      Left Ear: External ear normal.      Nose: Nose normal.   Neck:      Thyroid: No thyromegaly.      Vascular: No JVD.      Trachea: No tracheal deviation.      Lymphadenopathy: No cervical adenopathy.   Pulmonary:      Effort: Pulmonary effort is normal. No respiratory distress.      Breath sounds: Normal breath sounds. No wheezing. No rales.   Chest:      Chest wall: Not tender to palpatation.   Cardiovascular:      Normal rate. Regular rhythm.      No gallop.   Pulses:     Intact distal pulses.   Edema:     Peripheral edema absent.   Abdominal:      General: There is no distension.      Palpations: Abdomen is soft.      Tenderness: There is no abdominal tenderness.   Musculoskeletal: Normal range of motion.         General: No tenderness or deformity.      Cervical back: Normal range of motion and neck supple. Skin:     General: Skin is warm and dry.      Findings: No erythema or rash.   Neurological:      Mental Status: Alert and oriented to person, place, and time.      Coordination: Coordination normal.   Psychiatric:         Attention and Perception: Attention normal.         Mood and Affect: Mood normal.         Speech: Speech normal.         Behavior: Behavior normal. Behavior is cooperative.         Thought Content: Thought content normal.         Cognition and Memory: Cognition normal.         Judgment: Judgment normal.               ECG 12 Lead    Date/Time: 3/8/2022 2:41 " "PM  Performed by: Desiree Hernández APRN  Authorized by: Desiree Hernández APRN   Comparison: compared with previous ECG from 5/20/2020  Similar to previous ECG  Rhythm: sinus rhythm  Rate: normal  Conduction: conduction normal  ST Segments: ST segments normal  T Waves: T waves normal  QRS axis: normal    Clinical impression: normal ECG              Assessment:       Diagnosis Plan   1. PAF (paroxysmal atrial fibrillation) (HCC)     2. Obesity (BMI 30.0-34.9)     3. MIKA on CPAP            Plan:       1.  PAF-he continues to use flecainide, Bystolic and Pradaxa as \"pill in the pocket\" therapy.  This normally works for him and he converts within 6 to 8 hours.  Rarely does it take longer but it has at times.  He has had 2 episodes in the last 5 weeks.  The most recent was a week ago.  We will get a check an echo and a Holter monitor as he has not had any testing in literally years.    2.  Obesity-he would benefit from a weight loss program.    3.  MIKA-compliant with CPAP therapy.    Check echo and Holter    As always, it has been a pleasure to participate in your patient's care. Thank you.       Sincerely,       DIONICIO Thomas      Current Outpatient Medications:   •  citalopram (CeleXA) 20 MG tablet, Take 1 tablet by mouth Every Morning., Disp: 90 tablet, Rfl: 1  •  dabigatran etexilate (PRADAXA) 75 MG capsule, Take 1 capsule by mouth 2 (Two) Times a Day. Take with onset of AFib, Disp: 60 capsule, Rfl: 1  •  flecainide (TAMBOCOR) 100 MG tablet, Take 450 mg by mouth Every Other Day As Needed (IF NEEDED TO TAKE 2 DOSES, REPORT TO CARDIOLOGIST, PRN FOR A-FIB)., Disp: , Rfl:   •  LORazepam (ATIVAN) 0.5 MG tablet, Take 0.5 tablets by mouth Every 8 (Eight) Hours As Needed for Anxiety., Disp: 30 tablet, Rfl: 0  •  metaxalone (Skelaxin) 800 MG tablet, Take 1 tablet by mouth 3 (Three) Times a Day As Needed for Muscle Spasms., Disp: 90 tablet, Rfl: 2  •  methocarbamol (Robaxin) 500 MG tablet, Take 3 tablets by mouth 3 (Three) Times " a Day., Disp: 90 tablet, Rfl: 3  •  nebivolol (BYSTOLIC) 10 MG tablet, Take 10 mg by mouth Daily As Needed (FOR A-FIB FOR 2 DOSES IF STILL IN AFIB, REPORT TO CARDIOLOGIST OFFICE)., Disp: , Rfl:       Dictated utilizing Dragon dictation

## 2022-03-08 NOTE — PROGRESS NOTES
"Chief Complaint  Results (/) and Shoulder Pain    Subjective          Alli Bello presents to [facility] for follow up on shoulder pain and MRI. Pain has been ongoing since sept after pulling injury. Has tried only OTC treatments, no PT yet. Has been Rx meloxicam and muscle relaxer but has not been using routinely.   Feels like his throat is red x 4 days. Woke up with mucus sensation in the back of his throat, has imrpoved, but still feels like he needs to clear his throat.   Had a injury where he was thrown off a horse 1 month ago, landed on his R elbow. Feels like pressure over the area where he fell is still painful. Not a bother on a daily basis, but feels like a knot is still there and swollen.     Objective   Vital Signs:     /80   Pulse 66   Temp 96.8 °F (36 °C)   Resp 16   Ht 193 cm (76\")   Wt 116 kg (255 lb)   SpO2 98%   BMI 31.04 kg/m²     Physical Exam  Vitals and nursing note reviewed.   Constitutional:       Appearance: Normal appearance.   HENT:      Head: Normocephalic and atraumatic.      Nose: Congestion present.      Mouth/Throat:      Pharynx: Posterior oropharyngeal erythema present. No oropharyngeal exudate.      Comments: + cobblestoning  Eyes:      General:         Right eye: No discharge.         Left eye: No discharge.      Conjunctiva/sclera: Conjunctivae normal.   Cardiovascular:      Rate and Rhythm: Normal rate.      Pulses: Normal pulses.   Pulmonary:      Effort: Pulmonary effort is normal. No respiratory distress.   Musculoskeletal:      Comments: Full active and passive ROM but with some pain with behind hte back reaching and crossbody test, positive empty can test, neg drop arm test   Neurological:      Mental Status: He is alert.          Result Review :   The following data was reviewed by: Alexa Vega MD on 03/08/2022:    Data reviewed: Radiologic studies XR and MRI            Assessment and Plan  "     Diagnoses and all orders for this visit:    1. Traumatic incomplete tear of right rotator cuff, subsequent encounter (Primary)  -     Ambulatory Referral to Orthopedic Surgery    2. Postnasal drip    - OTC nasal corticosteroid recommended nightly    Follow Up   Return if symptoms worsen or fail to improve.    Patient was given instructions and counseling regarding his condition or for health maintenance advice. Please see specific information pulled into the AVS if appropriate.     Josie Vega MD  [facility] [place] [facility] [place] [facility]  Phone: [phone]  Fax: [phone]

## 2022-03-17 ENCOUNTER — TELEPHONE (OUTPATIENT)
Dept: CARDIOLOGY | Facility: CLINIC | Age: 52
End: 2022-03-17

## 2022-03-17 NOTE — TELEPHONE ENCOUNTER
Yes he could still take it on a as needed basis as he is doing the Pradaxa now.  It probably would be easiest to use Xarelto once a day as Eliquis is a twice a day and warfarin would not provide benefit on an as-needed basis.

## 2022-03-17 NOTE — TELEPHONE ENCOUNTER
Pt returns call, understands information and is agreeable to switch to xarelto. Please send in a new Rx for the Pt.

## 2022-03-17 NOTE — TELEPHONE ENCOUNTER
Spoke to Pt, office received PA request for Pradaxa, Pt's insurance does not approve this medication and gives several alternatives that are approved.    Warfarin  Eliquis  Xarelto  Savaysa    Pt reports that when he was first seen for PAF, he was given samples of Pradaxa to take only when episodes occurred of Afib, he has never taken this medication daily or regularly. Dr. Kelly is aware and comfortable with his method of medication adherence. Pt has no known allergies to any medications, he is agreeable to switch to an insurance approved medication as long as he can continue with the same PRN dosing of the anti-coagulant that he currently uses.    Recommendations?

## 2022-03-18 ENCOUNTER — HOSPITAL ENCOUNTER (OUTPATIENT)
Dept: CARDIOLOGY | Facility: HOSPITAL | Age: 52
Discharge: HOME | End: 2022-03-18

## 2022-03-18 ENCOUNTER — HOSPITAL ENCOUNTER (OUTPATIENT)
Dept: CARDIOLOGY | Facility: HOSPITAL | Age: 52
Setting detail: HOSPITAL OUTPATIENT SURGERY
Discharge: HOME | End: 2022-03-18

## 2022-03-18 VITALS
SYSTOLIC BLOOD PRESSURE: 112 MMHG | DIASTOLIC BLOOD PRESSURE: 72 MMHG | HEIGHT: 76 IN | WEIGHT: 265 LBS | BODY MASS INDEX: 32.27 KG/M2

## 2022-03-18 DIAGNOSIS — I48.0 PAF (PAROXYSMAL ATRIAL FIBRILLATION): ICD-10-CM

## 2022-03-18 LAB
AORTIC DIMENSIONLESS INDEX: 0.8 (DI)
BH CV ECHO MEAS - ACS: 2.8 CM
BH CV ECHO MEAS - AO MAX PG: 4 MMHG
BH CV ECHO MEAS - AO MEAN PG: 2.05 MMHG
BH CV ECHO MEAS - AO ROOT DIAM: 3.9 CM
BH CV ECHO MEAS - AO V2 MAX: 98 CM/SEC
BH CV ECHO MEAS - AO V2 VTI: 22.4 CM
BH CV ECHO MEAS - AVA(I,D): 3.6 CM2
BH CV ECHO MEAS - EDV(CUBED): 173.1 ML
BH CV ECHO MEAS - EDV(MOD-SP2): 154 ML
BH CV ECHO MEAS - EDV(MOD-SP4): 197 ML
BH CV ECHO MEAS - EF(MOD-BP): 56.5 %
BH CV ECHO MEAS - EF(MOD-SP2): 55.3 %
BH CV ECHO MEAS - EF(MOD-SP4): 58.4 %
BH CV ECHO MEAS - ESV(CUBED): 58 ML
BH CV ECHO MEAS - ESV(MOD-SP2): 68.8 ML
BH CV ECHO MEAS - ESV(MOD-SP4): 82 ML
BH CV ECHO MEAS - FS: 30.5 %
BH CV ECHO MEAS - IVS/LVPW: 0.84 CM
BH CV ECHO MEAS - IVSD: 0.99 CM
BH CV ECHO MEAS - LAT PEAK E' VEL: 14.4 CM/SEC
BH CV ECHO MEAS - LV MASS(C)D: 244.1 GRAMS
BH CV ECHO MEAS - LV MAX PG: 2.5 MMHG
BH CV ECHO MEAS - LV MEAN PG: 1.32 MMHG
BH CV ECHO MEAS - LV V1 MAX: 78 CM/SEC
BH CV ECHO MEAS - LV V1 VTI: 19.2 CM
BH CV ECHO MEAS - LVIDD: 5.6 CM
BH CV ECHO MEAS - LVIDS: 3.9 CM
BH CV ECHO MEAS - LVOT AREA: 4.2 CM2
BH CV ECHO MEAS - LVOT DIAM: 2.31 CM
BH CV ECHO MEAS - LVPWD: 1.19 CM
BH CV ECHO MEAS - MED PEAK E' VEL: 10.7 CM/SEC
BH CV ECHO MEAS - MR MAX PG: 14 MMHG
BH CV ECHO MEAS - MR MAX VEL: 186.8 CM/SEC
BH CV ECHO MEAS - MV A MAX VEL: 44.6 CM/SEC
BH CV ECHO MEAS - MV DEC SLOPE: 260.1 CM/SEC2
BH CV ECHO MEAS - MV DEC TIME: 0.26 MSEC
BH CV ECHO MEAS - MV E MAX VEL: 93.4 CM/SEC
BH CV ECHO MEAS - MV E/A: 2.1
BH CV ECHO MEAS - MV MEAN PG: 0.76 MMHG
BH CV ECHO MEAS - MV V2 VTI: 27.9 CM
BH CV ECHO MEAS - MVA(VTI): 2.9 CM2
BH CV ECHO MEAS - PA V2 MAX: 92.3 CM/SEC
BH CV ECHO MEAS - PI END-D VEL: 107.2 CM/SEC
BH CV ECHO MEAS - RAP SYSTOLE: 3 MMHG
BH CV ECHO MEAS - RV V1 VTI: 13.1 CM
BH CV ECHO MEAS - RVOT DIAM: 2.01 CM
BH CV ECHO MEAS - RVSP: 17.9 MMHG
BH CV ECHO MEAS - SV(LVOT): 80.5 ML
BH CV ECHO MEAS - SV(MOD-SP2): 85.2 ML
BH CV ECHO MEAS - SV(MOD-SP4): 115 ML
BH CV ECHO MEAS - SV(RVOT): 41.4 ML
BH CV ECHO MEAS - TAPSE (>1.6): 2.19 CM
BH CV ECHO MEAS - TR MAX PG: 14.9 MMHG
BH CV ECHO MEAS - TR MAX VEL: 193.3 CM/SEC
BH CV ECHO MEASUREMENTS AVERAGE E/E' RATIO: 7.44
BH CV XLRA - RV BASE: 3.9 CM
BH CV XLRA - TDI S': 11.2 CM/SEC
LEFT ATRIUM VOLUME INDEX: 28 ML/M2
MAXIMAL PREDICTED HEART RATE: 169 BPM
SINUS: 3.9 CM
STRESS TARGET HR: 144 BPM

## 2022-03-18 PROCEDURE — 93225 XTRNL ECG REC<48 HRS REC: CPT

## 2022-03-18 PROCEDURE — 93306 TTE W/DOPPLER COMPLETE: CPT

## 2022-03-18 PROCEDURE — 93306 TTE W/DOPPLER COMPLETE: CPT | Performed by: INTERNAL MEDICINE

## 2022-03-18 PROCEDURE — 93226 XTRNL ECG REC<48 HR SCAN A/R: CPT

## 2022-03-21 ENCOUNTER — TELEPHONE (OUTPATIENT)
Dept: CARDIOLOGY | Facility: CLINIC | Age: 52
End: 2022-03-21

## 2022-03-21 NOTE — TELEPHONE ENCOUNTER
----- Message from DIONICIO Rosenthal sent at 3/21/2022  8:54 AM EDT -----  Please call, normal.  Await monitor results

## 2022-03-21 NOTE — TELEPHONE ENCOUNTER
Attempted to call patient. No answer and no option to leave a VM. Will continue to try to reach him.    Thank you,    Juliette Almanza, RN  Triage [facility] [place]

## 2022-03-21 NOTE — TELEPHONE ENCOUNTER
Notified patient of results. He verbalized understanding.    Juliette Almanza, RN  Triage [facility] [place]

## 2022-03-22 LAB
MAXIMAL PREDICTED HEART RATE: 169 BPM
STRESS TARGET HR: 144 BPM

## 2022-03-22 PROCEDURE — 93227 XTRNL ECG REC<48 HR R&I: CPT | Performed by: INTERNAL MEDICINE

## 2022-03-23 DIAGNOSIS — R94.31 ABNORMAL HOLTER EXAM: Primary | ICD-10-CM

## 2022-03-23 DIAGNOSIS — I47.29 NSVT (NONSUSTAINED VENTRICULAR TACHYCARDIA): ICD-10-CM

## 2022-03-23 DIAGNOSIS — I48.0 PAF (PAROXYSMAL ATRIAL FIBRILLATION): ICD-10-CM

## 2022-03-23 NOTE — PROGRESS NOTES
Spoke to patient with results.  I told him that I had spoken to Dr. Kelly and because he is having episodic nonsustained V. tach, and possible atrial flutter, he would like him to see EP.  Patient was agreeable to plan of care.  I have made that referral.  For now we will keep him on his pill in the pocket but I did discuss with him that may not be the end result.

## 2022-04-11 ENCOUNTER — OFFICE VISIT (OUTPATIENT)
Dept: ORTHOPEDIC SURGERY | Facility: CLINIC | Age: 52
End: 2022-04-11

## 2022-04-11 VITALS
HEART RATE: 65 BPM | WEIGHT: 265 LBS | SYSTOLIC BLOOD PRESSURE: 101 MMHG | BODY MASS INDEX: 32.27 KG/M2 | DIASTOLIC BLOOD PRESSURE: 67 MMHG | HEIGHT: 76 IN

## 2022-04-11 DIAGNOSIS — M75.81 TENDINITIS OF RIGHT ROTATOR CUFF: ICD-10-CM

## 2022-04-11 DIAGNOSIS — M75.41 SUBACROMIAL IMPINGEMENT OF RIGHT SHOULDER: ICD-10-CM

## 2022-04-11 DIAGNOSIS — M75.21 BICEPS TENDINITIS OF RIGHT UPPER EXTREMITY: ICD-10-CM

## 2022-04-11 DIAGNOSIS — S46.011A TRAUMATIC INCOMPLETE TEAR OF RIGHT ROTATOR CUFF, INITIAL ENCOUNTER: Primary | ICD-10-CM

## 2022-04-11 PROCEDURE — 73030 X-RAY EXAM OF SHOULDER: CPT | Performed by: ORTHOPAEDIC SURGERY

## 2022-04-11 PROCEDURE — 99214 OFFICE O/P EST MOD 30 MIN: CPT | Performed by: ORTHOPAEDIC SURGERY

## 2022-04-11 NOTE — PROGRESS NOTES
Subjective:     Patient ID: Alli Bello is a 52 y.o. male.    Chief Complaint:  Right shoulder pain, new issue  History of Present Illness  Alli Bello presents to clinic today for evaluation of right shoulder pain.    The patient reports the onset of his pain began in 09/2021. He describes his pain as mild in intensity, and rates his pain 5 out of 10. He reports that he feels like his right shoulder feels week but when he moves it a certain way he describes his pain as shooting in nature. His pain is exacerbated with overhead lifting. The patient denies pain that disrupts his sleep. He denies taking any medication for pain. He is right hand dominate.    The patient reports a history of atrial fibrillation, and ventricular tachycardia. He denies taking any blood thinners. He reports a history of blood clots in his right calf.       Social History     Occupational History   • Occupation:    Tobacco Use   • Smoking status: Never Smoker   • Smokeless tobacco: Never Used   • Tobacco comment: caffine use   Vaping Use   • Vaping Use: Never used   Substance and Sexual Activity   • Alcohol use: Yes     Alcohol/week: 5.0 - 6.0 standard drinks     Types: 5 - 6 Standard drinks or equivalent per week     Comment: 2-3 nights/week   • Drug use: No     Comment: Reports drinking one caffeinated beverage per day   • Sexual activity: Yes      Past Medical History:   Diagnosis Date   • Achilles tendon injury     LEFT, SCHEDULED FOR REPAIR   • Anxiety attack    • Aortic stenosis    • Atrial fibrillation (HCC)    • Deep venous thrombosis of calf (HCC)     right   • THOMAS (dyspnea on exertion)     since fib started   • Fatigue     mild   • Lightheaded    • Malaise and fatigue    • PAC (premature atrial contraction)     very rare   • PAF (paroxysmal atrial fibrillation) ([facility])    • Palpitations     gets lightheaded with it   • Pulmonary nodule    • Sleep apnea     TO BRING DOS, CPAP   • Snoring     no testing   • SOB  "(shortness of breath)     mildly   • Syncope     ONLY WITH A-FIB   • Syncope and collapse      Past Surgical History:   Procedure Laterality Date   • ACHILLES TENDON SURGERY Left 3/27/2019    Procedure: ACHILLES TENDON REPAIR;  Surgeon: Angel Baez MD;  Location: [facility];  Service: Orthopedics   • CARDIOVERSION  1998    D/T A FIB   • NO PAST SURGERIES         Family History   Problem Relation Age of Onset   • Heart disease Father    • Hypertension Father    • Coronary artery disease Father         CAD/CHD <61yo   • Hyperlipidemia Father    • Heart attack Father    • Stroke Paternal Grandmother    • Arrhythmia Mother    • Atrial fibrillation Mother    • Arrhythmia Brother    • Atrial fibrillation Brother          Review of Systems        Objective:  Vitals:    04/11/22 1505   BP: 101/67   BP Location: Left arm   Pulse: 65   Weight: 120 kg (265 lb)   Height: 193 cm (76\")         04/11/22  1505   Weight: 120 kg (265 lb)     Body mass index is 32.26 kg/m².  Physical Exam    Vital signs reviewed.   General: No acute distress, alert and oriented  Eyes: conjunctiva clear; pupils equally round and reactive  ENT: external ears and nose atraumatic; oropharynx clear  CV: no peripheral edema  Resp: normal respiratory effort  Skin: no rashes or wounds; normal turgor  Psych: mood and affect appropriate; recent and remote memory intact          Ortho Exam     Right shoulder-    FF- Active- 175 degrees  Strength- 4+/5  ER- Active- 55 degrees  Strength- 4+/5  Belly press test strength- 5/5  Bear hug sign- Negative  Empty can test- Positive  Drop arm test- Negative  Ext rotation lag sign- Negative  Neer's sign- Mildly positive  Lozano- Mildly positive  [place]'s- Positive  Brisk cap refill to all digits, palpable 2+ radial pulse  Positive sensation to light touch palmar, dorsal aspects of small and index fingers and anatomic snuffbox right hand, symmetric to the left.  Positive deltoid firing all three " components.    Imaging:  Right Shoulder X-Ray  Indication: Pain  AP, scapular Y, and axillary lateral views    Findings:  No fracture  No bony lesion  Normal soft tissues  Moderate humeral head elevation noted  No prior studies were available for comparison.              Review outside MRI right shoulder including review of images as well as radiology report indicates high-grade partial-thickness articular sided tear of supraspinatus with tear of the upper border the subscapularis and mild to moderate medial subluxation of the biceps tendon from the bicipital groove.    Assessment:        1. Traumatic incomplete tear of right rotator cuff, initial encounter    2. Subacromial impingement of right shoulder    3. Tendinitis of right rotator cuff    4. Biceps tendinitis of right upper extremity           Plan:          1. Discussed treatment options at length with patient at today's visit.   2. At this point in time, given his persistence of pain as well as failure of other conservative treatments including anti-inflammatory medications over the counter intermittently as well as persistence of pain with lifting and activities of daily living, he would like to proceed with surgical treatment at this time.  3. Plan will be for right shoulder arthroscopy, rotator cuff debridement versus repair, possible biceps tenodesis, subacromial decompression, and all associated procedures.  I reviewed risks benefits and alternatives the procedure with risks including not limited to neurovascular damage, bleeding, infection, chronic pain, re-tear rotator cuff, failure of healing rotator cuff, loss of motion, weakness, stiffness, instability, biceps sag, DVT, pulmonary embolus, death, stroke, complex regional pain syndrome, myocardial infarction, need for additional procedures. [unfilled] understood all these had all questions answered.  Patient verbally consented to proceed with surgery.  No guarantees were given regarding results of  surgery.  We will have patient medically optimized by primary care physician and proceed with surgery at next available date.  4. The patient has a history of atrial fibrillation and ventral tachycardia. He is currently being followed by his cardiologist, Dr. Kelly, for this issue as well a an electrophysiologist.   5. The patient also has a history of blood clots in his right calf from a torn calf muscle.  6. We will contact patient once we receive approval from his cardiologist to proceed with surgery.      Alil SHRUTHI Bello was in agreement with plan and had all questions answered.     Orders:  Orders Placed This Encounter   Procedures   • XR Shoulder 2+ View Right       Medications:  No orders of the defined types were placed in this encounter.      Followup:  No follow-ups on file.    Diagnoses and all orders for this visit:    1. Traumatic incomplete tear of right rotator cuff, initial encounter (Primary)  -     XR Shoulder 2+ View Right    2. Subacromial impingement of right shoulder    3. Tendinitis of right rotator cuff    4. Biceps tendinitis of right upper extremity      Transcribed from ambient dictation for Angel Baez MD by Zully Lim  04/11/22   18:20 EDT    Patient verbalized consent to the visit recording.        Dictated utilizing Dragon dictation

## 2022-04-14 ENCOUNTER — OFFICE VISIT (OUTPATIENT)
Dept: CARDIOLOGY | Facility: CLINIC | Age: 52
End: 2022-04-14

## 2022-04-14 VITALS
SYSTOLIC BLOOD PRESSURE: 102 MMHG | HEIGHT: 76 IN | HEART RATE: 63 BPM | DIASTOLIC BLOOD PRESSURE: 68 MMHG | WEIGHT: 248 LBS | BODY MASS INDEX: 30.2 KG/M2

## 2022-04-14 DIAGNOSIS — I48.91 ATRIAL FIBRILLATION WITH RVR: Primary | ICD-10-CM

## 2022-04-14 PROCEDURE — 93000 ELECTROCARDIOGRAM COMPLETE: CPT | Performed by: INTERNAL MEDICINE

## 2022-04-14 PROCEDURE — 99214 OFFICE O/P EST MOD 30 MIN: CPT | Performed by: INTERNAL MEDICINE

## 2022-04-14 RX ORDER — NEBIVOLOL 10 MG/1
10 TABLET ORAL DAILY PRN
Status: ON HOLD | COMMUNITY
End: 2022-10-24

## 2022-04-14 RX ORDER — NEBIVOLOL 5 MG/1
5 TABLET ORAL DAILY
Qty: 30 TABLET | Refills: 3 | Status: SHIPPED | OUTPATIENT
Start: 2022-04-14 | End: 2022-04-18

## 2022-04-14 RX ORDER — FLECAINIDE ACETATE 50 MG/1
50 TABLET ORAL 2 TIMES DAILY
Qty: 180 TABLET | Refills: 3 | Status: SHIPPED | OUTPATIENT
Start: 2022-04-14 | End: 2022-09-23 | Stop reason: DRUGHIGH

## 2022-04-14 NOTE — PROGRESS NOTES
Date of Office Visit: 2022  Encounter Provider: Vic Bradley MD  Place of Service: [facility] CARDIOLOGY  Patient Name: Alli Bello  :1970    Chief Complaint   Patient presents with   • NSVT     New Patient Consult - Dr. Kelly pt   • paroxysmal AFIB   :     HPI: Alli Bello is a 52 y.o. male who presents today for paroxysmal atrial fibrillation.     The patient has a long history of PAF going back several years.     Episodes typically last several hours to days.      Episodes have typically occurred annually, but recent have increased in frequency.     In the past he has been managed with a pill in pocket approach.      He is taking anticoagulation for a month after he has a symptomatic episode of atrial fibrillation.         Past Medical History:   Diagnosis Date   • Achilles tendon injury     LEFT, SCHEDULED FOR REPAIR   • Anxiety attack    • Aortic stenosis    • Atrial fibrillation (HCC)    • Deep venous thrombosis of calf (HCC)     right   • THOMAS (dyspnea on exertion)     since fib started   • Fatigue     mild   • Lightheaded    • Malaise and fatigue    • PAC (premature atrial contraction)     very rare   • PAF (paroxysmal atrial fibrillation) (HCC)    • Palpitations     gets lightheaded with it   • Pulmonary nodule    • Sleep apnea     TO BRING DOS, CPAP   • Snoring     no testing   • SOB (shortness of breath)     mildly   • Syncope     ONLY WITH A-FIB   • Syncope and collapse        Past Surgical History:   Procedure Laterality Date   • ACHILLES TENDON SURGERY Left 3/27/2019    Procedure: ACHILLES TENDON REPAIR;  Surgeon: Angel Baez MD;  Location: [facility];  Service: Orthopedics   • CARDIOVERSION      D/T A FIB   • NO PAST SURGERIES         Social History     Socioeconomic History   • Marital status:    Tobacco Use   • Smoking status: Never Smoker   • Smokeless tobacco: Never Used   • Tobacco comment: caffine use   Vaping Use   • Vaping  "Use: Never used   Substance and Sexual Activity   • Alcohol use: Yes     Alcohol/week: 5.0 - 6.0 standard drinks     Types: 5 - 6 Standard drinks or equivalent per week     Comment: 2-3 nights/week   • Drug use: No     Comment: Reports drinking one caffeinated beverage per day   • Sexual activity: Yes       Family History   Problem Relation Age of Onset   • Heart disease Father    • Hypertension Father    • Coronary artery disease Father         CAD/CHD <59yo   • Hyperlipidemia Father    • Heart attack Father    • Stroke Paternal Grandmother    • Arrhythmia Mother    • Atrial fibrillation Mother    • Arrhythmia Brother    • Atrial fibrillation Brother        Review of Systems   Constitutional: Negative.   Cardiovascular: Positive for palpitations.   Respiratory: Negative.    Gastrointestinal: Negative.        No Known Allergies      Current Outpatient Medications:   •  citalopram (CeleXA) 20 MG tablet, Take 1 tablet by mouth Every Morning., Disp: 90 tablet, Rfl: 1  •  flecainide (TAMBOCOR) 100 MG tablet, Take 450 mg by mouth Every Other Day As Needed (IF NEEDED TO TAKE 2 DOSES, REPORT TO CARDIOLOGIST, PRN FOR A-FIB)., Disp: , Rfl:   •  LORazepam (ATIVAN) 0.5 MG tablet, Take 0.5 tablets by mouth Every 8 (Eight) Hours As Needed for Anxiety., Disp: 30 tablet, Rfl: 0  •  nebivolol (BYSTOLIC) 10 MG tablet, Take 10 mg by mouth Daily As Needed (afib)., Disp: , Rfl:   •  rivaroxaban (Xarelto) 20 MG tablet, Take 1 tablet by mouth Daily. (Patient taking differently: Take 20 mg by mouth Daily. PRN ONLY), Disp: 30 tablet, Rfl: 2  •  flecainide (TAMBOCOR) 50 MG tablet, Take 1 tablet by mouth 2 (Two) Times a Day., Disp: 180 tablet, Rfl: 3  •  nebivolol (Bystolic) 5 MG tablet, Take 1 tablet by mouth Daily., Disp: 30 tablet, Rfl: 3      Objective:     Vitals:    04/14/22 1151   BP: 102/68   Pulse: 63   Weight: 112 kg (248 lb)   Height: 193 cm (76\")     Body mass index is 30.19 kg/m².    PHYSICAL EXAM:    Vitals and nursing note " reviewed.   Constitutional:       Appearance: Normal and healthy appearance.   HENT:    Mouth/Throat:      Pharynx: Oropharynx is clear.   Pulmonary:      Effort: Pulmonary effort is normal.   Cardiovascular:      Normal rate. Regular rhythm.      Murmurs: There is no murmur.   Edema:     Peripheral edema absent.   Skin:     General: Skin is warm.   Neurological:      General: No focal deficit present.      Mental Status: Alert, oriented to person, place, and time and oriented to person, place and time.   Psychiatric:         Attention and Perception: Attention and perception normal.         Mood and Affect: Mood and affect normal.         Behavior: Behavior is cooperative.             ECG 12 Lead    Date/Time: 4/14/2022 10:27 PM  Performed by: Vic Bradley MD  Authorized by: Vic Bradley MD   Comparison: compared with previous ECG from 3/8/2022  Rhythm: sinus rhythm              Assessment:       Diagnosis Plan   1. Atrial fibrillation with RVR ([facility])  ECG 12 Lead          Plan:       Discussed option for rhythm control with ablation vs continuous antiarrhythmic therapy.  He does not wish to pursue ablation at this time.  He will start flecainide 50 mg BID and bystolic.  His CHADS2-VASC score is 0.  We discussed and he wishes to continue taking anticoagulation when he feels symptomatic atrial fibrillation.  We discussed his risk of stroke.     As always, it has been a pleasure to participate in your patient's care.      Sincerely,         Vci Bradley MD

## 2022-04-15 ENCOUNTER — TELEPHONE (OUTPATIENT)
Dept: CARDIOLOGY | Facility: CLINIC | Age: 52
End: 2022-04-15

## 2022-04-15 DIAGNOSIS — I48.0 PAF (PAROXYSMAL ATRIAL FIBRILLATION): Primary | ICD-10-CM

## 2022-04-15 NOTE — TELEPHONE ENCOUNTER
Patient insurance will not cover nebivilol.    Preferred alternatives are atenolol, metoprolol or carvedilol.    Please advise.  Thanks!

## 2022-04-18 RX ORDER — METOPROLOL SUCCINATE 25 MG/1
50 TABLET, EXTENDED RELEASE ORAL DAILY
Qty: 60 TABLET | Refills: 5 | Status: SHIPPED | OUTPATIENT
Start: 2022-04-18 | End: 2022-09-23 | Stop reason: SDUPTHER

## 2022-04-25 ENCOUNTER — HOSPITAL ENCOUNTER (OUTPATIENT)
Dept: CARDIOLOGY | Facility: HOSPITAL | Age: 52
Setting detail: HOSPITAL OUTPATIENT SURGERY
Discharge: HOME | End: 2022-04-25
Admitting: INTERNAL MEDICINE

## 2022-04-25 DIAGNOSIS — I48.91 ATRIAL FIBRILLATION WITH RVR: ICD-10-CM

## 2022-04-25 LAB — QT INTERVAL: 407 MS

## 2022-04-25 PROCEDURE — 93010 ELECTROCARDIOGRAM REPORT: CPT | Performed by: INTERNAL MEDICINE

## 2022-04-25 PROCEDURE — 93005 ELECTROCARDIOGRAM TRACING: CPT | Performed by: INTERNAL MEDICINE

## 2022-04-26 RX ORDER — CEFAZOLIN SODIUM 2 G/100ML
2 INJECTION, SOLUTION INTRAVENOUS ONCE
Status: CANCELLED | OUTPATIENT
Start: 2022-10-24 | End: 2022-04-26

## 2022-04-26 RX ORDER — MELOXICAM 15 MG/1
15 TABLET ORAL ONCE
Status: CANCELLED | OUTPATIENT
Start: 2022-10-24 | End: 2022-04-26

## 2022-04-26 RX ORDER — ACETAMINOPHEN 325 MG/1
1000 TABLET ORAL ONCE
Status: CANCELLED | OUTPATIENT
Start: 2022-10-24 | End: 2022-04-26

## 2022-04-26 RX ORDER — PREGABALIN 75 MG/1
150 CAPSULE ORAL ONCE
Status: CANCELLED | OUTPATIENT
Start: 2022-10-24 | End: 2022-04-26

## 2022-04-28 ENCOUNTER — OFFICE VISIT (OUTPATIENT)
Dept: INTERNAL MEDICINE | Facility: CLINIC | Age: 52
End: 2022-04-28

## 2022-04-28 VITALS
HEIGHT: 76 IN | BODY MASS INDEX: 30.19 KG/M2 | OXYGEN SATURATION: 95 % | TEMPERATURE: 97.1 F | DIASTOLIC BLOOD PRESSURE: 74 MMHG | SYSTOLIC BLOOD PRESSURE: 104 MMHG | RESPIRATION RATE: 16 BRPM | HEART RATE: 76 BPM

## 2022-04-28 DIAGNOSIS — F41.9 ANXIETY: Chronic | ICD-10-CM

## 2022-04-28 DIAGNOSIS — R68.83 CHILLS: ICD-10-CM

## 2022-04-28 DIAGNOSIS — R09.81 NASAL CONGESTION: ICD-10-CM

## 2022-04-28 DIAGNOSIS — J02.9 SORE THROAT: Primary | ICD-10-CM

## 2022-04-28 LAB
EXPIRATION DATE: NORMAL
EXPIRATION DATE: NORMAL
FLUAV AG UPPER RESP QL IA.RAPID: NOT DETECTED
FLUBV AG UPPER RESP QL IA.RAPID: NOT DETECTED
INTERNAL CONTROL: NORMAL
INTERNAL CONTROL: NORMAL
Lab: NORMAL
Lab: NORMAL
S PYO RRNA THROAT QL PROBE: NEGATIVE
SARS-COV-2 RNA RESP QL NAA+PROBE: NOT DETECTED

## 2022-04-28 PROCEDURE — 87428 SARSCOV & INF VIR A&B AG IA: CPT | Performed by: INTERNAL MEDICINE

## 2022-04-28 PROCEDURE — 87651 STREP A DNA AMP PROBE: CPT | Performed by: INTERNAL MEDICINE

## 2022-04-28 PROCEDURE — 99213 OFFICE O/P EST LOW 20 MIN: CPT | Performed by: INTERNAL MEDICINE

## 2022-04-28 RX ORDER — LORAZEPAM 0.5 MG/1
0.25 TABLET ORAL EVERY 8 HOURS PRN
Qty: 30 TABLET | Refills: 0 | Status: SHIPPED | OUTPATIENT
Start: 2022-04-28

## 2022-04-28 NOTE — PROGRESS NOTES
"Chief Complaint  Sore Throat, Chills (/), and Nasal Congestion    Subjective          Alli HAWKINS Bello presents to [facility] for chills, body aches, fatigue, headaches x 2-3 days. Occurred acutely and made him bed bound for 24+ hours. No fever. Daughter had similar infection 1-2 weeks ago. Mild cough but not productive.     Objective   Vital Signs:     /74   Pulse 76   Temp 97.1 °F (36.2 °C)   Resp 16   Ht 193 cm (76\")   SpO2 95%   BMI 30.19 kg/m²     Physical Exam  Vitals and nursing note reviewed.   Constitutional:       General: He is not in acute distress.     Appearance: Normal appearance.   HENT:      Head: Normocephalic and atraumatic.      Right Ear: Ear canal and external ear normal. No middle ear effusion.      Left Ear: Ear canal and external ear normal.  No middle ear effusion.      Ears:      Comments: Bilateral TMs pink but with good light reflex     Nose: Congestion present. No nasal deformity.      Right Turbinates: Swollen.      Left Turbinates: Swollen.      Comments: - sinus pressure     Mouth/Throat:      Mouth: Mucous membranes are moist.      Pharynx: Posterior oropharyngeal erythema present. No oropharyngeal exudate.      Tonsils: No tonsillar exudate. 2+ on the right. 2+ on the left.      Comments: + cobblestoning  Eyes:      General:         Right eye: No discharge.         Left eye: No discharge.      Extraocular Movements: Extraocular movements intact.      Conjunctiva/sclera: Conjunctivae normal.      Pupils: Pupils are equal, round, and reactive to light.   Cardiovascular:      Rate and Rhythm: Normal rate and regular rhythm.      Heart sounds: No murmur heard.  Pulmonary:      Effort: Pulmonary effort is normal. No respiratory distress.      Breath sounds: Normal breath sounds. No wheezing or rhonchi.   Abdominal:      General: Bowel sounds are normal. There is no distension.      Palpations: Abdomen is soft. There is no mass. "      Tenderness: There is no abdominal tenderness.   Musculoskeletal:         General: Normal range of motion.      Cervical back: Normal range of motion and neck supple.   Lymphadenopathy:      Cervical: Cervical adenopathy present.   Skin:     General: Skin is warm and dry.      Capillary Refill: Capillary refill takes less than 2 seconds.      Findings: No rash.   Neurological:      General: No focal deficit present.      Mental Status: He is alert and oriented to person, place, and time.   Psychiatric:         Mood and Affect: Mood normal.         Behavior: Behavior normal.          Result Review : : None    Lab Results   Component Value Date    RAPSCRN Negative 04/28/2022     Covid-19 + Flu A&B AG, Veritor (04/28/2022 15:29)       Assessment and Plan      Diagnoses and all orders for this visit:    1. Sore throat (Primary)  2. Chills  3. Nasal Congestion   - POCT negative including rapid Flu Ag, rapid COVID Ag, Strep PCR   - COVID PCR sent off for confirmatory test   - rec OTC treatment with nasal corticosteroid and decongestant   - antipyretics including tylenol and ibuprofen prn   - warned regarding secondary infection and when pt should call back to office for repeat evaluation     Orders:  -     POCT Strep A, molecular  -     Covid-19 + Flu A&B AG, Veritor        COVID PCR    Follow Up   Return if symptoms worsen or fail to improve.    Patient was given instructions and counseling regarding his condition or for health maintenance advice. Please see specific information pulled into the AVS if appropriate.     Josie Vega MD  [facility]  Phone: [phone]  Fax: [phone]

## 2022-04-29 LAB
LABCORP SARS-COV-2, NAA 2 DAY TAT: NORMAL
SARS-COV-2 RNA RESP QL NAA+PROBE: NOT DETECTED

## 2022-07-08 ENCOUNTER — OFFICE VISIT (OUTPATIENT)
Dept: CARDIOLOGY | Facility: CLINIC | Age: 52
End: 2022-07-08

## 2022-07-08 VITALS
HEART RATE: 54 BPM | SYSTOLIC BLOOD PRESSURE: 138 MMHG | BODY MASS INDEX: 30.93 KG/M2 | WEIGHT: 254 LBS | HEIGHT: 76 IN | DIASTOLIC BLOOD PRESSURE: 88 MMHG

## 2022-07-08 DIAGNOSIS — E66.9 OBESITY (BMI 30.0-34.9): ICD-10-CM

## 2022-07-08 DIAGNOSIS — I48.0 PAF (PAROXYSMAL ATRIAL FIBRILLATION): Primary | Chronic | ICD-10-CM

## 2022-07-08 DIAGNOSIS — Z99.89 OSA ON CPAP: Chronic | ICD-10-CM

## 2022-07-08 DIAGNOSIS — G47.33 OSA ON CPAP: Chronic | ICD-10-CM

## 2022-07-08 PROCEDURE — 93000 ELECTROCARDIOGRAM COMPLETE: CPT

## 2022-07-08 PROCEDURE — 99213 OFFICE O/P EST LOW 20 MIN: CPT

## 2022-07-08 NOTE — PROGRESS NOTES
Date of Office Visit: 2022  Encounter Provider: DIONICIO Reyes  Place of Service: [facility] CARDIOLOGY  Patient Name: Alli Bello  :1970    Chief Complaint   Patient presents with   • AFIB w/RVR     3 month f/u   :     HPI: Alli Bello is a 52 y.o. male who is a patient of Dr. Kelly, referred to Dr. Bradley for paroxsymal atrial fibrillation.  He has a history of DVT, MIKA, PAC, and PAF.     He saw Dr. Bradley for the first time on 2022.  He has a long history of PAF going back several years.  His episodes used to occur annually but had recently increased in frequency.  These episodes usually last several hours to days.  In the past he was managed with pill in pocket method. Dr. Bradley discussed rhythm control; ablation vs. Continuous antiarrhythmic therapy.  He did not wish to pursue ablation and elected to start flecainide 50mg BID.  He wished to continue taking AC only for symptomatic episodes.                               He presents today for follow up appt.      He says that he has done well since starting flecainide daily, was previously PRN.     He has had 2-3 episodes since March but these were soon after starting the flecainide.  His last episode was 4-6 weeks ago and was short in duration.     He denies any chest pain, dyspnea, fatigue, or syncope.     EKG today shows NSR. QRS looks good.     He is tolerating the flecainide well, was switched to metoprolol instead of bystolic.    We discussed alcohol intake and correlation with AF. He is not a daily drinker, just occasional.       Past Medical History:   Diagnosis Date   • Achilles tendon injury     LEFT, SCHEDULED FOR REPAIR   • Anxiety attack    • Aortic stenosis    • Atrial fibrillation (HCC)    • Deep venous thrombosis of calf (HCC)     right   • THOMAS (dyspnea on exertion)     since fib started   • Fatigue     mild   • Lightheaded    • Malaise and fatigue    • PAC (premature atrial  contraction)     very rare   • PAF (paroxysmal atrial fibrillation) ([facility])    • Palpitations     gets lightheaded with it   • Pulmonary nodule    • Sleep apnea     TO BRING DOS, CPAP   • Snoring     no testing   • SOB (shortness of breath)     mildly   • Syncope     ONLY WITH A-FIB   • Syncope and collapse        Past Surgical History:   Procedure Laterality Date   • ACHILLES TENDON SURGERY Left 3/27/2019    Procedure: ACHILLES TENDON REPAIR;  Surgeon: Angel Baez MD;  Location: [facility];  Service: Orthopedics   • CARDIOVERSION  1998    D/T A FIB   • NO PAST SURGERIES         Social History     Socioeconomic History   • Marital status:    Tobacco Use   • Smoking status: Never Smoker   • Smokeless tobacco: Never Used   • Tobacco comment: caffine use   Vaping Use   • Vaping Use: Never used   Substance and Sexual Activity   • Alcohol use: Yes     Alcohol/week: 5.0 - 6.0 standard drinks     Types: 5 - 6 Standard drinks or equivalent per week     Comment: 2-3 nights/week   • Drug use: No     Comment: Reports drinking one caffeinated beverage per day   • Sexual activity: Yes       Family History   Problem Relation Age of Onset   • Heart disease Father    • Hypertension Father    • Coronary artery disease Father         CAD/CHD <61yo   • Hyperlipidemia Father    • Heart attack Father    • Stroke Paternal Grandmother    • Arrhythmia Mother    • Atrial fibrillation Mother    • Arrhythmia Brother    • Atrial fibrillation Brother        Review of Systems   Constitutional: Negative for chills, fever and malaise/fatigue.   Cardiovascular: Negative for chest pain, dyspnea on exertion, leg swelling, near-syncope, orthopnea, palpitations, paroxysmal nocturnal dyspnea and syncope.   Respiratory: Negative for cough and shortness of breath.    Hematologic/Lymphatic: Negative.    Musculoskeletal: Negative for joint pain, joint swelling and myalgias.   Gastrointestinal: Negative for abdominal pain, diarrhea, melena,  "nausea and vomiting.   Genitourinary: Negative for frequency and hematuria.   Neurological: Negative for light-headedness, numbness, paresthesias and seizures.   Allergic/Immunologic: Negative.    All other systems reviewed and are negative.      No Known Allergies      Current Outpatient Medications:   •  citalopram (CeleXA) 20 MG tablet, Take 1 tablet by mouth Every Morning., Disp: 90 tablet, Rfl: 1  •  flecainide (TAMBOCOR) 100 MG tablet, Take 450 mg by mouth Every Other Day As Needed (IF NEEDED TO TAKE 2 DOSES, REPORT TO CARDIOLOGIST, PRN FOR A-FIB). PRN ONLY, Disp: , Rfl:   •  flecainide (TAMBOCOR) 50 MG tablet, Take 1 tablet by mouth 2 (Two) Times a Day., Disp: 180 tablet, Rfl: 3  •  LORazepam (ATIVAN) 0.5 MG tablet, Take 0.5 tablets by mouth Every 8 (Eight) Hours As Needed for Anxiety. (Patient taking differently: Take 0.25 mg by mouth Every 8 (Eight) Hours As Needed for Anxiety. PRN ONLY), Disp: 30 tablet, Rfl: 0  •  metoprolol succinate XL (TOPROL-XL) 25 MG 24 hr tablet, Take 2 tablets by mouth Daily., Disp: 60 tablet, Rfl: 5  •  nebivolol (BYSTOLIC) 10 MG tablet, Take 10 mg by mouth Daily As Needed (afib)., Disp: , Rfl:   •  rivaroxaban (Xarelto) 20 MG tablet, Take 1 tablet by mouth Daily. (Patient taking differently: Take 20 mg by mouth Daily. PRN ONLY), Disp: 30 tablet, Rfl: 2      Objective:     Vitals:    07/08/22 1400   BP: 138/88   Pulse: 54   Weight: 115 kg (254 lb)   Height: 193 cm (76\")     Body mass index is 30.92 kg/m².    PHYSICAL EXAM:    Vitals Reviewed.   General Appearance: No acute distress, well developed and well nourished.   Eyes: Conjunctiva and lids: No erythema, swelling, or discharge. Sclera non-icteric.   HENT: Atraumatic, normocephalic. External eyes, ears, and nose normal.   Respiratory: No signs of respiratory distress. Respiration rhythm and depth normal.   Clear to auscultation. No rales, crackles, rhonchi, or wheezing auscultated.   Cardiovascular:  Heart Rate and Rhythm: " Normal, Heart Sounds: Normal S1 and S2. No S3 or S4 noted.  Gastrointestinal:  Abdomen soft, non-distended, non-tender.   Musculoskeletal: Normal movement of extremities  Skin: Warm and dry.   Psychiatric: Patient alert and oriented to person, place, and time. Speech and behavior appropriate. Normal mood and affect.       ECG 12 Lead    Date/Time: 7/8/2022 2:26 PM  Performed by: Beni Davey APRN  Authorized by: Beni Davey APRN   Comparison: compared with previous ECG   Similar to previous ECG  Rhythm: sinus rhythm  BPM: 54                Assessment:       Diagnosis Plan   1. PAF (paroxysmal atrial fibrillation) ([facility])     2. MIKA on CPAP     3. Obesity (BMI 30.0-34.9)            Plan:   1. PAF-- well controlled on flecainide 50mg BID.  He is tolerating it well with no complaints. QRS looks good today, no concerns. He has had 2-3 episodes since starting it but the most recent was 4 weeks ago.     [id number] is 0, he is taking rivaroxaban as needed for symptomatic episodes.    2. MIKA- CPAP compliant    3.obesity- we discussed the importance of exercise, dietary intake, and limiting alcohol consumption in aiding to control his AF.     We also briefly discussed ablation again.  I think if his episodes increase in frequency despite flecainide then we should really consider ablation as a next step, he is agreeable.       He will follow up in 6 months or sooner if he has issues.     As always, it has been a pleasure to participate in your patient's care.      Sincerely,         DIONICIO Reyes

## 2022-09-23 ENCOUNTER — TELEPHONE (OUTPATIENT)
Dept: CARDIOLOGY | Facility: CLINIC | Age: 52
End: 2022-09-23

## 2022-09-23 DIAGNOSIS — I48.0 PAF (PAROXYSMAL ATRIAL FIBRILLATION): Primary | ICD-10-CM

## 2022-09-23 DIAGNOSIS — I48.0 PAF (PAROXYSMAL ATRIAL FIBRILLATION): ICD-10-CM

## 2022-09-23 RX ORDER — FLECAINIDE ACETATE 50 MG/1
50 TABLET ORAL 2 TIMES DAILY
Qty: 180 TABLET | Refills: 1 | Status: SHIPPED | OUTPATIENT
Start: 2022-09-23 | End: 2022-12-01

## 2022-09-23 RX ORDER — METOPROLOL SUCCINATE 25 MG/1
50 TABLET, EXTENDED RELEASE ORAL DAILY
Qty: 180 TABLET | Refills: 1 | Status: SHIPPED | OUTPATIENT
Start: 2022-09-23

## 2022-09-23 NOTE — PROGRESS NOTES
Patient is having more episodes of AF refractory to flecainide. He says that most of these episodes are short but becoming very bothersome. He would like to pursue ablation.  He is going to start taking rivarxoaban daily.

## 2022-09-23 NOTE — TELEPHONE ENCOUNTER
Patient last seen by Beni 7/8, discussed ablation and wanted to try medication therapy first.  He has been having issues lately, and wants to discuss ablation.    He is not scheduled for f/u until January.    Does he need to make another appt to discuss or call?    He can be reached at [phone].

## 2022-09-27 ENCOUNTER — TRANSCRIBE ORDERS (OUTPATIENT)
Dept: CARDIOLOGY | Facility: CLINIC | Age: 52
End: 2022-09-27

## 2022-09-27 DIAGNOSIS — E66.9 OBESITY (BMI 30.0-34.9): Primary | ICD-10-CM

## 2022-09-27 DIAGNOSIS — Z01.810 PRE-OPERATIVE CARDIOVASCULAR EXAMINATION: Primary | ICD-10-CM

## 2022-09-27 DIAGNOSIS — Z13.6 SCREENING FOR ISCHEMIC HEART DISEASE: ICD-10-CM

## 2022-09-27 DIAGNOSIS — I48.0 PAF (PAROXYSMAL ATRIAL FIBRILLATION): ICD-10-CM

## 2022-10-21 ENCOUNTER — LAB (OUTPATIENT)
Dept: LAB | Facility: HOSPITAL | Age: 52
End: 2022-10-21

## 2022-10-21 DIAGNOSIS — Z01.810 PRE-OPERATIVE CARDIOVASCULAR EXAMINATION: ICD-10-CM

## 2022-10-21 DIAGNOSIS — Z13.6 SCREENING FOR ISCHEMIC HEART DISEASE: ICD-10-CM

## 2022-10-21 LAB
ANION GAP SERPL CALCULATED.3IONS-SCNC: 10.6 MMOL/L (ref 5–15)
BASOPHILS # BLD AUTO: 0.03 10*3/MM3 (ref 0–0.2)
BASOPHILS NFR BLD AUTO: 0.5 % (ref 0–1.5)
BUN SERPL-MCNC: 16 MG/DL (ref 6–20)
BUN/CREAT SERPL: 15.4 (ref 7–25)
CALCIUM SPEC-SCNC: 9.1 MG/DL (ref 8.6–10.5)
CHLORIDE SERPL-SCNC: 107 MMOL/L (ref 98–107)
CO2 SERPL-SCNC: 21.4 MMOL/L (ref 22–29)
CREAT SERPL-MCNC: 1.04 MG/DL (ref 0.76–1.27)
DEPRECATED RDW RBC AUTO: 38.9 FL (ref 37–54)
EGFRCR SERPLBLD CKD-EPI 2021: 86.9 ML/MIN/1.73
EOSINOPHIL # BLD AUTO: 0.04 10*3/MM3 (ref 0–0.4)
EOSINOPHIL NFR BLD AUTO: 0.7 % (ref 0.3–6.2)
ERYTHROCYTE [DISTWIDTH] IN BLOOD BY AUTOMATED COUNT: 12.5 % (ref 12.3–15.4)
GLUCOSE SERPL-MCNC: 107 MG/DL (ref 65–99)
HCT VFR BLD AUTO: 42.6 % (ref 37.5–51)
HGB BLD-MCNC: 14.5 G/DL (ref 13–17.7)
IMM GRANULOCYTES # BLD AUTO: 0.02 10*3/MM3 (ref 0–0.05)
IMM GRANULOCYTES NFR BLD AUTO: 0.3 % (ref 0–0.5)
LYMPHOCYTES # BLD AUTO: 2.32 10*3/MM3 (ref 0.7–3.1)
LYMPHOCYTES NFR BLD AUTO: 38.8 % (ref 19.6–45.3)
MCH RBC QN AUTO: 29.5 PG (ref 26.6–33)
MCHC RBC AUTO-ENTMCNC: 34 G/DL (ref 31.5–35.7)
MCV RBC AUTO: 86.8 FL (ref 79–97)
MONOCYTES # BLD AUTO: 0.45 10*3/MM3 (ref 0.1–0.9)
MONOCYTES NFR BLD AUTO: 7.5 % (ref 5–12)
NEUTROPHILS NFR BLD AUTO: 3.12 10*3/MM3 (ref 1.7–7)
NEUTROPHILS NFR BLD AUTO: 52.2 % (ref 42.7–76)
NRBC BLD AUTO-RTO: 0 /100 WBC (ref 0–0.2)
PLATELET # BLD AUTO: 142 10*3/MM3 (ref 140–450)
PMV BLD AUTO: 11.6 FL (ref 6–12)
POTASSIUM SERPL-SCNC: 4.4 MMOL/L (ref 3.5–5.2)
RBC # BLD AUTO: 4.91 10*6/MM3 (ref 4.14–5.8)
SODIUM SERPL-SCNC: 139 MMOL/L (ref 136–145)
WBC NRBC COR # BLD: 5.98 10*3/MM3 (ref 3.4–10.8)

## 2022-10-21 PROCEDURE — 85025 COMPLETE CBC W/AUTO DIFF WBC: CPT

## 2022-10-21 PROCEDURE — 80048 BASIC METABOLIC PNL TOTAL CA: CPT

## 2022-10-21 PROCEDURE — 36415 COLL VENOUS BLD VENIPUNCTURE: CPT

## 2022-10-24 ENCOUNTER — HOSPITAL ENCOUNTER (OUTPATIENT)
Facility: HOSPITAL | Age: 52
Setting detail: HOSPITAL OUTPATIENT SURGERY
Discharge: HOME | End: 2022-10-24
Attending: INTERNAL MEDICINE | Admitting: INTERNAL MEDICINE

## 2022-10-24 ENCOUNTER — ANESTHESIA (OUTPATIENT)
Dept: CARDIOLOGY | Facility: HOSPITAL | Age: 52
End: 2022-10-24

## 2022-10-24 ENCOUNTER — ANESTHESIA EVENT (OUTPATIENT)
Dept: CARDIOLOGY | Facility: HOSPITAL | Age: 52
End: 2022-10-24

## 2022-10-24 VITALS
RESPIRATION RATE: 18 BRPM | DIASTOLIC BLOOD PRESSURE: 76 MMHG | HEART RATE: 78 BPM | WEIGHT: 250 LBS | HEIGHT: 77 IN | BODY MASS INDEX: 29.52 KG/M2 | SYSTOLIC BLOOD PRESSURE: 114 MMHG | OXYGEN SATURATION: 98 % | TEMPERATURE: 97 F

## 2022-10-24 DIAGNOSIS — I48.0 PAF (PAROXYSMAL ATRIAL FIBRILLATION): ICD-10-CM

## 2022-10-24 DIAGNOSIS — F41.9 ANXIETY: Chronic | ICD-10-CM

## 2022-10-24 LAB
ACT BLD: 306 SECONDS (ref 82–152)
ACT BLD: 352 SECONDS (ref 82–152)
ACT BLD: 381 SECONDS (ref 82–152)
QT INTERVAL: 438 MS

## 2022-10-24 PROCEDURE — 25010000002 HEPARIN (PORCINE) PER 1000 UNITS: Performed by: INTERNAL MEDICINE

## 2022-10-24 PROCEDURE — C1759 CATH, INTRA ECHOCARDIOGRAPHY: HCPCS | Performed by: INTERNAL MEDICINE

## 2022-10-24 PROCEDURE — 25010000002 ONDANSETRON PER 1 MG: Performed by: ANESTHESIOLOGY

## 2022-10-24 PROCEDURE — 93656 COMPRE EP EVAL ABLTJ ATR FIB: CPT | Performed by: INTERNAL MEDICINE

## 2022-10-24 PROCEDURE — 93010 ELECTROCARDIOGRAM REPORT: CPT | Performed by: INTERNAL MEDICINE

## 2022-10-24 PROCEDURE — C1732 CATH, EP, DIAG/ABL, 3D/VECT: HCPCS | Performed by: INTERNAL MEDICINE

## 2022-10-24 PROCEDURE — 85347 COAGULATION TIME ACTIVATED: CPT

## 2022-10-24 PROCEDURE — C1733 CATH, EP, OTHR THAN COOL-TIP: HCPCS | Performed by: INTERNAL MEDICINE

## 2022-10-24 PROCEDURE — C1766 INTRO/SHEATH,STRBLE,NON-PEEL: HCPCS | Performed by: INTERNAL MEDICINE

## 2022-10-24 PROCEDURE — 25010000002 PHENYLEPHRINE 10 MG/ML SOLUTION: Performed by: ANESTHESIOLOGY

## 2022-10-24 PROCEDURE — 25010000002 DEXAMETHASONE PER 1 MG: Performed by: ANESTHESIOLOGY

## 2022-10-24 PROCEDURE — S0260 H&P FOR SURGERY: HCPCS | Performed by: INTERNAL MEDICINE

## 2022-10-24 PROCEDURE — C1894 INTRO/SHEATH, NON-LASER: HCPCS | Performed by: INTERNAL MEDICINE

## 2022-10-24 PROCEDURE — 25010000002 NEOSTIGMINE 5 MG/10ML SOLUTION: Performed by: ANESTHESIOLOGY

## 2022-10-24 PROCEDURE — 0 IOPAMIDOL PER 1 ML: Performed by: INTERNAL MEDICINE

## 2022-10-24 PROCEDURE — 25010000002 MIDAZOLAM PER 1 MG: Performed by: ANESTHESIOLOGY

## 2022-10-24 PROCEDURE — 25010000002 FENTANYL CITRATE (PF) 50 MCG/ML SOLUTION: Performed by: ANESTHESIOLOGY

## 2022-10-24 PROCEDURE — 25010000002 PROPOFOL 10 MG/ML EMULSION: Performed by: ANESTHESIOLOGY

## 2022-10-24 PROCEDURE — 93005 ELECTROCARDIOGRAM TRACING: CPT | Performed by: INTERNAL MEDICINE

## 2022-10-24 RX ORDER — SODIUM CHLORIDE 9 MG/ML
75 INJECTION, SOLUTION INTRAVENOUS CONTINUOUS
Status: DISCONTINUED | OUTPATIENT
Start: 2022-10-24 | End: 2022-10-24 | Stop reason: HOSPADM

## 2022-10-24 RX ORDER — LIDOCAINE HYDROCHLORIDE 20 MG/ML
INJECTION, SOLUTION INFILTRATION; PERINEURAL AS NEEDED
Status: DISCONTINUED | OUTPATIENT
Start: 2022-10-24 | End: 2022-10-24 | Stop reason: SURG

## 2022-10-24 RX ORDER — SODIUM CHLORIDE, SODIUM LACTATE, POTASSIUM CHLORIDE, CALCIUM CHLORIDE 600; 310; 30; 20 MG/100ML; MG/100ML; MG/100ML; MG/100ML
9 INJECTION, SOLUTION INTRAVENOUS CONTINUOUS
Status: DISCONTINUED | OUTPATIENT
Start: 2022-10-24 | End: 2022-10-24 | Stop reason: HOSPADM

## 2022-10-24 RX ORDER — HEPARIN SODIUM 1000 [USP'U]/ML
INJECTION, SOLUTION INTRAVENOUS; SUBCUTANEOUS
Status: DISCONTINUED | OUTPATIENT
Start: 2022-10-24 | End: 2022-10-24 | Stop reason: HOSPADM

## 2022-10-24 RX ORDER — ROCURONIUM BROMIDE 10 MG/ML
INJECTION, SOLUTION INTRAVENOUS AS NEEDED
Status: DISCONTINUED | OUTPATIENT
Start: 2022-10-24 | End: 2022-10-24 | Stop reason: SURG

## 2022-10-24 RX ORDER — NALOXONE HCL 0.4 MG/ML
0.4 VIAL (ML) INJECTION
Status: DISCONTINUED | OUTPATIENT
Start: 2022-10-24 | End: 2022-10-24 | Stop reason: HOSPADM

## 2022-10-24 RX ORDER — FAMOTIDINE 10 MG/ML
20 INJECTION, SOLUTION INTRAVENOUS ONCE
Status: COMPLETED | OUTPATIENT
Start: 2022-10-24 | End: 2022-10-24

## 2022-10-24 RX ORDER — DIPHENHYDRAMINE HYDROCHLORIDE 50 MG/ML
12.5 INJECTION INTRAMUSCULAR; INTRAVENOUS
Status: DISCONTINUED | OUTPATIENT
Start: 2022-10-24 | End: 2022-10-24 | Stop reason: HOSPADM

## 2022-10-24 RX ORDER — HYDROCODONE BITARTRATE AND ACETAMINOPHEN 7.5; 325 MG/1; MG/1
1 TABLET ORAL ONCE AS NEEDED
Status: DISCONTINUED | OUTPATIENT
Start: 2022-10-24 | End: 2022-10-24 | Stop reason: HOSPADM

## 2022-10-24 RX ORDER — EPHEDRINE SULFATE 50 MG/ML
INJECTION, SOLUTION INTRAVENOUS AS NEEDED
Status: DISCONTINUED | OUTPATIENT
Start: 2022-10-24 | End: 2022-10-24 | Stop reason: SURG

## 2022-10-24 RX ORDER — OXYCODONE AND ACETAMINOPHEN 7.5; 325 MG/1; MG/1
1 TABLET ORAL EVERY 4 HOURS PRN
Status: DISCONTINUED | OUTPATIENT
Start: 2022-10-24 | End: 2022-10-24 | Stop reason: HOSPADM

## 2022-10-24 RX ORDER — LIDOCAINE HYDROCHLORIDE 10 MG/ML
0.5 INJECTION, SOLUTION EPIDURAL; INFILTRATION; INTRACAUDAL; PERINEURAL ONCE AS NEEDED
Status: DISCONTINUED | OUTPATIENT
Start: 2022-10-24 | End: 2022-10-24 | Stop reason: HOSPADM

## 2022-10-24 RX ORDER — PROPOFOL 10 MG/ML
VIAL (ML) INTRAVENOUS AS NEEDED
Status: DISCONTINUED | OUTPATIENT
Start: 2022-10-24 | End: 2022-10-24 | Stop reason: SURG

## 2022-10-24 RX ORDER — SODIUM CHLORIDE 0.9 % (FLUSH) 0.9 %
10 SYRINGE (ML) INJECTION EVERY 12 HOURS SCHEDULED
Status: DISCONTINUED | OUTPATIENT
Start: 2022-10-24 | End: 2022-10-24 | Stop reason: HOSPADM

## 2022-10-24 RX ORDER — GLYCOPYRROLATE 0.2 MG/ML
INJECTION INTRAMUSCULAR; INTRAVENOUS AS NEEDED
Status: DISCONTINUED | OUTPATIENT
Start: 2022-10-24 | End: 2022-10-24 | Stop reason: SURG

## 2022-10-24 RX ORDER — LABETALOL HYDROCHLORIDE 5 MG/ML
5 INJECTION, SOLUTION INTRAVENOUS
Status: DISCONTINUED | OUTPATIENT
Start: 2022-10-24 | End: 2022-10-24 | Stop reason: HOSPADM

## 2022-10-24 RX ORDER — IBUPROFEN 600 MG/1
600 TABLET ORAL ONCE AS NEEDED
Status: DISCONTINUED | OUTPATIENT
Start: 2022-10-24 | End: 2022-10-24 | Stop reason: HOSPADM

## 2022-10-24 RX ORDER — HYDROMORPHONE HYDROCHLORIDE 1 MG/ML
0.5 INJECTION, SOLUTION INTRAMUSCULAR; INTRAVENOUS; SUBCUTANEOUS
Status: DISCONTINUED | OUTPATIENT
Start: 2022-10-24 | End: 2022-10-24 | Stop reason: HOSPADM

## 2022-10-24 RX ORDER — NALOXONE HCL 0.4 MG/ML
0.2 VIAL (ML) INJECTION AS NEEDED
Status: DISCONTINUED | OUTPATIENT
Start: 2022-10-24 | End: 2022-10-24 | Stop reason: HOSPADM

## 2022-10-24 RX ORDER — PROMETHAZINE HYDROCHLORIDE 25 MG/1
25 SUPPOSITORY RECTAL ONCE AS NEEDED
Status: DISCONTINUED | OUTPATIENT
Start: 2022-10-24 | End: 2022-10-24 | Stop reason: HOSPADM

## 2022-10-24 RX ORDER — ONDANSETRON 2 MG/ML
INJECTION INTRAMUSCULAR; INTRAVENOUS AS NEEDED
Status: DISCONTINUED | OUTPATIENT
Start: 2022-10-24 | End: 2022-10-24 | Stop reason: SURG

## 2022-10-24 RX ORDER — SODIUM CHLORIDE 0.9 % (FLUSH) 0.9 %
3 SYRINGE (ML) INJECTION EVERY 12 HOURS SCHEDULED
Status: DISCONTINUED | OUTPATIENT
Start: 2022-10-24 | End: 2022-10-24 | Stop reason: HOSPADM

## 2022-10-24 RX ORDER — ONDANSETRON 2 MG/ML
4 INJECTION INTRAMUSCULAR; INTRAVENOUS ONCE AS NEEDED
Status: DISCONTINUED | OUTPATIENT
Start: 2022-10-24 | End: 2022-10-24 | Stop reason: HOSPADM

## 2022-10-24 RX ORDER — MIDAZOLAM HYDROCHLORIDE 1 MG/ML
1 INJECTION INTRAMUSCULAR; INTRAVENOUS
Status: DISCONTINUED | OUTPATIENT
Start: 2022-10-24 | End: 2022-10-24 | Stop reason: HOSPADM

## 2022-10-24 RX ORDER — FLUMAZENIL 0.1 MG/ML
0.2 INJECTION INTRAVENOUS AS NEEDED
Status: DISCONTINUED | OUTPATIENT
Start: 2022-10-24 | End: 2022-10-24 | Stop reason: HOSPADM

## 2022-10-24 RX ORDER — FENTANYL CITRATE 50 UG/ML
50 INJECTION, SOLUTION INTRAMUSCULAR; INTRAVENOUS
Status: DISCONTINUED | OUTPATIENT
Start: 2022-10-24 | End: 2022-10-24 | Stop reason: HOSPADM

## 2022-10-24 RX ORDER — EPHEDRINE SULFATE 50 MG/ML
5 INJECTION, SOLUTION INTRAVENOUS ONCE AS NEEDED
Status: DISCONTINUED | OUTPATIENT
Start: 2022-10-24 | End: 2022-10-24 | Stop reason: HOSPADM

## 2022-10-24 RX ORDER — HYDRALAZINE HYDROCHLORIDE 20 MG/ML
5 INJECTION INTRAMUSCULAR; INTRAVENOUS
Status: DISCONTINUED | OUTPATIENT
Start: 2022-10-24 | End: 2022-10-24 | Stop reason: HOSPADM

## 2022-10-24 RX ORDER — DEXAMETHASONE SODIUM PHOSPHATE 4 MG/ML
INJECTION, SOLUTION INTRA-ARTICULAR; INTRALESIONAL; INTRAMUSCULAR; INTRAVENOUS; SOFT TISSUE AS NEEDED
Status: DISCONTINUED | OUTPATIENT
Start: 2022-10-24 | End: 2022-10-24 | Stop reason: SURG

## 2022-10-24 RX ORDER — SODIUM CHLORIDE 0.9 % (FLUSH) 0.9 %
10 SYRINGE (ML) INJECTION AS NEEDED
Status: DISCONTINUED | OUTPATIENT
Start: 2022-10-24 | End: 2022-10-24 | Stop reason: HOSPADM

## 2022-10-24 RX ORDER — PHENYLEPHRINE HYDROCHLORIDE 10 MG/ML
INJECTION INTRAVENOUS AS NEEDED
Status: DISCONTINUED | OUTPATIENT
Start: 2022-10-24 | End: 2022-10-24 | Stop reason: SURG

## 2022-10-24 RX ORDER — SODIUM CHLORIDE 0.9 % (FLUSH) 0.9 %
3-10 SYRINGE (ML) INJECTION AS NEEDED
Status: DISCONTINUED | OUTPATIENT
Start: 2022-10-24 | End: 2022-10-24 | Stop reason: HOSPADM

## 2022-10-24 RX ORDER — NEOSTIGMINE METHYLSULFATE 0.5 MG/ML
INJECTION, SOLUTION INTRAVENOUS AS NEEDED
Status: DISCONTINUED | OUTPATIENT
Start: 2022-10-24 | End: 2022-10-24 | Stop reason: SURG

## 2022-10-24 RX ORDER — PROMETHAZINE HYDROCHLORIDE 12.5 MG/1
25 TABLET ORAL ONCE AS NEEDED
Status: DISCONTINUED | OUTPATIENT
Start: 2022-10-24 | End: 2022-10-24 | Stop reason: HOSPADM

## 2022-10-24 RX ORDER — DIPHENHYDRAMINE HCL 25 MG
25 CAPSULE ORAL
Status: DISCONTINUED | OUTPATIENT
Start: 2022-10-24 | End: 2022-10-24 | Stop reason: HOSPADM

## 2022-10-24 RX ORDER — ONDANSETRON 2 MG/ML
4 INJECTION INTRAMUSCULAR; INTRAVENOUS EVERY 6 HOURS PRN
Status: DISCONTINUED | OUTPATIENT
Start: 2022-10-24 | End: 2022-10-24 | Stop reason: HOSPADM

## 2022-10-24 RX ORDER — FENTANYL CITRATE 50 UG/ML
INJECTION, SOLUTION INTRAMUSCULAR; INTRAVENOUS AS NEEDED
Status: DISCONTINUED | OUTPATIENT
Start: 2022-10-24 | End: 2022-10-24 | Stop reason: SURG

## 2022-10-24 RX ADMIN — FENTANYL CITRATE 50 MCG: 0.05 INJECTION, SOLUTION INTRAMUSCULAR; INTRAVENOUS at 12:38

## 2022-10-24 RX ADMIN — ROCURONIUM BROMIDE 50 MG: 50 INJECTION INTRAVENOUS at 11:17

## 2022-10-24 RX ADMIN — EPHEDRINE SULFATE 10 MG: 50 INJECTION INTRAMUSCULAR; INTRAVENOUS; SUBCUTANEOUS at 11:33

## 2022-10-24 RX ADMIN — EPHEDRINE SULFATE 5 MG: 50 INJECTION INTRAMUSCULAR; INTRAVENOUS; SUBCUTANEOUS at 11:49

## 2022-10-24 RX ADMIN — FENTANYL CITRATE 50 MCG: 0.05 INJECTION, SOLUTION INTRAMUSCULAR; INTRAVENOUS at 11:11

## 2022-10-24 RX ADMIN — PHENYLEPHRINE HYDROCHLORIDE 100 MCG: 10 INJECTION, SOLUTION INTRAVENOUS at 11:29

## 2022-10-24 RX ADMIN — ONDANSETRON 4 MG: 2 INJECTION INTRAMUSCULAR; INTRAVENOUS at 13:19

## 2022-10-24 RX ADMIN — NEOSTIGMINE METHYLSULFATE 4 MG: 0.5 INJECTION INTRAVENOUS at 13:19

## 2022-10-24 RX ADMIN — FAMOTIDINE 20 MG: 10 INJECTION INTRAVENOUS at 10:42

## 2022-10-24 RX ADMIN — MIDAZOLAM 1 MG: 1 INJECTION, SOLUTION INTRAMUSCULAR; INTRAVENOUS at 10:42

## 2022-10-24 RX ADMIN — PHENYLEPHRINE HYDROCHLORIDE 100 MCG: 10 INJECTION, SOLUTION INTRAVENOUS at 11:58

## 2022-10-24 RX ADMIN — PHENYLEPHRINE HYDROCHLORIDE 100 MCG: 10 INJECTION, SOLUTION INTRAVENOUS at 12:12

## 2022-10-24 RX ADMIN — PROPOFOL 200 MG: 10 INJECTION, EMULSION INTRAVENOUS at 11:17

## 2022-10-24 RX ADMIN — DEXAMETHASONE SODIUM PHOSPHATE 8 MG: 4 INJECTION, SOLUTION INTRA-ARTICULAR; INTRALESIONAL; INTRAMUSCULAR; INTRAVENOUS; SOFT TISSUE at 11:26

## 2022-10-24 RX ADMIN — PROPOFOL 50 MG: 10 INJECTION, EMULSION INTRAVENOUS at 12:38

## 2022-10-24 RX ADMIN — GLYCOPYRROLATE 0.5 MG: 0.2 INJECTION INTRAMUSCULAR; INTRAVENOUS at 13:19

## 2022-10-24 RX ADMIN — LIDOCAINE HYDROCHLORIDE 60 MG: 20 INJECTION, SOLUTION INFILTRATION; PERINEURAL at 11:17

## 2022-10-24 RX ADMIN — SODIUM CHLORIDE 75 ML/HR: 9 INJECTION, SOLUTION INTRAVENOUS at 10:09

## 2022-10-24 NOTE — ANESTHESIA PREPROCEDURE EVALUATION
Anesthesia Evaluation     Patient summary reviewed and Nursing notes reviewed                Airway   Mallampati: II  TM distance: >3 FB  Neck ROM: full  no difficulty expected  Dental - normal exam     Pulmonary - normal exam   (+) shortness of breath, sleep apnea,   Cardiovascular - normal exam    ECG reviewed    (+) dysrhythmias Atrial Fib, Paroxysmal Atrial Fib, THOMAS, DVT,     ROS comment: Reviewed echo:    ? Calculated left ventricular EF = 56.5% Estimated left ventricular EF was in agreement with the calculated left ventricular EF. Left ventricular systolic function is normal.  ? Left ventricular diastolic function was normal.  ? Trace to mild mitral valve regurgitation is present.  ? Trace to mild tricuspid valve regurgitation is present.  ? Estimated right ventricular systolic pressure from tricuspid regurgitation is normal (<35 mmHg).         Neuro/Psych  (+) syncope, psychiatric history Anxiety,    GI/Hepatic/Renal/Endo      Musculoskeletal     Abdominal  - normal exam   Substance History      OB/GYN          Other                      Anesthesia Plan    ASA 3     general     intravenous induction     Anesthetic plan, risks, benefits, and alternatives have been provided, discussed and informed consent has been obtained with: patient.        CODE STATUS:

## 2022-10-24 NOTE — DISCHARGE INSTRUCTIONS
[facility]  Cardiology  [address] [place] [address]  [phone]    Coronary Ablation After Care    Refer to this sheet in the next few weeks. These instructions provide you with information on caring for yourself after your procedure. Your health care provider may also give you more specific instructions. Your treatment has been planned according to current medical practices, but problems sometimes occur. Call your health care provider if you have any problems or questions after your procedure.      What to Expect After the Procedure:  After your procedure, it is typical to have the following sensations:  Minor discomfort or tenderness and a small bump at the catheter insertion site(s). The bump(s) should usually decrease in size and tenderness within 1 to 2 weeks.  Any bruising will usually fade within 2 to 4 weeks.  Home Care Instructions:  Do not apply powder or lotion to the site.  Do not take baths, swim, or use a hot tub until your health care provider approves and the site is completely healed.  Do not bend, squat, or lift anything over 20 lb (9 kg) or as directed by your health care provider. However, we recommend lifting nothing heavier than a gallon of milk.    You may shower 24 hours after the procedure. Remove the bandage (dressing) and gently wash the site with plain soap and water. Gently pat the site dry. You may apply a band aid daily for 2 days if desired.    Inspect the site at least twice daily.  Increase your fluid intake for the next 2 days.    Limit your activity for the first 48 hours.   Avoid strenuous activity for 1 week or as advised by your physician.    Follow instructions about when you can drive or return to work as directed by your physician.    Hold direct pressure over the site when you cough, sneeze, laugh or change positions.  Do this for the next 2 days.    Call Your Doctor If:  You have drainage (other than a small amount of blood on the dressing).  You  have chills or a fever > 101.  You have redness, warmth, swelling (larger than a walnut), or pain at the insertion   You develop palpitations, chest pain or shortness of breath, feel faint, or pass out.  You develop pain, discoloration, coldness, numbness, tingling, or severe bruising in the leg that held the catheter.  You develop bleeding from any other place, such as the bowels.  You have heavy bleeding from the site.  If this happens, hold pressure on the site and call 911.        Make Sure You:  Understand these instructions.  Will watch your condition.  Will get help right away if you are not doing well or get worse.

## 2022-10-24 NOTE — ANESTHESIA POSTPROCEDURE EVALUATION
"Patient: Alli Bello    Procedure Summary     Date: 10/24/22 Room / Location: [facility]  /  [facility]    Anesthesia Start: 1103 Anesthesia Stop: 1340    Procedures:       Ablation atrial fibrillation carto, cryo      3D MAPPING CARTO EP Diagnosis:       PAF (paroxysmal atrial fibrillation) (HCC)      (paroxsymal atrial fibrillation)    Providers: Vic Bradley MD Provider: Laura Rockwell MD    Anesthesia Type: general ASA Status: 3          Anesthesia Type: general    Vitals  Vitals Value Taken Time   /87 10/24/22 1510   Temp 36.1 °C (97 °F) 10/24/22 1340   Pulse 76 10/24/22 1512   Resp 16 10/24/22 1455   SpO2 97 % 10/24/22 1512   Vitals shown include unvalidated device data.        Post Anesthesia Care and Evaluation    Patient location during evaluation: PACU  Patient participation: complete - patient participated  Level of consciousness: awake  Pain management: adequate    Airway patency: patent  Anesthetic complications: No anesthetic complications    Cardiovascular status: acceptable  Respiratory status: acceptable  Hydration status: acceptable    Comments: /96   Pulse 77   Temp 36.1 °C (97 °F) (Temporal)   Resp 16   Ht 195.6 cm (77\")   Wt 113 kg (250 lb)   SpO2 100%   BMI 29.65 kg/m²       "

## 2022-10-24 NOTE — ANESTHESIA PROCEDURE NOTES
Airway  Urgency: elective    Airway not difficult    General Information and Staff    Patient location during procedure: OR  Anesthesiologist: Laura Rockwell MD    Indications and Patient Condition  Indications for airway management: airway protection    Preoxygenated: yes  MILS maintained throughout  Mask difficulty assessment: 1 - vent by mask    Final Airway Details  Final airway type: endotracheal airway      Successful airway: ETT  Cuffed: yes   Successful intubation technique: direct laryngoscopy  Facilitating devices/methods: anterior pressure/BURP and Bougie  Endotracheal tube insertion site: oral  Blade: Johny  Blade size: 3  ETT size (mm): 7.5  Cormack-Lehane Classification: grade IIb - view of arytenoids or posterior of glottis only  Placement verified by: chest auscultation and capnometry   Measured from: lips  Number of attempts at approach: 1  Assessment: lips, teeth, and gum same as pre-op and atraumatic intubation

## 2022-10-25 NOTE — H&P
[facility]   HISTORY AND PHYSICAL    Patient Name:Alli Bello  : 1970  MRN: [id number]  Primary Care Physician: Alexa Vega MD  Date of admission: 10/24/2022    Subjective   Subjective     Chief Complaint:   Paroxysmal Atrial Fibrillation    History of Present Illness   Alli Bello is a 52 y.o. male with a history of paroxysmal atrial fibrillation.      He has a several year history of PAF.  He typically has at least monthly episodes that last up to hours.      He has been treated with flecainide.  He continues to have episodes however, and wishes to proceed with ablation.     Review of Systems   Constitutional: Negative for activity change and fatigue.   Eyes: Negative.    Respiratory: Negative for chest tightness and shortness of breath.    Cardiovascular: Positive for palpitations. Negative for chest pain and leg swelling.   Gastrointestinal: Negative.    Endocrine: Negative.    Genitourinary: Negative.    Musculoskeletal: Negative.    Skin: Negative.    Neurological: Negative for dizziness and syncope.   Hematological: Negative.    Psychiatric/Behavioral: Negative.          Personal History     Past Medical History:   Diagnosis Date   • Achilles tendon injury     LEFT, SCHEDULED FOR REPAIR   • Anxiety attack    • Aortic stenosis    • Atrial fibrillation (HCC)    • Deep venous thrombosis of calf (HCC)     right   • THOMAS (dyspnea on exertion)     since fib started   • Fatigue     mild   • Lightheaded    • Malaise and fatigue    • PAC (premature atrial contraction)     very rare   • PAF (paroxysmal atrial fibrillation) (HCC)    • Palpitations     gets lightheaded with it   • Pulmonary nodule    • Sleep apnea     TO BRING DOS, CPAP   • Snoring     no testing   • SOB (shortness of breath)     mildly   • Syncope     ONLY WITH A-FIB   • Syncope and collapse        Past Surgical History:   Procedure Laterality Date   • ACHILLES TENDON SURGERY Left 2019    Procedure: ACHILLES TENDON  REPAIR;  Surgeon: Angel Baez MD;  Location: [facility];  Service: Orthopedics   • CARDIOVERSION  1998    D/T A FIB       Family History: His family history includes Arrhythmia in his brother and mother; Atrial fibrillation in his brother and mother; Coronary artery disease in his father; Heart attack in his father; Heart disease in his father; Hyperlipidemia in his father; Hypertension in his father; Stroke in his paternal grandmother.     Social History: He  reports that he has never smoked. He has never used smokeless tobacco. He reports current alcohol use of about 5.0 - 6.0 standard drinks per week. He reports that he does not use drugs.    Home Medications:  LORazepam, citalopram, flecainide, metoprolol succinate XL, and rivaroxaban    Allergies:  He has No Known Allergies.    Objective    Objective     Vitals:    Heart Rate:  [78] 78  Resp:  [18] 18  BP: (112-114)/(72-76) 114/76    Physical Exam  Vitals and nursing note reviewed.   Constitutional:       General: He is not in acute distress.     Appearance: He is not diaphoretic.   HENT:      Mouth/Throat:      Pharynx: No oropharyngeal exudate.   Eyes:      General: No scleral icterus.  Neck:      Trachea: No tracheal deviation.   Cardiovascular:      Rate and Rhythm: Normal rate and regular rhythm.   Pulmonary:      Effort: Pulmonary effort is normal. No respiratory distress.      Breath sounds: Normal breath sounds.   Abdominal:      General: There is no distension.      Palpations: Abdomen is soft.   Skin:     General: Skin is warm and dry.   Neurological:      Mental Status: He is alert and oriented to person, place, and time.   Psychiatric:         Behavior: Behavior normal.         Thought Content: Thought content normal.         Judgment: Judgment normal.          Result Review    Result Review:  I have personally reviewed the results from the time of this admission to 10/25/2022 17:34 EDT and agree with these findings:  []  Laboratory list /  accordion  []  Microbiology  []  Radiology  []  EKG/Telemetry   []  Cardiology/Vascular   []  Pathology  []  Old records  []  Other:  Most notable findings include: sinus rhythm      Assessment & Plan   Assessment / Plan     Brief Patient Summary:  Alli Bello is a 52 y.o. male with paroxysmal atrial fibrillation.  Refractory to treatment with flecainide.  He is agreeable and wishes to proceed with ablation.     Active Hospital Problems:  Active Hospital Problems    Diagnosis    • **PAF (paroxysmal atrial fibrillation) ([facility])      Plan:   Proceed with ablation.     DVT prophylaxis:  No DVT prophylaxis order currently exists.    Vic Bradley MD

## 2022-12-01 ENCOUNTER — OFFICE VISIT (OUTPATIENT)
Dept: CARDIOLOGY | Facility: CLINIC | Age: 52
End: 2022-12-01

## 2022-12-01 VITALS
BODY MASS INDEX: 30.56 KG/M2 | HEIGHT: 76 IN | DIASTOLIC BLOOD PRESSURE: 84 MMHG | HEART RATE: 72 BPM | WEIGHT: 251 LBS | SYSTOLIC BLOOD PRESSURE: 112 MMHG

## 2022-12-01 DIAGNOSIS — I48.0 PAF (PAROXYSMAL ATRIAL FIBRILLATION): Primary | Chronic | ICD-10-CM

## 2022-12-01 PROCEDURE — 99213 OFFICE O/P EST LOW 20 MIN: CPT

## 2022-12-01 PROCEDURE — 93000 ELECTROCARDIOGRAM COMPLETE: CPT

## 2022-12-01 NOTE — PROGRESS NOTES
Date of Office Visit: 2022  Encounter Provider: DIONICIO Reyes  Place of Service: [facility] CARDIOLOGY  Patient Name: Alli Bello  :1970    Chief Complaint   Patient presents with   • paroxysmal AFIB   • s/p ablation 10/24   :     HPI: Alli Bello is a 52 y.o. male who  is a patient of Dr. Kelly, referred to Dr. Bradley for paroxsymal atrial fibrillation.  He has a history of DVT, MIKA, PAC, and PAF.      He saw Dr. Bradley for the first time on 2022.  He has a long history of PAF going back several years.  His episodes used to occur annually but had recently increased in frequency.  These episodes usually last several hours to days.  In the past he was managed with pill in pocket method. Dr. Bradley discussed rhythm control; ablation vs. Continuous antiarrhythmic therapy.  He did not wish to pursue ablation and elected to start flecainide 50mg BID.  He wished to continue taking AC only for symptomatic episodes.     I saw him on 2022 since starting flecainide he continued to have episodes of PAF but felt like they were less frequent and shorter in duration.  We discussed ablation but he felt like his AF was controlled on flecainide so he was reluctant to pursue.     He called the office in September complaining of breakthrough episodes refractory to flecainide. After discussion with Dr. Bradley, he elected to move forward with ablation to establish NSR.  Patient had PVI with Dr. Bradley on 10/25/2022.                      He presents today for follow up appt.     Since the ablation he has had one episode of atrial fibrillation.  He says that this occurred on  and lasted about 12 hours, before the ablation would last 24-48 hours.  He experienced similar symptoms of lightheadedness, palpitations, and fatigue.     He has not had anymore episodes in almost a month since the ablation.  Denies any further symptoms of chest pain, dyspnea, fatigue,  or syncope.     He is still in the blanking period.     EKG today shows NSR. He is currently on flecainide and metoprolol.     R-ban for AC.         Past Medical History:   Diagnosis Date   • Achilles tendon injury     LEFT, SCHEDULED FOR REPAIR   • Anxiety attack    • Aortic stenosis    • Atrial fibrillation (HCC)    • Deep venous thrombosis of calf (HCC)     right   • THOMAS (dyspnea on exertion)     since fib started   • Fatigue     mild   • Lightheaded    • Malaise and fatigue    • PAC (premature atrial contraction)     very rare   • PAF (paroxysmal atrial fibrillation) (HCC)    • Palpitations     gets lightheaded with it   • Pulmonary nodule    • Sleep apnea     TO BRING DOS, CPAP   • Snoring     no testing   • SOB (shortness of breath)     mildly   • Syncope     ONLY WITH A-FIB   • Syncope and collapse        Past Surgical History:   Procedure Laterality Date   • ACHILLES TENDON SURGERY Left 03/27/2019    Procedure: ACHILLES TENDON REPAIR;  Surgeon: Angel Baez MD;  Location: [facility] OR;  Service: Orthopedics   • CARDIAC ELECTROPHYSIOLOGY PROCEDURE N/A 10/24/2022    Procedure: Ablation atrial fibrillation carto, cryo;  Surgeon: Vic Bradley MD;  Location: [facility] INVASIVE LOCATION;  Service: Cardiovascular;  Laterality: N/A;   • CARDIOVERSION  1998    D/T A FIB       Social History     Socioeconomic History   • Marital status:    Tobacco Use   • Smoking status: Never   • Smokeless tobacco: Never   • Tobacco comments:     caffine use   Vaping Use   • Vaping Use: Never used   Substance and Sexual Activity   • Alcohol use: Yes     Alcohol/week: 5.0 - 6.0 standard drinks     Types: 5 - 6 Standard drinks or equivalent per week     Comment: 2-3 nights/week   • Drug use: No     Comment: Reports drinking one caffeinated beverage per day   • Sexual activity: Yes       Family History   Problem Relation Age of Onset   • Heart disease Father    • Hypertension Father    • Coronary artery disease  "Father         CAD/CHD <59yo   • Hyperlipidemia Father    • Heart attack Father    • Stroke Paternal Grandmother    • Arrhythmia Mother    • Atrial fibrillation Mother    • Arrhythmia Brother    • Atrial fibrillation Brother        Review of Systems   Constitutional: Negative for chills, fever and malaise/fatigue.   Cardiovascular: Positive for palpitations. Negative for chest pain, dyspnea on exertion, leg swelling, near-syncope, orthopnea, paroxysmal nocturnal dyspnea and syncope.   Respiratory: Negative for cough and shortness of breath.    Hematologic/Lymphatic: Negative.    Musculoskeletal: Negative for joint pain, joint swelling and myalgias.   Gastrointestinal: Negative for abdominal pain, diarrhea, melena, nausea and vomiting.   Genitourinary: Negative for frequency and hematuria.   Neurological: Negative for light-headedness, numbness, paresthesias and seizures.   Allergic/Immunologic: Negative.    All other systems reviewed and are negative.      No Known Allergies      Current Outpatient Medications:   •  citalopram (CeleXA) 20 MG tablet, Take 1 tablet by mouth Every Morning., Disp: 90 tablet, Rfl: 1  •  flecainide (TAMBOCOR) 50 MG tablet, Take 1 tablet by mouth 2 (Two) Times a Day., Disp: 180 tablet, Rfl: 1  •  LORazepam (ATIVAN) 0.5 MG tablet, Take 0.5 tablets by mouth Every 8 (Eight) Hours As Needed for Anxiety., Disp: 30 tablet, Rfl: 0  •  metoprolol succinate XL (TOPROL-XL) 25 MG 24 hr tablet, Take 2 tablets by mouth Daily., Disp: 180 tablet, Rfl: 1  •  rivaroxaban (Xarelto) 20 MG tablet, Take 1 tablet by mouth Daily., Disp: 30 tablet, Rfl: 2      Objective:     Vitals:    12/01/22 0931   BP: 112/84   Pulse: 72   Weight: 114 kg (251 lb)   Height: 193 cm (76\")     Body mass index is 30.55 kg/m².    PHYSICAL EXAM:    Vitals Reviewed.   General Appearance: No acute distress, well developed and well nourished.   Eyes: Conjunctiva and lids: No erythema, swelling, or discharge. Sclera non-icteric.   HENT: " Atraumatic, normocephalic. External eyes, ears, and nose normal.   Respiratory: No signs of respiratory distress. Respiration rhythm and depth normal.   Clear to auscultation. No rales, crackles, rhonchi, or wheezing auscultated.   Cardiovascular:  Heart Rate and Rhythm: Normal, Heart Sounds: Normal S1 and S2. No S3 or S4 noted.  Gastrointestinal:  Abdomen soft, non-distended, non-tender.   Musculoskeletal: Normal movement of extremities  Skin: Warm and dry.   Psychiatric: Patient alert and oriented to person, place, and time. Speech and behavior appropriate. Normal mood and affect.       ECG 12 Lead    Date/Time: 12/1/2022 11:34 AM  Performed by: Beni Davey APRN  Authorized by: Beni Davey APRN   Comparison: compared with previous ECG   Similar to previous ECG  Rhythm: sinus rhythm  BPM: 72                Assessment:       Diagnosis Plan   1. PAF (paroxysmal atrial fibrillation) ([facility])               Plan:   1. PAF--refractory to flecainide---s/p PVI (10/24/2022)--- he had one episode of AF about two weeks after ablation, lasted 12 hours-- shorter duration than before procedure.  He has not had any further episodes.  Currently in NSR on EKG today. He is still taking flecainide. R-ban for AC.       I advised him that it is common to experience episodes during the blanking period.  I discussed with Dr. Hammer, we are going to stop his flecainide today and see how he does. He will let me know if he has further episodes and can consider restarting.  Continue metoprolol and R-ban.             He will follow up in 2 months, will consider monitor at that time to assess for AF.             As always, it has been a pleasure to participate in your patient's care.      Sincerely,         DIONICIO Reyes

## 2022-12-05 ENCOUNTER — TELEPHONE (OUTPATIENT)
Dept: CARDIOLOGY | Facility: CLINIC | Age: 52
End: 2022-12-05

## 2022-12-05 NOTE — TELEPHONE ENCOUNTER
I spoke with Alli Bello and updated pt on recommendations from provider.  Pt verbalized understanding and has no further questions at this time.    Thank you,    Lizzy Farrar RN  Triage [facility]  12/05/22 15:20 EST

## 2022-12-05 NOTE — TELEPHONE ENCOUNTER
"Dr. Bradley & Beni,    Pt called this morning s/p ablation 5 weeks ago and informed me he went back into AF yesterday.    Pt was unable to tell me what his HR & BP has been since being in AF but was able to tell me it hasn't been \"racing\".  His associated symptoms were palpitations and lightheadedness, which he says is how he knows he has gone back into AF.    Do you have any recommendations for this patient?    Thank you,    Lizzy Farrar RN  [facility] Triage  12/05/22  11:51 EST    "

## 2022-12-07 ENCOUNTER — DOCUMENTATION (OUTPATIENT)
Dept: CARDIOLOGY | Facility: CLINIC | Age: 52
End: 2022-12-07

## 2022-12-07 NOTE — PROGRESS NOTES
"I offered patient CV today before he goes out of town, he says that he has had them in the past and usually out of \"commission\" for a few days after and does not wish to proceed. He is leaving to watch his daugther graduate from [place] tomorrow.     He says that in the past his cardiologist advised him to take 450mg of flecainide one time dose for chemical CV and he has done this a dozen times. I told him that I would not advise doing this outside of the hospital due to patient safety reasons.     After discussion he is going to start back on flecainide 100mg BID and let us know how he is doing when he gets back in town.   "

## 2022-12-07 NOTE — TELEPHONE ENCOUNTER
Pt called this morning to let you know he is still in A-fib. He is going out of town tomorrow to a graduation and he really wants to take some flec before so he don't feel so bad...Darya

## 2023-02-20 ENCOUNTER — OFFICE VISIT (OUTPATIENT)
Dept: CARDIOLOGY | Facility: CLINIC | Age: 53
End: 2023-02-20
Payer: COMMERCIAL

## 2023-02-20 VITALS
BODY MASS INDEX: 32.15 KG/M2 | HEIGHT: 76 IN | DIASTOLIC BLOOD PRESSURE: 74 MMHG | HEART RATE: 72 BPM | WEIGHT: 264 LBS | SYSTOLIC BLOOD PRESSURE: 108 MMHG

## 2023-02-20 DIAGNOSIS — I48.0 PAF (PAROXYSMAL ATRIAL FIBRILLATION): Primary | Chronic | ICD-10-CM

## 2023-02-20 PROCEDURE — 93000 ELECTROCARDIOGRAM COMPLETE: CPT

## 2023-02-20 PROCEDURE — 99214 OFFICE O/P EST MOD 30 MIN: CPT

## 2023-02-20 RX ORDER — FLECAINIDE ACETATE 50 MG/1
2 TABLET ORAL EVERY 12 HOURS SCHEDULED
COMMUNITY
Start: 2023-01-15

## 2023-02-20 NOTE — PROGRESS NOTES
Date of Office Visit: 2023  Encounter Provider: DIONICIO Reyes  Place of Service: [facility]  Patient Name: Alli Bello  :1970    Chief Complaint   Patient presents with   • Atrial Fibrillation   :     HPI: Alli Bello is a 53 y.o. male who is a patient of Dr. Kelly, referred to Dr. Bradley for paroxsymal atrial fibrillation.  He has a history of DVT, MIKA, PAC, and PAF.      He saw Dr. Bradley for the first time on 2022.  He has a long history of PAF going back several years.  His episodes used to occur annually but had recently increased in frequency.  These episodes usually last several hours to days.  In the past he was managed with pill in pocket method. Dr. Bradley discussed rhythm control; ablation vs. Continuous antiarrhythmic therapy.  He did not wish to pursue ablation and elected to start flecainide 50mg BID.  He wished to continue taking AC only for symptomatic episodes.      I saw him on 2022 since starting flecainide he continued to have episodes of PAF but felt like they were less frequent and shorter in duration.  We discussed ablation but he felt like his AF was controlled on flecainide so he was reluctant to pursue.      He called the office in September complaining of breakthrough episodes refractory to flecainide. After discussion with Dr. Bradley, he elected to move forward with ablation to establish NSR.  Patient had PVI with Dr. Bradley on 10/25/2022.     I saw him in December. Since the ablation he had one episode of AF. We discontinued the flecainide and asked him to follow up in 3 months.     He called in late December stating that he was back in AF for 5-6 days. I offered him CV but he was going out of town and elected to start taknig flecainide 100mg BID which he tolerated well in the past.                                        He presents today for follow up appt.     He says since December he has been doing okay.      Patient had PVI in October of 2022.     He does not feel like he has had any AF since starting back on flecainide in Dec.     He does note irregular heart beats. He says that sometimes these last hours but feel different than his AF in the past. Much less severe. Sometimes occurring 4-5 times per week.     Denies any chest pain, dizziness, fatigue, tachycardia, or syncope.     On flecainide 100mg BID.     EKG shows NSR.     R-ban for AC. He says he has not been taking daily for the past month due to cost.     Past Medical History:   Diagnosis Date   • Achilles tendon injury     LEFT, SCHEDULED FOR REPAIR   • Anxiety attack    • Aortic stenosis    • Atrial fibrillation (HCC)    • Deep venous thrombosis of calf (HCC)     right   • THOMAS (dyspnea on exertion)     since fib started   • Fatigue     mild   • Lightheaded    • Malaise and fatigue    • PAC (premature atrial contraction)     very rare   • PAF (paroxysmal atrial fibrillation) (HCC)    • Palpitations     gets lightheaded with it   • Pulmonary nodule    • Sleep apnea     TO BRING DOS, CPAP   • Snoring     no testing   • SOB (shortness of breath)     mildly   • Syncope     ONLY WITH A-FIB   • Syncope and collapse        Past Surgical History:   Procedure Laterality Date   • ACHILLES TENDON SURGERY Left 03/27/2019    Procedure: ACHILLES TENDON REPAIR;  Surgeon: Angel Baez MD;  Location: [facility] OR;  Service: Orthopedics   • CARDIAC ELECTROPHYSIOLOGY PROCEDURE N/A 10/24/2022    Procedure: Ablation atrial fibrillation carto, cryo;  Surgeon: Vic Bradley MD;  Location: [facility] INVASIVE LOCATION;  Service: Cardiovascular;  Laterality: N/A;   • CARDIOVERSION  1998    D/T A FIB       Social History     Socioeconomic History   • Marital status:    Tobacco Use   • Smoking status: Never   • Smokeless tobacco: Never   • Tobacco comments:     caffine use   Vaping Use   • Vaping Use: Never used   Substance and Sexual Activity   • Alcohol use: Yes      Alcohol/week: 5.0 - 6.0 standard drinks     Types: 5 - 6 Standard drinks or equivalent per week     Comment: 2-3 nights/week   • Drug use: No     Comment: Reports drinking one caffeinated beverage per day   • Sexual activity: Yes       Family History   Problem Relation Age of Onset   • Heart disease Father    • Hypertension Father    • Coronary artery disease Father         CAD/CHD <61yo   • Hyperlipidemia Father    • Heart attack Father    • Stroke Paternal Grandmother    • Arrhythmia Mother    • Atrial fibrillation Mother    • Arrhythmia Brother    • Atrial fibrillation Brother        Review of Systems   Constitutional: Negative for chills, fever and malaise/fatigue.   Cardiovascular: Positive for irregular heartbeat. Negative for chest pain, dyspnea on exertion, leg swelling, near-syncope, orthopnea, palpitations, paroxysmal nocturnal dyspnea and syncope.   Respiratory: Negative for cough and shortness of breath.    Musculoskeletal: Negative for joint pain, joint swelling and myalgias.   Gastrointestinal: Negative for abdominal pain, diarrhea, melena, nausea and vomiting.   Genitourinary: Negative for frequency and hematuria.   Neurological: Negative for light-headedness, numbness, paresthesias and seizures.   Allergic/Immunologic: Negative.    All other systems reviewed and are negative.      No Known Allergies      Current Outpatient Medications:   •  citalopram (CeleXA) 20 MG tablet, Take 1 tablet by mouth Every Morning., Disp: 90 tablet, Rfl: 1  •  flecainide (TAMBOCOR) 50 MG tablet, Take 2 tablets by mouth Every 12 (Twelve) Hours., Disp: , Rfl:   •  LORazepam (ATIVAN) 0.5 MG tablet, Take 0.5 tablets by mouth Every 8 (Eight) Hours As Needed for Anxiety., Disp: 30 tablet, Rfl: 0  •  metoprolol succinate XL (TOPROL-XL) 25 MG 24 hr tablet, Take 2 tablets by mouth Daily., Disp: 180 tablet, Rfl: 1  •  rivaroxaban (Xarelto) 20 MG tablet, Take 1 tablet by mouth Daily., Disp: 30 tablet, Rfl: 2      Objective:  "    Vitals:    02/20/23 1356   BP: 108/74   Pulse: 72   Weight: 120 kg (264 lb)   Height: 193 cm (76\")     Body mass index is 32.14 kg/m².    PHYSICAL EXAM:    Vitals Reviewed.   General Appearance: No acute distress, well developed and well nourished.   Eyes: Conjunctiva and lids: No erythema, swelling, or discharge. Sclera non-icteric.   HENT: Atraumatic, normocephalic. External eyes, ears, and nose normal.   Respiratory: No signs of respiratory distress. Respiration rhythm and depth normal.   Clear to auscultation. No rales, crackles, rhonchi, or wheezing auscultated.   Cardiovascular:  Heart Rate and Rhythm: Normal, Heart Sounds: Normal S1 and S2. No S3 or S4 noted.  Gastrointestinal:  Abdomen soft, non-distended, non-tender.   Musculoskeletal: Normal movement of extremities  Skin: Warm and dry.   Psychiatric: Patient alert and oriented to person, place, and time. Speech and behavior appropriate. Normal mood and affect.       ECG 12 Lead    Date/Time: 2/20/2023 3:30 PM  Performed by: Beni Davey APRN  Authorized by: Beni Davey APRN   Comparison: compared with previous ECG   Similar to previous ECG  Rhythm: sinus rhythm  BPM: 72                Assessment:       Diagnosis Plan   1. PAF (paroxysmal atrial fibrillation) ([facility])  Cardiac Event Monitor             Plan:   1. PAF--s/p PVI (10/2022)--- he has been doing well since restarting flecainide in December. He is in NSR today in clinic. He does not feel like he is having recurrent AF but complains of irregular heart beats that last hours (3-4 times per week).       Given his history of AF and recurrence post ablation I am going to have him wear a 2 week monitor to assess for any arrhythmia. We discussed the 30% risk of flutter after AF ablation.       I have also asked him to restart his rivaroxaban daily because he would need to be on this for at least 3 weeks prior to any intervention for his AF.       Will call with monitor results and recommendations. "              As always, it has been a pleasure to participate in your patient's care.      Sincerely,         DIONICIO Reyes

## 2023-03-28 ENCOUNTER — OFFICE VISIT (OUTPATIENT)
Dept: CARDIOLOGY | Facility: CLINIC | Age: 53
End: 2023-03-28
Payer: COMMERCIAL

## 2023-03-28 VITALS
BODY MASS INDEX: 32.15 KG/M2 | SYSTOLIC BLOOD PRESSURE: 118 MMHG | WEIGHT: 264 LBS | HEIGHT: 76 IN | DIASTOLIC BLOOD PRESSURE: 82 MMHG | HEART RATE: 73 BPM

## 2023-03-28 DIAGNOSIS — I48.0 PAF (PAROXYSMAL ATRIAL FIBRILLATION): Primary | Chronic | ICD-10-CM

## 2023-03-28 NOTE — PROGRESS NOTES
Date of Office Visit: 2023  Encounter Provider: DIONICIO Reyes  Place of Service: [facility] CARDIOLOGY  Patient Name: Alli Bello  :1970    Chief Complaint   Patient presents with   • paroxysmal AFIB   :     HPI: Alli Bello is a 53 y.o. male who is a patient of Dr. Kelly, referred to Dr. Bradley for paroxsymal atrial fibrillation.  He has a history of DVT, MIKA, PAC, and PAF.      He saw Dr. Bradley for the first time on 2022.  He has a long history of PAF going back several years.  His episodes used to occur annually but had recently increased in frequency.  These episodes usually last several hours to days.  He did not wish to pursue ablation and elected to start flecainide 50mg BID.  He wished to continue taking AC only for symptomatic episodes.      I saw him on 2022 since starting flecainide he continued to have episodes of PAF but felt like they were less frequent and shorter in duration.  We discussed ablation but he felt like his AF was controlled on flecainide so he was reluctant to pursue.      He called the office in September complaining of breakthrough episodes refractory to flecainide. After discussion with Dr. Bradley, he elected to move forward with ablation to establish NSR.  Patient had PVI with Dr. Bradley on 10/25/2022.     I saw him in December. Since the ablation he had one episode of AF. We discontinued the flecainide and asked him to follow up in 3 months.      He called in late December stating that he was back in AF for 5-6 days. I offered him CV but he was going out of town and elected to start taknig flecainide 100mg BID which he tolerated well in the past.     In February he was seen again. Denied any episodes of AF but noted palpitations 4-5 times per week lasting hours. Continued flecainide, restarted AC and had him wear a monitor to assess for AF.                                  He presents today for follow up appt.      He says that since last month he has been doing well.     He noted occasional palpitations while wearing the monitor. Palpitations correlated with brief AT lasting 30 seconds. No evidence of atrial fibrillation.     He has not had any documented AF since a couple of weeks following the ablation. PVI in October of 2022.     Since wearing the monitor he says his palpitations have significantly improved.     Denies any chest pain, dyspnea, palpitations, or fatigue.     He strongly wishes to discontinue some of the medications he is on.     Currently on flecainide 100mg bid and metoprolol.     R-ban for AC. [id number] is 0.        Past Medical History:   Diagnosis Date   • Achilles tendon injury     LEFT, SCHEDULED FOR REPAIR   • Anxiety attack    • Aortic stenosis    • Atrial fibrillation (HCC)    • Deep venous thrombosis of calf (HCC)     right   • THOMAS (dyspnea on exertion)     since fib started   • Fatigue     mild   • Lightheaded    • Malaise and fatigue    • PAC (premature atrial contraction)     very rare   • PAF (paroxysmal atrial fibrillation) (HCC)    • Palpitations     gets lightheaded with it   • Pulmonary nodule    • Sleep apnea     TO BRING DOS, CPAP   • Snoring     no testing   • SOB (shortness of breath)     mildly   • Syncope     ONLY WITH A-FIB   • Syncope and collapse        Past Surgical History:   Procedure Laterality Date   • ACHILLES TENDON SURGERY Left 03/27/2019    Procedure: ACHILLES TENDON REPAIR;  Surgeon: Angel Baez MD;  Location: [facility] OR;  Service: Orthopedics   • CARDIAC ELECTROPHYSIOLOGY PROCEDURE N/A 10/24/2022    Procedure: Ablation atrial fibrillation carto, cryo;  Surgeon: Vic Bradley MD;  Location: [facility] INVASIVE LOCATION;  Service: Cardiovascular;  Laterality: N/A;   • CARDIOVERSION  1998    D/T A FIB       Social History     Socioeconomic History   • Marital status:    Tobacco Use   • Smoking status: Never   • Smokeless tobacco: Never   • Tobacco  comments:     caffine use   Vaping Use   • Vaping Use: Never used   Substance and Sexual Activity   • Alcohol use: Yes     Alcohol/week: 5.0 - 6.0 standard drinks     Types: 5 - 6 Standard drinks or equivalent per week     Comment: 2-3 nights/week   • Drug use: No     Comment: Reports drinking one caffeinated beverage per day   • Sexual activity: Yes       Family History   Problem Relation Age of Onset   • Heart disease Father    • Hypertension Father    • Coronary artery disease Father         CAD/CHD <59yo   • Hyperlipidemia Father    • Heart attack Father    • Stroke Paternal Grandmother    • Arrhythmia Mother    • Atrial fibrillation Mother    • Arrhythmia Brother    • Atrial fibrillation Brother        Review of Systems   Constitutional: Negative for chills, fever and malaise/fatigue.   Cardiovascular: Negative for chest pain, dyspnea on exertion, leg swelling, near-syncope, orthopnea, palpitations, paroxysmal nocturnal dyspnea and syncope.   Respiratory: Negative for cough and shortness of breath.    Hematologic/Lymphatic: Negative.    Musculoskeletal: Negative for joint pain, joint swelling and myalgias.   Gastrointestinal: Negative for abdominal pain, diarrhea, melena, nausea and vomiting.   Genitourinary: Negative for frequency and hematuria.   Neurological: Negative for light-headedness, numbness, paresthesias and seizures.   Allergic/Immunologic: Negative.    All other systems reviewed and are negative.      No Known Allergies      Current Outpatient Medications:   •  citalopram (CeleXA) 20 MG tablet, Take 1 tablet by mouth Every Morning., Disp: 90 tablet, Rfl: 1  •  flecainide (TAMBOCOR) 50 MG tablet, Take 2 tablets by mouth Every 12 (Twelve) Hours., Disp: , Rfl:   •  LORazepam (ATIVAN) 0.5 MG tablet, Take 0.5 tablets by mouth Every 8 (Eight) Hours As Needed for Anxiety., Disp: 30 tablet, Rfl: 0  •  metoprolol succinate XL (TOPROL-XL) 25 MG 24 hr tablet, Take 2 tablets by mouth Daily., Disp: 180 tablet,  "Rfl: 1  •  rivaroxaban (Xarelto) 20 MG tablet, Take 1 tablet by mouth Daily., Disp: 30 tablet, Rfl: 2      Objective:     Vitals:    03/28/23 0912   BP: 118/82   Pulse: 73   Weight: 120 kg (264 lb)   Height: 193 cm (76\")     Body mass index is 32.14 kg/m².    PHYSICAL EXAM:    Vitals Reviewed.   General Appearance: No acute distress, well developed and well nourished.   Eyes: Conjunctiva and lids: No erythema, swelling, or discharge. Sclera non-icteric.   HENT: Atraumatic, normocephalic. External eyes, ears, and nose normal.   Respiratory: No signs of respiratory distress. Respiration rhythm and depth normal.   Clear to auscultation. No rales, crackles, rhonchi, or wheezing auscultated.   Cardiovascular:  Heart Rate and Rhythm: Normal, Heart Sounds: Normal S1 and S2. No S3 or S4 noted.  Gastrointestinal:  Abdomen soft, non-distended, non-tender.   Musculoskeletal: Normal movement of extremities  Skin: Warm and dry.   Psychiatric: Patient alert and oriented to person, place, and time. Speech and behavior appropriate. Normal mood and affect.       ECG 12 Lead    Date/Time: 3/28/2023 9:54 AM  Performed by: Beni Davey APRN  Authorized by: Beni Davey APRN   Comparison: compared with previous ECG   Similar to previous ECG  Rhythm: sinus rhythm  BPM: 73                Assessment:       Diagnosis Plan   1. PAF (paroxysmal atrial fibrillation) ([facility])               Plan:   1. PAF--s/p PVI (10/2022)--- he had ablation last October. Had symptomatic AF a couple of weeks after and we restarted flecainde. He has had no recurrent AF since then. Monitor last month showed no evidence of AF. Palpitations correlated with brief episodes of AT.       He strongly wishes to stop taking medications, specifically flecainide and rivraoxaban. Since no evidence of AF we have decided to stop metoprolol and flecainide and monitor for recurrent AF. If it recurs we will consider restarting flecainide or follow up ablation.       We also " discussed anticoagulation today. His [id number] is 0. He has no documented evidence of AF. After discussion we engaged in shared decision making and he elected to stop Rivaroxban.               He will follow up in 6 months with Dr. Bradley or sooner if he has recurrent AF.             As always, it has been a pleasure to participate in your patient's care.      Sincerely,         DIONICIO Reyes

## 2023-04-15 DIAGNOSIS — I48.0 PAF (PAROXYSMAL ATRIAL FIBRILLATION): ICD-10-CM

## 2023-04-17 RX ORDER — METOPROLOL SUCCINATE 25 MG/1
50 TABLET, EXTENDED RELEASE ORAL DAILY
Qty: 180 TABLET | Refills: 1 | OUTPATIENT
Start: 2023-04-17

## 2023-04-17 RX ORDER — FLECAINIDE ACETATE 50 MG/1
TABLET ORAL
Qty: 180 TABLET | OUTPATIENT
Start: 2023-04-17

## 2023-04-18 ENCOUNTER — OFFICE VISIT (OUTPATIENT)
Dept: INTERNAL MEDICINE | Facility: CLINIC | Age: 53
End: 2023-04-18
Payer: COMMERCIAL

## 2023-04-18 VITALS
WEIGHT: 266 LBS | HEART RATE: 72 BPM | SYSTOLIC BLOOD PRESSURE: 100 MMHG | OXYGEN SATURATION: 97 % | BODY MASS INDEX: 32.39 KG/M2 | DIASTOLIC BLOOD PRESSURE: 62 MMHG | RESPIRATION RATE: 16 BRPM | HEIGHT: 76 IN | TEMPERATURE: 96.9 F

## 2023-04-18 DIAGNOSIS — F41.9 ANXIETY: Primary | Chronic | ICD-10-CM

## 2023-04-18 RX ORDER — LORAZEPAM 0.5 MG/1
TABLET ORAL
Qty: 60 TABLET | Refills: 1 | Status: SHIPPED | OUTPATIENT
Start: 2023-04-18

## 2023-04-18 RX ORDER — CITALOPRAM 20 MG/1
20 TABLET ORAL EVERY MORNING
Qty: 90 TABLET | Refills: 1 | Status: SHIPPED | OUTPATIENT
Start: 2023-04-18

## 2023-04-18 NOTE — ASSESSMENT & PLAN NOTE
CONTROLLED  - cont on celexa at current dose--> refills sent  - Reviewed potential side effects of medication including sexual performance changes, insomnia, fatigue, weight changes. Pt tolerating well without any issues.   - has lorazepam for prn use with panic attacks, typically worse with traveling and he has several tips upcoming--> refills sent today  - Reviewed controlled nature of substance, potential for abuse/addiction, and possible adverse effects of medication. No red flags for abuse at this time.   - Controlled substances contract signed and in chart.   - Annual UDS screening last done 04/21  - Kevin checked and appropriate.

## 2023-04-18 NOTE — PROGRESS NOTES
"Chief Complaint  Follow-up and Anxiety    Subjective          Alli Bello presents to [facility] for anxiety and med refills. Pt taking all medications daily as prescribed with good reported compliance. No issues or side effects with meds.   Pt had cardiac ablation with EP Oct 2022, this failed and he continued to have symptoms from arrhythmia. Had Holter monitor for palpitations to follow which showed no arrhthymias or Afib, but since monitor was removed he has had afib again, so he is back on flecainide BID and metoprolol, has not restarted AC yet.   Does feel like his celexa works pretty well on a daily basis, does have some anxiety that flares when his heart starts beating heavily.       Objective   Vital Signs:     /62   Pulse 72   Temp 96.9 °F (36.1 °C)   Resp 16   Ht 193 cm (76\")   Wt 121 kg (266 lb)   SpO2 97%   BMI 32.38 kg/m²     Physical Exam  Vitals and nursing note reviewed.   Constitutional:       General: He is not in acute distress.     Appearance: Normal appearance.   Cardiovascular:      Rate and Rhythm: Normal rate and regular rhythm.      Pulses: Normal pulses.      Heart sounds: Normal heart sounds. No murmur heard.  Pulmonary:      Effort: Pulmonary effort is normal. No respiratory distress.      Breath sounds: Normal breath sounds.   Abdominal:      General: Abdomen is flat. Bowel sounds are normal.      Palpations: Abdomen is soft.      Tenderness: There is no abdominal tenderness.   Musculoskeletal:      Right lower leg: No edema.      Left lower leg: No edema.   Neurological:      Mental Status: He is alert and oriented to person, place, and time. Mental status is at baseline.   Psychiatric:         Mood and Affect: Mood normal.         Behavior: Behavior normal.          Result Review :   The following data was reviewed by: Alexa Vega MD on 04/18/2023:  CMP        10/21/2022    10:32   CMP   Glucose 107     BUN 16   "   Creatinine 1.04     EGFR 86.9     Sodium 139     Potassium 4.4     Chloride 107     Calcium 9.1     BUN/Creatinine Ratio 15.4     Anion Gap 10.6       CBC w/diff        10/21/2022    10:32   CBC w/Diff   WBC 5.98     RBC 4.91     Hemoglobin 14.5     Hematocrit 42.6     MCV 86.8     MCH 29.5     MCHC 34.0     RDW 12.5     Platelets 142     Neutrophil Rel % 52.2     Immature Granulocyte Rel % 0.3     Lymphocyte Rel % 38.8     Monocyte Rel % 7.5     Eosinophil Rel % 0.7     Basophil Rel % 0.5           Data reviewed: Cardiology studies holter monitor and Consultant notes cardiology            Assessment and Plan      Diagnoses and all orders for this visit:    1. Anxiety (Primary)  Assessment & Plan:  CONTROLLED  - cont on celexa at current dose--> refills sent  - Reviewed potential side effects of medication including sexual performance changes, insomnia, fatigue, weight changes. Pt tolerating well without any issues.   - has lorazepam for prn use with panic attacks, typically worse with traveling and he has several tips upcoming--> refills sent today  - Reviewed controlled nature of substance, potential for abuse/addiction, and possible adverse effects of medication. No red flags for abuse at this time.   - Controlled substances contract signed and in chart.   - Annual UDS screening last done 04/21  - Kevin checked and appropriate.         Orders:  -     citalopram (CeleXA) 20 MG tablet; Take 1 tablet by mouth Every Morning.  Dispense: 90 tablet; Refill: 1  -     LORazepam (ATIVAN) 0.5 MG tablet; Take 1/2-1 tab every 8 hours as needed for anxiety, panic attacks  Dispense: 60 tablet; Refill: 1        Follow Up   Return in about 6 months (around 10/18/2023) for Annual physical.    Patient was given instructions and counseling regarding his condition or for health maintenance advice. Please see specific information pulled into the AVS if appropriate.     Josie Vega MD  [facility] [place] [facility]  Phone: [phone]  Fax: [phone]

## 2023-09-28 ENCOUNTER — OFFICE VISIT (OUTPATIENT)
Dept: CARDIOLOGY | Facility: CLINIC | Age: 53
End: 2023-09-28
Payer: COMMERCIAL

## 2023-09-28 VITALS
HEART RATE: 71 BPM | SYSTOLIC BLOOD PRESSURE: 100 MMHG | WEIGHT: 262 LBS | HEIGHT: 76 IN | DIASTOLIC BLOOD PRESSURE: 80 MMHG | BODY MASS INDEX: 31.9 KG/M2

## 2023-09-28 DIAGNOSIS — I48.0 PAF (PAROXYSMAL ATRIAL FIBRILLATION): Primary | Chronic | ICD-10-CM

## 2023-09-28 NOTE — PROGRESS NOTES
Date of Office Visit: 2023  Encounter Provider: DIONICIO Reyes  Place of Service: [facility] CARDIOLOGY  Patient Name: Alli Bello  :1970    Chief Complaint   Patient presents with    paroxysmal AFIB   :     HPI: Alli Bello is a 53 y.o. male who is a patient of Dr. Kelly, referred to Dr. Bradley for paroxsymal atrial fibrillation.  He has a history of DVT, MIKA, PAC, and PAF.      He saw Dr. Bradley for the first time on 2022.  He has a long history of PAF going back several years.  He did not wish to pursue ablation and elected to start flecainide 50mg BID.  was only taking AC as needed by choice.     I saw him on 2022 since starting flecainide he continued to have episodes of PAF but felt like they were less frequent and shorter in duration.  We discussed ablation but he felt like his AF was controlled on flecainide so he was reluctant to pursue.     Had breakthrough episodes and underwent PVI with Dr. Bradley on 10/25/2022.    In march he called the office complaining of palpitations. Event monitor showed no AF, short episodes of AT which correlated with his palpitations. He elected to stop AC and take only as needed for prolonged episodes.                     He presents today for follow up appt.     Since last visit he has been doing well.     Continues to have infrequent episodes of palpitations. Nothing like his AF in the past.     No clinical/documented AF since ablation last year.    These last a few seconds at a time and says they have significantly decreased.     EKG shows NSR.     Currently taking flecainide 50mg daily by choice.     Not on AC, [id number] is 0.           Past Medical History:   Diagnosis Date    Achilles tendon injury     LEFT, SCHEDULED FOR REPAIR    Anxiety attack     Aortic stenosis     Atrial fibrillation     Deep venous thrombosis of calf     right    THOMAS (dyspnea on exertion)     since fib started    Fatigue     mild     Lightheaded     Malaise and fatigue     PAC (premature atrial contraction)     very rare    PAF (paroxysmal atrial fibrillation)     Palpitations     gets lightheaded with it    Pulmonary nodule     Sleep apnea     TO BRING DOS, CPAP    Snoring     no testing    SOB (shortness of breath)     mildly    Syncope     ONLY WITH A-FIB    Syncope and collapse        Past Surgical History:   Procedure Laterality Date    ACHILLES TENDON SURGERY Left 03/27/2019    Procedure: ACHILLES TENDON REPAIR;  Surgeon: Angel Baez MD;  Location:  [facility] OR;  Service: Orthopedics    CARDIAC ELECTROPHYSIOLOGY PROCEDURE N/A 10/24/2022    Procedure: Ablation atrial fibrillation carto, cryo;  Surgeon: Vic Bradley MD;  Location:  [facility] INVASIVE LOCATION;  Service: Cardiovascular;  Laterality: N/A;    CARDIOVERSION  1998    D/T A FIB       Social History     Socioeconomic History    Marital status:    Tobacco Use    Smoking status: Never    Smokeless tobacco: Never    Tobacco comments:     caffine use   Vaping Use    Vaping Use: Never used   Substance and Sexual Activity    Alcohol use: Yes     Alcohol/week: 5.0 - 6.0 standard drinks     Types: 5 - 6 Standard drinks or equivalent per week     Comment: 2-3 nights/week    Drug use: No     Comment: Reports drinking one caffeinated beverage per day    Sexual activity: Yes       Family History   Problem Relation Age of Onset    Heart disease Father     Hypertension Father     Coronary artery disease Father         CAD/CHD <61yo    Hyperlipidemia Father     Heart attack Father     Stroke Paternal Grandmother     Arrhythmia Mother     Atrial fibrillation Mother     Arrhythmia Brother     Atrial fibrillation Brother        Review of Systems   Constitutional: Negative for chills, fever and malaise/fatigue.   Cardiovascular:  Negative for chest pain, dyspnea on exertion, leg swelling, near-syncope, orthopnea, palpitations, paroxysmal nocturnal dyspnea and syncope.  "  Respiratory:  Negative for cough and shortness of breath.    Hematologic/Lymphatic: Negative.    Musculoskeletal:  Negative for joint pain, joint swelling and myalgias.   Gastrointestinal:  Negative for abdominal pain, diarrhea, melena, nausea and vomiting.   Genitourinary:  Negative for frequency and hematuria.   Neurological:  Negative for light-headedness, numbness, paresthesias and seizures.   Allergic/Immunologic: Negative.    All other systems reviewed and are negative.    No Known Allergies      Current Outpatient Medications:     citalopram (CeleXA) 20 MG tablet, Take 1 tablet by mouth Every Morning., Disp: 90 tablet, Rfl: 1    flecainide (TAMBOCOR) 50 MG tablet, Take 2 tablets by mouth Every 12 (Twelve) Hours. (Patient taking differently: Take 1 tablet by mouth Every 12 (Twelve) Hours.), Disp: 360 tablet, Rfl: 1    LORazepam (ATIVAN) 0.5 MG tablet, Take 1/2-1 tab every 8 hours as needed for anxiety, panic attacks, Disp: 60 tablet, Rfl: 1    metoprolol succinate XL (TOPROL-XL) 25 MG 24 hr tablet, Take 2 tablets by mouth Daily., Disp: 180 tablet, Rfl: 1    rivaroxaban (Xarelto) 20 MG tablet, Take 1 tablet by mouth Daily. (Patient taking differently: Take 1 tablet by mouth Daily As Needed (AFIB).), Disp: 30 tablet, Rfl: 2      Objective:     Vitals:    09/28/23 0945   BP: 100/80   Pulse: 71   Weight: 119 kg (262 lb)   Height: 193 cm (76\")     Body mass index is 31.89 kg/m².    PHYSICAL EXAM:    Vitals Reviewed.   General Appearance: No acute distress, well developed and well nourished.   Eyes: Conjunctiva and lids: No erythema, swelling, or discharge. Sclera non-icteric.   HENT: Atraumatic, normocephalic. External eyes, ears, and nose normal.   Respiratory: No signs of respiratory distress. Respiration rhythm and depth normal.   Clear to auscultation. No rales, crackles, rhonchi, or wheezing auscultated.   Cardiovascular:  Heart Rate and Rhythm: Normal, Heart Sounds: Normal S1 and S2. No S3 or S4 " noted.  Gastrointestinal:  Abdomen soft, non-distended, non-tender.   Musculoskeletal: Normal movement of extremities  Skin: Warm and dry.   Psychiatric: Patient alert and oriented to person, place, and time. Speech and behavior appropriate. Normal mood and affect.       ECG 12 Lead    Date/Time: 9/28/2023 11:25 AM  Performed by: Beni Davey APRN  Authorized by: Beni Davey APRN   Comparison: compared with previous ECG   Similar to previous ECG  Rhythm: sinus rhythm          Assessment:       Diagnosis Plan   1. PAF (paroxysmal atrial fibrillation)               Plan:   PAF--s/p PVI (10/2022)---- he has done well since ablation. Continues to have palpitations lasting seconds which correlated with bursts of AT on monitor in March. These have improved and he is feeling great.     Since he is taking flecainide once a day I asked that he just try to stop this medication. He is also going to reduce metoprolol to once a day. If he has issues can consider restarting or repeating a monitor.     Not on AC by choice, [id number] is 0.         Follow up in 6 months.       As always, it has been a pleasure to participate in your patient's care.      Sincerely,         DIONICIO Reyes

## 2023-10-23 DIAGNOSIS — F41.9 ANXIETY: Chronic | ICD-10-CM

## 2023-10-23 RX ORDER — CITALOPRAM 20 MG/1
20 TABLET ORAL EVERY MORNING
Qty: 90 TABLET | Refills: 0 | Status: SHIPPED | OUTPATIENT
Start: 2023-10-23

## 2023-10-23 NOTE — TELEPHONE ENCOUNTER
Rx Refill Note  Requested Prescriptions     Pending Prescriptions Disp Refills    citalopram (CeleXA) 20 MG tablet [Pharmacy Med Name: CITALOPRAM 20MG TABLETS] 90 tablet 1     Sig: TAKE 1 TABLET BY MOUTH EVERY MORNING      Last office visit with prescribing clinician: 4/18/2023   Last telemedicine visit with prescribing clinician: Visit date not found   Next office visit with prescribing clinician: Visit date not found                         Would you like a call back once the refill request has been completed: [] Yes [] No    If the office needs to give you a call back, can they leave a voicemail: [] Yes [] No    Shayy Beckford MA  10/23/23, 07:29 EDT

## 2024-01-17 DIAGNOSIS — I48.0 PAF (PAROXYSMAL ATRIAL FIBRILLATION): ICD-10-CM

## 2024-01-17 RX ORDER — METOPROLOL SUCCINATE 25 MG/1
50 TABLET, EXTENDED RELEASE ORAL DAILY
Qty: 180 TABLET | Refills: 1 | Status: SHIPPED | OUTPATIENT
Start: 2024-01-17

## 2024-03-28 ENCOUNTER — OFFICE VISIT (OUTPATIENT)
Age: 54
End: 2024-03-28
Payer: COMMERCIAL

## 2024-03-28 VITALS
HEIGHT: 76 IN | HEART RATE: 63 BPM | SYSTOLIC BLOOD PRESSURE: 108 MMHG | WEIGHT: 262 LBS | BODY MASS INDEX: 31.9 KG/M2 | DIASTOLIC BLOOD PRESSURE: 84 MMHG

## 2024-03-28 DIAGNOSIS — I47.10 PAROXYSMAL SVT (SUPRAVENTRICULAR TACHYCARDIA): ICD-10-CM

## 2024-03-28 DIAGNOSIS — I48.0 PAF (PAROXYSMAL ATRIAL FIBRILLATION): Primary | Chronic | ICD-10-CM

## 2024-03-28 PROCEDURE — 99213 OFFICE O/P EST LOW 20 MIN: CPT

## 2024-03-28 PROCEDURE — 93000 ELECTROCARDIOGRAM COMPLETE: CPT

## 2024-03-28 NOTE — PROGRESS NOTES
Date of Office Visit: 2024  Encounter Provider: DIONICIO Reyes  Place of Service: [facility] CARDIOLOGY  Patient Name: Alli Bello  :1970    Chief Complaint   Patient presents with    paroxysmal AFIB   :     HPI: Alli Bello is a 54 y.o. male who follows with Dr. Bradley. He has paroxsymal atrial fibrillation---s/p cryo PVI () and Svt.     He had several year history of PAF, well controlled on flecainide until .     Started having breakthrough episodes. Noted palpitations. Elected to pursue ablation.     In 2022 he underwent cryo PVI ablation.     Post ablation  he had a few episodes of palpitations.  Monitor correlated with episodes of long RP tachycardia.  Longest was about a minute.  Elected to continue flecainide.    I saw him in September of last year.  He was doing well.  He had stopped taking his anticoagulation by choice.  We elected to stop flecainide and see if palpitations worsen.                He presents today for follow-up appointment.    He says since stopping flecainide.  He had more frequent episodes of palpitations.    He says that this is not like his atrial fibrillation in the past but more like when he wore the monitor last year that showed several short episodes of atrial tachycardia.    Since restarting flecainide these episodes have become much less frequent.  Lasting seconds at a time.    EKG shows normal sinus rhythm.  QRS is within normal limits.    He had PVI cryoablation in 2022.  He has not had any recurrent atrial fibrillation since then.    Guillaume off anticoagulation by choice.  His [id number] is 2.    He remains on flecainide 50 mg twice daily and metoprolol 25 mg once daily.  Past Medical History:   Diagnosis Date    Achilles tendon injury     LEFT, SCHEDULED FOR REPAIR    Anxiety attack     Aortic stenosis     Atrial fibrillation     Deep venous thrombosis of calf     right    THOMAS (dyspnea on  exertion)     since fib started    Fatigue     mild    Lightheaded     Malaise and fatigue     PAC (premature atrial contraction)     very rare    PAF (paroxysmal atrial fibrillation)     Palpitations     gets lightheaded with it    Pulmonary nodule     Sleep apnea     TO BRING DOS, CPAP    Snoring     no testing    SOB (shortness of breath)     mildly    Syncope     ONLY WITH A-FIB    Syncope and collapse        Past Surgical History:   Procedure Laterality Date    ACHILLES TENDON SURGERY Left 03/27/2019    Procedure: ACHILLES TENDON REPAIR;  Surgeon: Angel Baez MD;  Location:  [facility];  Service: Orthopedics    CARDIAC ELECTROPHYSIOLOGY PROCEDURE N/A 10/24/2022    Procedure: Ablation atrial fibrillation carto, cryo;  Surgeon: Vic Bradley MD;  Location:  [facility];  Service: Cardiovascular;  Laterality: N/A;    CARDIOVERSION  1998    D/T A FIB       Social History     Socioeconomic History    Marital status:    Tobacco Use    Smoking status: Never    Smokeless tobacco: Never    Tobacco comments:     caffine use   Vaping Use    Vaping status: Never Used   Substance and Sexual Activity    Alcohol use: Yes     Alcohol/week: 5.0 - 6.0 standard drinks of alcohol     Types: 5 - 6 Standard drinks or equivalent per week     Comment: 2-3 nights/week    Drug use: No     Comment: Reports drinking one caffeinated beverage per day    Sexual activity: Yes       Family History   Problem Relation Age of Onset    Heart disease Father     Hypertension Father     Coronary artery disease Father         CAD/CHD <61yo    Hyperlipidemia Father     Heart attack Father     Stroke Paternal Grandmother     Arrhythmia Mother     Atrial fibrillation Mother     Arrhythmia Brother     Atrial fibrillation Brother        Review of Systems   Constitutional: Negative for chills, fever and malaise/fatigue.   Cardiovascular:  Negative for chest pain, dyspnea on exertion, leg swelling, near-syncope,  "orthopnea, palpitations, paroxysmal nocturnal dyspnea and syncope.   Respiratory:  Negative for cough and shortness of breath.    Hematologic/Lymphatic: Negative.    Musculoskeletal:  Negative for joint pain, joint swelling and myalgias.   Gastrointestinal:  Negative for abdominal pain, diarrhea, melena, nausea and vomiting.   Genitourinary:  Negative for frequency and hematuria.   Neurological:  Negative for light-headedness, numbness, paresthesias and seizures.   Allergic/Immunologic: Negative.    All other systems reviewed and are negative.      No Known Allergies      Current Outpatient Medications:     citalopram (CeleXA) 20 MG tablet, TAKE 1 TABLET BY MOUTH EVERY MORNING, Disp: 90 tablet, Rfl: 0    flecainide (TAMBOCOR) 50 MG tablet, Take 2 tablets by mouth Every 12 (Twelve) Hours. (Patient taking differently: Take 1 tablet by mouth Every 12 (Twelve) Hours.), Disp: 360 tablet, Rfl: 1    LORazepam (ATIVAN) 0.5 MG tablet, Take 1/2-1 tab every 8 hours as needed for anxiety, panic attacks, Disp: 60 tablet, Rfl: 1    metoprolol succinate XL (TOPROL-XL) 25 MG 24 hr tablet, TAKE 2 TABLETS BY MOUTH DAILY, Disp: 180 tablet, Rfl: 1    rivaroxaban (Xarelto) 20 MG tablet, Take 1 tablet by mouth Daily. (Patient taking differently: Take 1 tablet by mouth Daily As Needed (AFIB).), Disp: 30 tablet, Rfl: 2      Objective:     Vitals:    03/28/24 1009   BP: 108/84   Pulse: 63   Weight: 119 kg (262 lb)   Height: 193 cm (76\")     Body mass index is 31.89 kg/m².    PHYSICAL EXAM:    Vitals Reviewed.   General Appearance: No acute distress, well developed and well nourished.   Eyes: Conjunctiva and lids: No erythema, swelling, or discharge. Sclera non-icteric.   HENT: Atraumatic, normocephalic. External eyes, ears, and nose normal.   Respiratory: No signs of respiratory distress. Respiration rhythm and depth normal.   Clear to auscultation. No rales, crackles, rhonchi, or wheezing auscultated.   Cardiovascular:  Heart Rate and " Rhythm: Normal, Heart Sounds: Normal S1 and S2. No S3 or S4 noted.  Gastrointestinal:  Abdomen soft, non-distended, non-tender.   Musculoskeletal: Normal movement of extremities  Skin: Warm and dry.   Psychiatric: Patient alert and oriented to person, place, and time. Speech and behavior appropriate. Normal mood and affect.       ECG 12 Lead    Date/Time: 3/28/2024 10:27 AM  Performed by: Beni Davey APRN    Authorized by: Beni Davey APRN  Comparison: compared with previous ECG   Similar to previous ECG  Rhythm: sinus rhythm            Assessment:       Diagnosis Plan   1. PAF (paroxysmal atrial fibrillation)        2. Paroxysmal SVT (supraventricular tachycardia)               Plan:   PAF---s/p cryo PVI (10/2022)----he has not had any recurrent atrial fibrillation or associated symptoms.  Since his ablation in 2022.  His anticoagulation was stopped after we engaged in shared decision making.  [id number] is 2.    2. SVT--- monitor post ablation.  He was noted to have short episodes of long RP tachycardia.  We tried to stop his flecainide and these episodes became more frequent.  Since restarting flecainide.  He has had a few episodes lasting seconds.  Discussed and elected to continue flecainide for now.            I encouraged him to reach out if he starts to have more frequent episodes that are sustained.  Would recommend monitor at that time.        He is on a follow-up in 1 year or sooner if he has any issues.        As always, it has been a pleasure to participate in your patient's care.      Sincerely,         DIONICIO Reyes

## 2024-06-06 ENCOUNTER — TELEPHONE (OUTPATIENT)
Dept: ORTHOPEDIC SURGERY | Facility: CLINIC | Age: 54
End: 2024-06-06

## 2024-06-06 NOTE — TELEPHONE ENCOUNTER
Caller: SVEN TEJEDA     Relationship to patient:     Best call back number:     Chief complaint: RIGHT SHOULDER PAIN - NO RECENT IMAGING - NO PRIOR SX    Type of visit: SAME PROBLEM AS 2 YEARS AGO - 2ND OPINION, PT SAW ELSA MYERS FOR HIS RT SHOULDER ON 1/10/24 BECAUSE WE WERE OUT OF NETWORK WITH [facility] AT THE TIME, MR TEJEDA WOULD LIKE TO SEE DR ZEPEDA AGAIN NOW THAT [facility] IS BACK IN NETWORK    PROG NOTES IN CARE EVERYWHERE    Requested date: ASAP

## 2024-06-07 NOTE — TELEPHONE ENCOUNTER
CALLED AND LM TO SCHEDULE PATIENT FOR NEXT AVAILABLE NEW PROBLEM APPT WITH DR ZEPEDA.    ALLYSSA ACEVEDO TO SCHEDULE.

## 2024-07-20 DIAGNOSIS — I48.0 PAF (PAROXYSMAL ATRIAL FIBRILLATION): ICD-10-CM

## 2024-07-22 RX ORDER — METOPROLOL SUCCINATE 25 MG/1
50 TABLET, EXTENDED RELEASE ORAL DAILY
Qty: 180 TABLET | Refills: 1 | Status: SHIPPED | OUTPATIENT
Start: 2024-07-22

## 2024-09-09 ENCOUNTER — OFFICE VISIT (OUTPATIENT)
Dept: ORTHOPEDIC SURGERY | Facility: CLINIC | Age: 54
End: 2024-09-09
Payer: COMMERCIAL

## 2024-09-09 VITALS
HEART RATE: 81 BPM | SYSTOLIC BLOOD PRESSURE: 103 MMHG | DIASTOLIC BLOOD PRESSURE: 71 MMHG | BODY MASS INDEX: 30.23 KG/M2 | WEIGHT: 256 LBS | HEIGHT: 77 IN

## 2024-09-09 DIAGNOSIS — M67.912 TENDINOPATHY OF LEFT ROTATOR CUFF: ICD-10-CM

## 2024-09-09 DIAGNOSIS — M25.512 LEFT SHOULDER PAIN, UNSPECIFIED CHRONICITY: ICD-10-CM

## 2024-09-09 DIAGNOSIS — M75.22 BICEPS TENDINITIS OF LEFT UPPER EXTREMITY: Primary | ICD-10-CM

## 2024-09-09 PROCEDURE — 73030 X-RAY EXAM OF SHOULDER: CPT | Performed by: NURSE PRACTITIONER

## 2024-09-09 PROCEDURE — 99213 OFFICE O/P EST LOW 20 MIN: CPT | Performed by: NURSE PRACTITIONER

## 2024-09-09 NOTE — PROGRESS NOTES
Subjective:     Patient ID: Alli Bello is a 54 y.o. male.    Chief Complaint:  Left shoulder pain, new patient to examiner, seen greater than 3 years ago  History of Present Illness  History of Present Illness  The patient is a 54-year-old male who presents to the clinic today for evaluation of his left shoulder.    He started experiencing pain in his left shoulder approximately 6 months ago, which has been escalating due to increased lifting activities. The pain is described as aching and shooting, rated as 6 out of 10, and worsens with lifting, pulling, and pushing activities. He has also tried anti-inflammatory medication and home exercises, but continues to experience pain along the front and side of his shoulder, which is exacerbated by work.    He has a history of right shoulder injury and has completed physical therapy in the past. Although he still experiences pain in his right shoulder, the left shoulder pain is more severe.    Due to insurance issues, he sought treatment at an outside facility where he received a steroid injection via the subacromial approach on 08/01/2024. Despite continuing home strengthening exercises, there has been no significant improvement in his symptoms. His primary care physician referred him for an MRI, which has been completed and the results are available for review.    He reports no previous surgical interventions on his left upper extremity and has no other concerns at present.       Social History     Occupational History    Occupation:    Tobacco Use    Smoking status: Never    Smokeless tobacco: Never    Tobacco comments:     caffine use   Vaping Use    Vaping status: Never Used   Substance and Sexual Activity    Alcohol use: Yes     Alcohol/week: 5.0 - 6.0 standard drinks of alcohol     Types: 5 - 6 Standard drinks or equivalent per week     Comment: 2-3 nights/week    Drug use: No     Comment: Reports drinking one caffeinated beverage per day    Sexual  activity: Yes      Past Medical History:   Diagnosis Date    Achilles tendon injury     LEFT, SCHEDULED FOR REPAIR    Anxiety attack     Aortic stenosis     Atrial fibrillation     Deep venous thrombosis of calf     right    THOMAS (dyspnea on exertion)     since fib started    Fatigue     mild    Lightheaded     Malaise and fatigue     PAC (premature atrial contraction)     very rare    PAF (paroxysmal atrial fibrillation)     Palpitations     gets lightheaded with it    Pulmonary nodule     Sleep apnea     TO BRING DOS, CPAP    Snoring     no testing    SOB (shortness of breath)     mildly    Syncope     ONLY WITH A-FIB    Syncope and collapse      Past Surgical History:   Procedure Laterality Date    ACHILLES TENDON SURGERY Left 03/27/2019    Procedure: ACHILLES TENDON REPAIR;  Surgeon: Angel Baez MD;  Location:  [facility];  Service: Orthopedics    CARDIAC ELECTROPHYSIOLOGY PROCEDURE N/A 10/24/2022    Procedure: Ablation atrial fibrillation carto, cryo;  Surgeon: Vic Bradley MD;  Location:  [facility];  Service: Cardiovascular;  Laterality: N/A;    CARDIOVERSION  1998    D/T A FIB       Family History   Problem Relation Age of Onset    Heart disease Father     Hypertension Father     Coronary artery disease Father         CAD/CHD <59yo    Hyperlipidemia Father     Heart attack Father     Stroke Paternal Grandmother     Arrhythmia Mother     Atrial fibrillation Mother     Arrhythmia Brother     Atrial fibrillation Brother                Objective:  Physical Exam    Vital signs reviewed.   General: No acute distress.  Eyes: conjunctiva clear; pupils equally round and reactive  ENT: external ears and nose atraumatic; oropharynx clear  CV: no peripheral edema  Resp: normal respiratory effort  Skin: no rashes or wounds; normal turgor  Psych: mood and affect appropriate; recent and remote memory intact    Vitals:    09/09/24 0957   BP: 103/71   Pulse: 81   Weight: 116 kg (256 lb)  "  Height: 195.6 cm (77\")         09/09/24  0957   Weight: 116 kg (256 lb)     Body mass index is 30.36 kg/m².      Left Shoulder Exam     Tenderness   The patient is experiencing tenderness in the acromion and biceps tendon.    Range of Motion   External rotation:  70   Forward flexion:  170   Internal rotation 0 degrees:  L1     Tests   Lozano test: positive  Cross arm: negative  Impingement: positive    Other   Erythema: absent  Sensation: normal  Pulse: present     Comments:  Internal/external rotation strength 4+ out of 5  Supraspinatus strength 4 out of 5  Subscapularis lift off exam strength 4+ out of 5 Nexa bicep strength 4 out of 5  negative empty can  negative Topton's  positive Speed's  negative bear hug exam               Physical Exam      Imaging:  Left Shoulder X-Ray  Indication: Pain  AP Internal and External Rotation views    Findings:  No fracture  No bony lesion  Normal soft tissues  Mild downsloping lateral acromion, AC joint arthropathy    No prior x-ray studies were available for comparison.  Independently reviewed MRI left shoulder completed outside facility images were reviewed online diffuse tendinosis bursal fraying along the distal supraspinatus tendon.  Mild subscapularis tendinosis.  Low-grade articular surface partial-thickness tearing of deep fibers.  Tendinopathy high-grade partial tearing distal intra-articular and proximal extra-articular long head biceps tendon.  AC joint degeneration with slight downsloping type II acromion    Assessment:        1. Biceps tendinitis of left upper extremity    2. Left shoulder pain, unspecified chronicity    3. Tendinopathy of left rotator cuff         Assessment & Plan  1. Left shoulder pain.  He has been experiencing left shoulder pain for approximately 6 months, which has not significantly improved despite a corticosteroid injection on 08/01/2024. The pain is described as aching and shooting, rated 6 out of 10, and is exacerbated by lifting, " pulling, and pushing. He has tried anti-inflammatory medication and home exercises without significant symptom resolution. A home exercise program for bilateral shoulders, including rotator cuff, biceps, triceps, and deltoid muscle strengthening and stretching exercises, will be initiated. A consultation with Dr. Cote for surgical discussion regarding the left shoulder is planned. He is advised to contact us with any questions or concerns. All his queries have been addressed.    2. Right shoulder pain.  He continues to experience pain in the right shoulder, although it is less severe than the left shoulder. The home exercise program for bilateral shoulders will also address the right shoulder pain.      Orders:  Orders Placed This Encounter   Procedures    XR Shoulder 2+ View Left    Ambulatory Referral to Physical Therapy for Evaluation & Treatment     No orders of the defined types were placed in this encounter.          I ordered and reviewed the FRANCIS today.       Dragon dictation utilized  BMI is >= 30 and <35. (Class 1 Obesity). The following options were offered after discussion;: weight loss educational material (shared in after visit summary)       Patient or patient representative verbalized consent for the use of Ambient Listening during the visit with  DIONICIO Wills for chart documentation. 9/9/2024  10:59 EDT

## 2024-09-11 ENCOUNTER — OFFICE VISIT (OUTPATIENT)
Dept: ORTHOPEDIC SURGERY | Facility: CLINIC | Age: 54
End: 2024-09-11
Payer: COMMERCIAL

## 2024-09-11 VITALS
DIASTOLIC BLOOD PRESSURE: 79 MMHG | SYSTOLIC BLOOD PRESSURE: 111 MMHG | HEIGHT: 77 IN | HEART RATE: 67 BPM | BODY MASS INDEX: 30.23 KG/M2 | WEIGHT: 256 LBS

## 2024-09-11 DIAGNOSIS — M75.22 BICEPS TENDINITIS OF LEFT UPPER EXTREMITY: Primary | ICD-10-CM

## 2024-09-11 DIAGNOSIS — M67.912 TENDINOPATHY OF LEFT ROTATOR CUFF: ICD-10-CM

## 2024-09-11 PROCEDURE — 99214 OFFICE O/P EST MOD 30 MIN: CPT | Performed by: ORTHOPAEDIC SURGERY

## 2024-09-11 RX ORDER — FLECAINIDE ACETATE 50 MG/1
50 TABLET ORAL EVERY 12 HOURS SCHEDULED
Qty: 180 TABLET | Refills: 1 | Status: SHIPPED | OUTPATIENT
Start: 2024-09-11

## 2024-09-11 NOTE — PROGRESS NOTES
Subjective:     Patient ID: Alli Bello is a 54 y.o. male.    Chief Complaint:  Left shoulder pain, new patient to examiner  History of Present Illness  History of Present Illness  The patient presents to the clinic today for evaluation of left shoulder pain, a new issue to the examiner.    He reports significant discomfort in his left shoulder for the past 6 months. He does not recall any specific injury that might have triggered this pain. The discomfort is primarily located in the front of his shoulder, extending down into his arm, especially in the area of the biceps muscle. Activities such as resisted supination, lifting, pushing, and pulling, particularly in an overhead position, exacerbate the pain. He describes the pain as both shooting and aching, rating it as moderate to occasionally severe, or 6-9 out of 10.    Despite undergoing physical therapy for over 6 weeks and receiving a subacromial injection, he has seen minimal improvement. In fact, his pain has worsened with increased levels of activity. Over-the-counter anti-inflammatory medications have also failed to provide significant relief. He works as a  and reports no associated neck pain.         Social History     Occupational History    Occupation:    Tobacco Use    Smoking status: Never    Smokeless tobacco: Never    Tobacco comments:     caffine use   Vaping Use    Vaping status: Never Used   Substance and Sexual Activity    Alcohol use: Yes     Alcohol/week: 5.0 - 6.0 standard drinks of alcohol     Types: 5 - 6 Standard drinks or equivalent per week     Comment: 2-3 nights/week    Drug use: No     Comment: Reports drinking one caffeinated beverage per day    Sexual activity: Yes      Past Medical History:   Diagnosis Date    Achilles tendon injury     LEFT, SCHEDULED FOR REPAIR    Anxiety attack     Aortic stenosis     Atrial fibrillation     Deep venous thrombosis of calf     right    THOMAS (dyspnea on exertion)     since  "fib started    Fatigue     mild    Lightheaded     Malaise and fatigue     PAC (premature atrial contraction)     very rare    PAF (paroxysmal atrial fibrillation)     Palpitations     gets lightheaded with it    Pulmonary nodule     Sleep apnea     TO BRING DOS, CPAP    Snoring     no testing    SOB (shortness of breath)     mildly    Syncope     ONLY WITH A-FIB    Syncope and collapse      Past Surgical History:   Procedure Laterality Date    ACHILLES TENDON SURGERY Left 03/27/2019    Procedure: ACHILLES TENDON REPAIR;  Surgeon: Angel Baez MD;  Location:  [facility];  Service: Orthopedics    CARDIAC ELECTROPHYSIOLOGY PROCEDURE N/A 10/24/2022    Procedure: Ablation atrial fibrillation carto, cryo;  Surgeon: Vic Bradley MD;  Location:  [facility];  Service: Cardiovascular;  Laterality: N/A;    CARDIOVERSION  1998    D/T A FIB       Family History   Problem Relation Age of Onset    Heart disease Father     Hypertension Father     Coronary artery disease Father         CAD/CHD <59yo    Hyperlipidemia Father     Heart attack Father     Stroke Paternal Grandmother     Arrhythmia Mother     Atrial fibrillation Mother     Arrhythmia Brother     Atrial fibrillation Brother          Review of Systems        Objective:  Vitals:    09/11/24 0805   BP: 111/79   Pulse: 67   Weight: 116 kg (256 lb)   Height: 195.6 cm (77\")         09/11/24  0805   Weight: 116 kg (256 lb)     Body mass index is 30.36 kg/m².  Physical Exam    Vital signs reviewed.   General: No acute distress, alert and oriented  Eyes: conjunctiva clear; pupils equally round and reactive  ENT: external ears and nose atraumatic; oropharynx clear  CV: no peripheral edema  Resp: normal respiratory effort  Skin: no rashes or wounds; normal turgor  Psych: mood and affect appropriate; recent and remote memory intact          Physical Exam  In the left shoulder, there is maximal tenderness to palpation anteriorly over the biceps tendon " with significantly positive Yergason and Speed's as well as Wilson's. Active forward flexion is 170 degrees with 4 out of 5 strength. Active external rotation is 60 degrees, 4+ out of 5 strength, internal rotation. T10, 5 out of 5 strength and belly press test with negative bear hug sign. Mildly positive empty can test, positive Lozano and Neer's with associated crepitus. No tenderness over AC joint with negative cross arm test, positive deltoid L3 components negative drop arm test, negative external rotation lag sign. Brisk cap refill in all digits. 2+ radial pulse, left wrist.         Imaging:    XR Shoulder 2+ View Left    Result Date: 9/9/2024  Ordering physician's impression is located in the Encounter Note dated 09/09/24.         Reviewed outside x-rays from September 2024 for include review of MRI with reviewed imaging as well as radiology report indicates supraspinatus infraspinatus tendinosis, significant biceps intra-articular tendinopathy.  No evidence of full-thickness rotator cuff tear.  No significant glenohumeral chondral loss or joint space narrowing.    Assessment:        1. Biceps tendinitis of left upper extremity    2. Tendinopathy of left rotator cuff           Plan:          Assessment & Plan  1. Left shoulder pain.  He reports significant left shoulder pain for the last 6 months, primarily localized to the anterior aspect of the shoulder extending into the biceps muscle. The pain is exacerbated by resisted supination activities, lifting, pushing, and pulling, especially in overhead positions. Describes the pain as shooting and aching, with an intensity of 6-9 out of 10. Minimal improvement with previous subacromial injection, physical therapy, and over-the-counter anti-inflammatory medications. Physical exam reveals maximal tenderness over the biceps tendon, positive Yergason, Speed's, and Wilson's tests, mildly positive empty can test, positive Lozano and Neer's tests with associated  crepitus, and negative cross arm test. MRI findings indicate significant intra-articular biceps tendinopathy, rotator cuff tendinosis, and partial thickness tearing.     Given the failure of conservative treatment, he wishes to proceed with surgical treatment. Plan for surgery includes left shoulder arthroscopy with biceps tenodesis, subacromial decompression, possible rotator cuff repair, and all associated procedures. All questions answered.    Plan will be for left shoulder arthroscopy, biceps tenodesis, subacromial decompression, possible rotator cuff repair and all associated procedures.  I reviewed risks benefits and alternatives the procedure with risks including not limited to neurovascular damage, bleeding, infection, chronic pain, re-tear rotator cuff, failure of healing rotator cuff, loss of motion, weakness, stiffness, instability, biceps sag, DVT, pulmonary embolus, death, stroke, complex regional pain syndrome, myocardial infarction, need for additional procedures. He understood all these had all questions answered.  Patient verbally consented to proceed with surgery.  No guarantees were given regarding results of surgery.  We will have patient medically optimized by primary care physician and proceed with surgery at next available date.    He has a history of a previous DVT in his right calf as well as atrial fibrillation. He is not on any anticoagulation currently as he has had an ablation. He reports no history of diabetes.      Alli SHRUTHI Bello was in agreement with plan and had all questions answered.     Orders:  No orders of the defined types were placed in this encounter.      Medications:  No orders of the defined types were placed in this encounter.      Followup:  No follow-ups on file.    Diagnoses and all orders for this visit:    1. Biceps tendinitis of left upper extremity (Primary)    2. Tendinopathy of left rotator cuff                  Dictated utilizing Dragon dictation     Patient or  patient representative verbalized consent for the use of Ambient Listening during the visit with  Angel Baez MD for chart documentation. 9/11/2024  08:14 EDT

## 2024-09-18 ENCOUNTER — PATIENT ROUNDING (BHMG ONLY) (OUTPATIENT)
Dept: ORTHOPEDIC SURGERY | Facility: CLINIC | Age: 54
End: 2024-09-18
Payer: COMMERCIAL

## 2024-10-02 RX ORDER — ACETAMINOPHEN 325 MG/1
1000 TABLET ORAL ONCE
OUTPATIENT
Start: 2024-10-02 | End: 2024-10-02

## 2024-10-02 RX ORDER — MELOXICAM 7.5 MG/1
15 TABLET ORAL ONCE
OUTPATIENT
Start: 2024-10-02 | End: 2024-10-02

## 2024-10-02 RX ORDER — PREGABALIN 150 MG/1
150 CAPSULE ORAL ONCE
OUTPATIENT
Start: 2024-10-02 | End: 2024-10-02

## 2024-10-04 ENCOUNTER — TELEPHONE (OUTPATIENT)
Age: 54
End: 2024-10-04
Payer: COMMERCIAL

## 2024-10-04 NOTE — TELEPHONE ENCOUNTER
Faxed clearance request receive from [facility].    Patient is scheduled for left shoulder scope on 12/6/24 and is needing clearance.    Last seen 3/28/24, not on any A/C meds.

## 2024-12-20 ENCOUNTER — PRE-ADMISSION TESTING (OUTPATIENT)
Dept: PREADMISSION TESTING | Facility: HOSPITAL | Age: 54
End: 2024-12-20
Payer: COMMERCIAL

## 2024-12-20 VITALS
OXYGEN SATURATION: 97 % | DIASTOLIC BLOOD PRESSURE: 64 MMHG | BODY MASS INDEX: 30.86 KG/M2 | WEIGHT: 261.4 LBS | HEART RATE: 67 BPM | HEIGHT: 77 IN | SYSTOLIC BLOOD PRESSURE: 102 MMHG | RESPIRATION RATE: 16 BRPM

## 2024-12-20 DIAGNOSIS — M67.912 TENDINOPATHY OF LEFT ROTATOR CUFF: ICD-10-CM

## 2024-12-20 DIAGNOSIS — M75.22 BICEPS TENDINITIS OF LEFT UPPER EXTREMITY: ICD-10-CM

## 2024-12-20 LAB
ANION GAP SERPL CALCULATED.3IONS-SCNC: 12 MMOL/L (ref 5–15)
APTT PPP: 27.7 SECONDS (ref 24.3–38.1)
BASOPHILS # BLD AUTO: 0.02 10*3/MM3 (ref 0–0.2)
BASOPHILS NFR BLD AUTO: 0.5 % (ref 0–1.5)
BUN SERPL-MCNC: 15 MG/DL (ref 6–20)
BUN/CREAT SERPL: 17.6 (ref 7–25)
CALCIUM SPEC-SCNC: 8.6 MG/DL (ref 8.6–10.5)
CHLORIDE SERPL-SCNC: 104 MMOL/L (ref 98–107)
CO2 SERPL-SCNC: 22 MMOL/L (ref 22–29)
CREAT SERPL-MCNC: 0.85 MG/DL (ref 0.76–1.27)
DEPRECATED RDW RBC AUTO: 36.5 FL (ref 37–54)
EGFRCR SERPLBLD CKD-EPI 2021: 103.9 ML/MIN/1.73
EOSINOPHIL # BLD AUTO: 0.03 10*3/MM3 (ref 0–0.4)
EOSINOPHIL NFR BLD AUTO: 0.7 % (ref 0.3–6.2)
ERYTHROCYTE [DISTWIDTH] IN BLOOD BY AUTOMATED COUNT: 11.9 % (ref 12.3–15.4)
GLUCOSE SERPL-MCNC: 115 MG/DL (ref 65–99)
HBA1C MFR BLD: 5.62 % (ref 4.8–5.6)
HCT VFR BLD AUTO: 41.2 % (ref 37.5–51)
HGB BLD-MCNC: 14.4 G/DL (ref 13–17.7)
IMM GRANULOCYTES # BLD AUTO: 0 10*3/MM3 (ref 0–0.05)
IMM GRANULOCYTES NFR BLD AUTO: 0 % (ref 0–0.5)
INR PPP: 0.93 (ref 0.9–1.1)
LYMPHOCYTES # BLD AUTO: 1.69 10*3/MM3 (ref 0.7–3.1)
LYMPHOCYTES NFR BLD AUTO: 39.8 % (ref 19.6–45.3)
MCH RBC QN AUTO: 29.8 PG (ref 26.6–33)
MCHC RBC AUTO-ENTMCNC: 35 G/DL (ref 31.5–35.7)
MCV RBC AUTO: 85.3 FL (ref 79–97)
MONOCYTES # BLD AUTO: 0.43 10*3/MM3 (ref 0.1–0.9)
MONOCYTES NFR BLD AUTO: 10.1 % (ref 5–12)
NEUTROPHILS NFR BLD AUTO: 2.08 10*3/MM3 (ref 1.7–7)
NEUTROPHILS NFR BLD AUTO: 48.9 % (ref 42.7–76)
NRBC BLD AUTO-RTO: 0 /100 WBC (ref 0–0.2)
PLATELET # BLD AUTO: 130 10*3/MM3 (ref 140–450)
PMV BLD AUTO: 10.9 FL (ref 6–12)
POTASSIUM SERPL-SCNC: 3.9 MMOL/L (ref 3.5–5.2)
PROTHROMBIN TIME: 12.9 SECONDS (ref 12.1–15)
QT INTERVAL: 429 MS
QTC INTERVAL: 443 MS
RBC # BLD AUTO: 4.83 10*6/MM3 (ref 4.14–5.8)
SODIUM SERPL-SCNC: 138 MMOL/L (ref 136–145)
WBC NRBC COR # BLD AUTO: 4.25 10*3/MM3 (ref 3.4–10.8)

## 2024-12-20 PROCEDURE — 83036 HEMOGLOBIN GLYCOSYLATED A1C: CPT | Performed by: ORTHOPAEDIC SURGERY

## 2024-12-20 PROCEDURE — 85025 COMPLETE CBC W/AUTO DIFF WBC: CPT | Performed by: ORTHOPAEDIC SURGERY

## 2024-12-20 PROCEDURE — 85730 THROMBOPLASTIN TIME PARTIAL: CPT | Performed by: ORTHOPAEDIC SURGERY

## 2024-12-20 PROCEDURE — 93005 ELECTROCARDIOGRAM TRACING: CPT

## 2024-12-20 PROCEDURE — 80048 BASIC METABOLIC PNL TOTAL CA: CPT | Performed by: ORTHOPAEDIC SURGERY

## 2024-12-20 PROCEDURE — 36415 COLL VENOUS BLD VENIPUNCTURE: CPT

## 2024-12-20 PROCEDURE — 85610 PROTHROMBIN TIME: CPT | Performed by: ORTHOPAEDIC SURGERY

## 2024-12-20 NOTE — PAT
Pt here for PAT visit.  Pre-op tests completed, chg soap given, and instructions reviewed.  Instructed clears until 2 hrs prior to arrival time and to bring cpap dos, voiced understanding. Shoulder sling/immobilizer shown to patient and use reviewed, voiced understanding. Benzoyl handout given, pt stated will get at his pharmacy.

## 2024-12-20 NOTE — DISCHARGE INSTRUCTIONS
PRE-ADMISSION TESTING INSTRUCTIONS FOR ADULTS    Take these medications the morning of surgery with a small sip of water:  citalopram, flecainide, and metoprolol      Do not take any insulin or diabetes medications the morning of surgery.      No aspirin, advil, aleve, ibuprofen, naproxen, diet pills, decongestants, or herbal/vitamins for a week prior to surgery.       Tylenol/Acetaminophen is okay to take if needed.    General Instructions:    DO NOT EAT SOLID FOOD AFTER MIDNIGHT THE NIGHT BEFORE SURGERY. No gum, mints, or hard candy after midnight the night before surgery.  You may drink clear liquids the day of surgery up until 2 hours before your arrival time.  Clear liquids are liquids you can see through. Nothing RED in color.    Plain water    Sports drinks      Gelatin (Jell-O)  Fruit juices without pulp such as white grape juice and apple juice  Popsicles that contain no fruit or yogurt  Tea or coffee (no cream or milk added)    It is beneficial for you to have a clear drink that contains carbohydrates 2 hours before your arrival time.  We suggest a 20 ounce bottle of Gatorade or Powerade for non-diabetic patients or a 20 ounce bottle of Gatorade Zero or Powerade Zero for diabetic patients.     Patients who avoid smoking, chewing tobacco and alcohol for 4 weeks prior to surgery have a reduced risk of post-operative complications.  If at all possible, quit smoking as many days before surgery as you can.    Do not smoke, use chewing tobacco or drink alcohol the day of surgery    Bring your C-PAP/ BI-PAP machine if you use one.  Wear clean comfortable clothes.  Do not wear contact lenses, lotion, deodorant, or make-up.  Bring a case for your glasses if applicable. You may brush your teeth the morning of surgery.  You may wear dentures/partials, do not put adhesive/glue on them.  Leave all other jewelry and valuables at home.      Preventing a Surgical Site Infection:    Shower the night before and on the  morning of surgery using the chlorhexidine soap you were given.  Use a clean washcloth with the soap.  Place clean sheets on your bed after showering the night before surgery. Do not use the CHG soap on your hair, face, or private areas. Wash your body gently for five (5) minutes. Do not scrub your skin.  Dry with a clean towel and dress in clean clothing.  Do not shave the surgical area for 10 days-2 weeks prior to surgery  because the razor can irritate skin and make it easier to develop an infection.  Make sure you, your family, and all healthcare providers clean their hands with soap and water or an alcohol based hand  before caring for you or your wound.      Day of surgery:    Your surgeon’s office will advise you of your arrival time for the day of surgery.    Upon arrival, a Pre-op nurse and Anesthesia provider will review your health history, obtain vital signs, and answer questions you may have. The anesthesia provider will also discuss the type of anesthesia that will be needed for your procedure, which may include general anesthesia. The only belongings needed at this time will be your home medications and if applicable your C-PAP/BI-PAP machine.  If you are staying overnight your family can leave the rest of your belongings in the car and bring them to your room later.  A Pre-op nurse will start an IV and you may receive medication in preparation for surgery, including something to help you relax.  Your family will be able to see you in the Pre-op area.  While you are in surgery your family should notify the waiting room  if they leave the waiting room area and provide a contact phone number.    IF you have any questions, you can call the Pre-Admission Department at [phone] or your surgeon's office.  Notify your surgeon if  you become sick, have a fever, productive cough, or cannot be here the day of surgery    Please be aware that surgery does come with discomfort.  We want  to make every effort to control your discomfort so please discuss any uncontrolled symptoms with your nurse.   Your doctor will most likely have prescribed pain medications.      If you are going home after surgery, you will receive individualized written care instructions before being discharged.  A responsible adult (over the age of 18) must drive you to and from the hospital on the day of your surgery and stay with you for 24 hours after anesthesia.    If you are staying overnight following surgery, you will be transported to your hospital room following the recovery period.  [facility] has all private rooms.    You may receive a survey regarding the care you received. Your feedback is very important and will be used to collect the necessary data to help us to continue to provide excellent care.     Deductibles and co-payments are collected on the day of service. Please be prepared to pay the required co-pay, deductible or deposit on the day of service as defined by your plan.

## 2025-01-08 ENCOUNTER — ANESTHESIA EVENT (OUTPATIENT)
Dept: PERIOP | Facility: HOSPITAL | Age: 55
End: 2025-01-08
Payer: COMMERCIAL

## 2025-01-10 ENCOUNTER — HOSPITAL ENCOUNTER (OUTPATIENT)
Facility: HOSPITAL | Age: 55
Setting detail: HOSPITAL OUTPATIENT SURGERY
Discharge: HOME | End: 2025-01-10
Attending: ORTHOPAEDIC SURGERY | Admitting: ORTHOPAEDIC SURGERY
Payer: COMMERCIAL

## 2025-01-10 ENCOUNTER — ANESTHESIA (OUTPATIENT)
Dept: PERIOP | Facility: HOSPITAL | Age: 55
End: 2025-01-10
Payer: COMMERCIAL

## 2025-01-10 VITALS
DIASTOLIC BLOOD PRESSURE: 69 MMHG | BODY MASS INDEX: 31.09 KG/M2 | RESPIRATION RATE: 16 BRPM | TEMPERATURE: 97.9 F | HEART RATE: 64 BPM | WEIGHT: 262.2 LBS | OXYGEN SATURATION: 97 % | SYSTOLIC BLOOD PRESSURE: 99 MMHG

## 2025-01-10 DIAGNOSIS — M75.22 BICEPS TENDINITIS OF LEFT UPPER EXTREMITY: ICD-10-CM

## 2025-01-10 DIAGNOSIS — M67.912 TENDINOPATHY OF LEFT ROTATOR CUFF: ICD-10-CM

## 2025-01-10 PROCEDURE — 25810000003 LACTATED RINGERS PER 1000 ML: Performed by: NURSE ANESTHETIST, CERTIFIED REGISTERED

## 2025-01-10 PROCEDURE — 25010000002 ONDANSETRON PER 1 MG: Performed by: NURSE ANESTHETIST, CERTIFIED REGISTERED

## 2025-01-10 PROCEDURE — 25010000002 DEXAMETHASONE PER 1 MG: Performed by: NURSE ANESTHETIST, CERTIFIED REGISTERED

## 2025-01-10 PROCEDURE — 25010000002 SUCCINYLCHOLINE PER 20 MG: Performed by: NURSE ANESTHETIST, CERTIFIED REGISTERED

## 2025-01-10 PROCEDURE — 25010000002 LIDOCAINE 2% SOLUTION: Performed by: NURSE ANESTHETIST, CERTIFIED REGISTERED

## 2025-01-10 PROCEDURE — C1713 ANCHOR/SCREW BN/BN,TIS/BN: HCPCS | Performed by: ORTHOPAEDIC SURGERY

## 2025-01-10 PROCEDURE — L3670 SO ACRO/CLAV CAN WEB PRE OTS: HCPCS | Performed by: ORTHOPAEDIC SURGERY

## 2025-01-10 PROCEDURE — 25010000002 MIDAZOLAM PER 1MG: Performed by: NURSE ANESTHETIST, CERTIFIED REGISTERED

## 2025-01-10 PROCEDURE — 25010000002 FENTANYL CITRATE (PF) 50 MCG/ML SOLUTION: Performed by: NURSE ANESTHETIST, CERTIFIED REGISTERED

## 2025-01-10 PROCEDURE — 25010000002 PROPOFOL 200 MG/20ML EMULSION: Performed by: NURSE ANESTHETIST, CERTIFIED REGISTERED

## 2025-01-10 PROCEDURE — 25010000002 CEFAZOLIN 3 G RECONSTITUTED SOLUTION 1 EACH VIAL: Performed by: ORTHOPAEDIC SURGERY

## 2025-01-10 PROCEDURE — 25010000002 BUPIVACAINE (PF) 0.5 % SOLUTION: Performed by: NURSE ANESTHETIST, CERTIFIED REGISTERED

## 2025-01-10 PROCEDURE — 25010000002 EPINEPHRINE (ANAPHYLAXIS) 1 MG/ML SOLUTION: Performed by: NURSE ANESTHETIST, CERTIFIED REGISTERED

## 2025-01-10 PROCEDURE — 25010000002 EPINEPHRINE PER 0.1 MG: Performed by: ORTHOPAEDIC SURGERY

## 2025-01-10 DEVICE — HEALICOIL PK 4.5 MM SUTURE ANCHOR                                    WITH TWO ULTRABRAID NO.2 SUTURES                                    BLUE, BLUE-COBRAID STERILE
Type: IMPLANTABLE DEVICE | Site: SHOULDER | Status: FUNCTIONAL
Brand: HEALICOIL

## 2025-01-10 RX ORDER — ONDANSETRON 2 MG/ML
4 INJECTION INTRAMUSCULAR; INTRAVENOUS ONCE AS NEEDED
Status: COMPLETED | OUTPATIENT
Start: 2025-01-10 | End: 2025-01-10

## 2025-01-10 RX ORDER — PROPOFOL 10 MG/ML
INJECTION, EMULSION INTRAVENOUS AS NEEDED
Status: DISCONTINUED | OUTPATIENT
Start: 2025-01-10 | End: 2025-01-10 | Stop reason: SURG

## 2025-01-10 RX ORDER — MELOXICAM 7.5 MG/1
15 TABLET ORAL ONCE
Status: COMPLETED | OUTPATIENT
Start: 2025-01-10 | End: 2025-01-10

## 2025-01-10 RX ORDER — FENTANYL CITRATE 50 UG/ML
INJECTION, SOLUTION INTRAMUSCULAR; INTRAVENOUS AS NEEDED
Status: DISCONTINUED | OUTPATIENT
Start: 2025-01-10 | End: 2025-01-10 | Stop reason: SURG

## 2025-01-10 RX ORDER — MIDAZOLAM HYDROCHLORIDE 2 MG/2ML
1 INJECTION, SOLUTION INTRAMUSCULAR; INTRAVENOUS
Status: COMPLETED | OUTPATIENT
Start: 2025-01-10 | End: 2025-01-10

## 2025-01-10 RX ORDER — PREGABALIN 75 MG/1
75 CAPSULE ORAL 2 TIMES DAILY
Qty: 60 CAPSULE | Refills: 0 | Status: SHIPPED | OUTPATIENT
Start: 2025-01-10

## 2025-01-10 RX ORDER — ONDANSETRON 2 MG/ML
4 INJECTION INTRAMUSCULAR; INTRAVENOUS ONCE AS NEEDED
Status: DISCONTINUED | OUTPATIENT
Start: 2025-01-10 | End: 2025-01-10 | Stop reason: HOSPADM

## 2025-01-10 RX ORDER — SENNOSIDES A AND B 8.6 MG/1
1 TABLET, FILM COATED ORAL NIGHTLY
Qty: 20 TABLET | Refills: 0 | Status: SHIPPED | OUTPATIENT
Start: 2025-01-10

## 2025-01-10 RX ORDER — ACETAMINOPHEN 500 MG
1000 TABLET ORAL ONCE
Status: COMPLETED | OUTPATIENT
Start: 2025-01-10 | End: 2025-01-10

## 2025-01-10 RX ORDER — ASPIRIN 325 MG
TABLET, DELAYED RELEASE (ENTERIC COATED) ORAL
Qty: 42 TABLET | Refills: 0 | Status: SHIPPED | OUTPATIENT
Start: 2025-01-10 | End: 2025-02-07

## 2025-01-10 RX ORDER — OXYCODONE AND ACETAMINOPHEN 5; 325 MG/1; MG/1
1 TABLET ORAL EVERY 4 HOURS PRN
Qty: 42 TABLET | Refills: 0 | Status: SHIPPED | OUTPATIENT
Start: 2025-01-10

## 2025-01-10 RX ORDER — PREGABALIN 75 MG/1
150 CAPSULE ORAL ONCE
Status: COMPLETED | OUTPATIENT
Start: 2025-01-10 | End: 2025-01-10

## 2025-01-10 RX ORDER — LIDOCAINE HYDROCHLORIDE 10 MG/ML
0.5 INJECTION, SOLUTION EPIDURAL; INFILTRATION; INTRACAUDAL; PERINEURAL ONCE AS NEEDED
Status: DISCONTINUED | OUTPATIENT
Start: 2025-01-10 | End: 2025-01-10 | Stop reason: HOSPADM

## 2025-01-10 RX ORDER — SODIUM CHLORIDE 0.9 % (FLUSH) 0.9 %
10 SYRINGE (ML) INJECTION AS NEEDED
Status: DISCONTINUED | OUTPATIENT
Start: 2025-01-10 | End: 2025-01-10 | Stop reason: HOSPADM

## 2025-01-10 RX ORDER — EPINEPHRINE 1 MG/ML
INJECTION, SOLUTION INTRAMUSCULAR; SUBCUTANEOUS AS NEEDED
Status: DISCONTINUED | OUTPATIENT
Start: 2025-01-10 | End: 2025-01-10 | Stop reason: SURG

## 2025-01-10 RX ORDER — SODIUM CHLORIDE, SODIUM LACTATE, POTASSIUM CHLORIDE, CALCIUM CHLORIDE 600; 310; 30; 20 MG/100ML; MG/100ML; MG/100ML; MG/100ML
9 INJECTION, SOLUTION INTRAVENOUS CONTINUOUS
Status: DISCONTINUED | OUTPATIENT
Start: 2025-01-10 | End: 2025-01-10 | Stop reason: HOSPADM

## 2025-01-10 RX ORDER — DEXMEDETOMIDINE HYDROCHLORIDE 100 UG/ML
INJECTION, SOLUTION INTRAVENOUS
Status: COMPLETED | OUTPATIENT
Start: 2025-01-10 | End: 2025-01-10

## 2025-01-10 RX ORDER — HYDROCODONE BITARTRATE AND ACETAMINOPHEN 7.5; 325 MG/1; MG/1
1 TABLET ORAL ONCE AS NEEDED
Status: COMPLETED | OUTPATIENT
Start: 2025-01-10 | End: 2025-01-10

## 2025-01-10 RX ORDER — FENTANYL CITRATE 50 UG/ML
25 INJECTION, SOLUTION INTRAMUSCULAR; INTRAVENOUS
Status: DISCONTINUED | OUTPATIENT
Start: 2025-01-10 | End: 2025-01-10 | Stop reason: HOSPADM

## 2025-01-10 RX ORDER — FAMOTIDINE 10 MG/ML
20 INJECTION, SOLUTION INTRAVENOUS
Status: COMPLETED | OUTPATIENT
Start: 2025-01-10 | End: 2025-01-10

## 2025-01-10 RX ORDER — DEXAMETHASONE SODIUM PHOSPHATE 10 MG/ML
8 INJECTION INTRAMUSCULAR; INTRAVENOUS ONCE AS NEEDED
Status: COMPLETED | OUTPATIENT
Start: 2025-01-10 | End: 2025-01-10

## 2025-01-10 RX ORDER — SODIUM CHLORIDE, SODIUM LACTATE, POTASSIUM CHLORIDE, CALCIUM CHLORIDE 600; 310; 30; 20 MG/100ML; MG/100ML; MG/100ML; MG/100ML
100 INJECTION, SOLUTION INTRAVENOUS ONCE
Status: DISCONTINUED | OUTPATIENT
Start: 2025-01-10 | End: 2025-01-10 | Stop reason: HOSPADM

## 2025-01-10 RX ORDER — ONDANSETRON 4 MG/1
4 TABLET, FILM COATED ORAL EVERY 8 HOURS PRN
Qty: 30 TABLET | Refills: 0 | Status: SHIPPED | OUTPATIENT
Start: 2025-01-10

## 2025-01-10 RX ORDER — SUCCINYLCHOLINE CHLORIDE 20 MG/ML
INJECTION INTRAMUSCULAR; INTRAVENOUS AS NEEDED
Status: DISCONTINUED | OUTPATIENT
Start: 2025-01-10 | End: 2025-01-10 | Stop reason: SURG

## 2025-01-10 RX ORDER — BUPIVACAINE HYDROCHLORIDE 5 MG/ML
INJECTION, SOLUTION EPIDURAL; INTRACAUDAL
Status: COMPLETED | OUTPATIENT
Start: 2025-01-10 | End: 2025-01-10

## 2025-01-10 RX ORDER — LIDOCAINE HYDROCHLORIDE 20 MG/ML
INJECTION, SOLUTION INFILTRATION; PERINEURAL AS NEEDED
Status: DISCONTINUED | OUTPATIENT
Start: 2025-01-10 | End: 2025-01-10 | Stop reason: SURG

## 2025-01-10 RX ADMIN — DEXAMETHASONE SODIUM PHOSPHATE 8 MG: 10 INJECTION INTRAMUSCULAR; INTRAVENOUS at 09:53

## 2025-01-10 RX ADMIN — LIDOCAINE HYDROCHLORIDE 50 MG: 20 INJECTION, SOLUTION INFILTRATION; PERINEURAL at 11:59

## 2025-01-10 RX ADMIN — HYDROCODONE BITARTRATE AND ACETAMINOPHEN 1 TABLET: 7.5; 325 TABLET ORAL at 14:12

## 2025-01-10 RX ADMIN — PREGABALIN 150 MG: 75 CAPSULE ORAL at 09:54

## 2025-01-10 RX ADMIN — FAMOTIDINE 20 MG: 10 INJECTION INTRAVENOUS at 09:54

## 2025-01-10 RX ADMIN — ONDANSETRON 4 MG: 2 INJECTION INTRAMUSCULAR; INTRAVENOUS at 09:53

## 2025-01-10 RX ADMIN — FENTANYL CITRATE 50 MCG: 50 INJECTION, SOLUTION INTRAMUSCULAR; INTRAVENOUS at 11:59

## 2025-01-10 RX ADMIN — SODIUM CHLORIDE, POTASSIUM CHLORIDE, SODIUM LACTATE AND CALCIUM CHLORIDE 9 ML/HR: 600; 310; 30; 20 INJECTION, SOLUTION INTRAVENOUS at 09:54

## 2025-01-10 RX ADMIN — PROPOFOL 200 MG: 10 INJECTION, EMULSION INTRAVENOUS at 12:00

## 2025-01-10 RX ADMIN — BUPIVACAINE HYDROCHLORIDE 15 ML: 5 INJECTION, SOLUTION EPIDURAL; INTRACAUDAL; PERINEURAL at 11:39

## 2025-01-10 RX ADMIN — FENTANYL CITRATE 25 MCG: 50 INJECTION, SOLUTION INTRAMUSCULAR; INTRAVENOUS at 13:58

## 2025-01-10 RX ADMIN — EPINEPHRINE 10 MCG: 1 INJECTION INTRAMUSCULAR; INTRAVENOUS; SUBCUTANEOUS at 12:24

## 2025-01-10 RX ADMIN — MELOXICAM 15 MG: 7.5 TABLET ORAL at 09:54

## 2025-01-10 RX ADMIN — ACETAMINOPHEN 1000 MG: 500 TABLET ORAL at 09:54

## 2025-01-10 RX ADMIN — SUCCINYLCHOLINE CHLORIDE 120 MG: 20 INJECTION, SOLUTION INTRAMUSCULAR; INTRAVENOUS at 12:01

## 2025-01-10 RX ADMIN — DEXMEDETOMIDINE 25 MCG: 100 INJECTION, SOLUTION, CONCENTRATE INTRAVENOUS at 11:39

## 2025-01-10 RX ADMIN — FENTANYL CITRATE 25 MCG: 50 INJECTION, SOLUTION INTRAMUSCULAR; INTRAVENOUS at 14:09

## 2025-01-10 RX ADMIN — MIDAZOLAM HYDROCHLORIDE 1 MG: 1 INJECTION, SOLUTION INTRAMUSCULAR; INTRAVENOUS at 11:36

## 2025-01-10 RX ADMIN — MIDAZOLAM HYDROCHLORIDE 1 MG: 1 INJECTION, SOLUTION INTRAMUSCULAR; INTRAVENOUS at 11:26

## 2025-01-10 RX ADMIN — EPINEPHRINE 10 MCG: 1 INJECTION INTRAMUSCULAR; INTRAVENOUS; SUBCUTANEOUS at 12:07

## 2025-01-10 RX ADMIN — SODIUM CHLORIDE 3000 MG: 9 INJECTION, SOLUTION INTRAVENOUS at 12:03

## 2025-01-10 NOTE — DISCHARGE INSTRUCTIONS
Follow after-care instructions on page 1. Call Dr. Baez's office if you have any questions or concerns. Keep your follow-up appointment, listed on page 1.

## 2025-01-10 NOTE — ANESTHESIA PROCEDURE NOTES
Airway  Urgency: elective    Date/Time: 1/10/2025 12:02 PM  Airway not difficult    General Information and Staff    Patient location during procedure: OR  CRNA/CAA: Yael Vieyra CRNA    Indications and Patient Condition  Indications for airway management: airway protection    Preoxygenated: yes  Mask difficulty assessment: 1 - vent by mask    Final Airway Details  Final airway type: endotracheal airway      Successful airway: ETT  Cuffed: yes   Successful intubation technique: direct laryngoscopy  Facilitating devices/methods: intubating stylet  Endotracheal tube insertion site: oral  Blade: Johny  Blade size: 3.5  ETT size (mm): 7.5  Cormack-Lehane Classification: grade I - full view of glottis  Placement verified by: chest auscultation and capnometry   Measured from: lips  Number of attempts at approach: 1  Assessment: lips, teeth, and gum same as pre-op and atraumatic intubation

## 2025-01-10 NOTE — H&P
Orthopedic H&P    Subjective:     Patient ID: Alli Bello is a 54 y.o. male.    Chief Complaint:  Left shoulder pain, biceps tendinopathy, subacromial bursitis     History of Present Illness  History of Present Illness  The patient presents for surgical treatment of left shoulder pain, rotator cuff tendinitis, subacromial bursitis, biceps tendinopathy    He reports significant discomfort in his left shoulder for greater than 9 months. He does not recall any specific injury that might have triggered this pain. The discomfort is primarily located in the front of his shoulder, extending down into his arm, especially in the area of the biceps muscle. Activities such as resisted supination, lifting, pushing, and pulling, particularly in an overhead position, exacerbate the pain. He describes the pain as both shooting and aching, rating it as moderate to occasionally severe, or 6-9 out of 10.    Despite undergoing physical therapy for over 6 weeks and receiving a subacromial injection, he has seen minimal improvement. In fact, his pain has worsened with increased levels of activity. Over-the-counter anti-inflammatory medications have also failed to provide significant relief. He works as a  and reports no associated neck pain.      No current facility-administered medications on file prior to encounter.     Current Outpatient Medications on File Prior to Encounter   Medication Sig Dispense Refill    citalopram (CeleXA) 20 MG tablet TAKE 1 TABLET BY MOUTH EVERY MORNING (Patient taking differently: Take 1 tablet by mouth Every Morning. Take dos) 90 tablet 0    flecainide (TAMBOCOR) 50 MG tablet Take 1 tablet by mouth Every 12 (Twelve) Hours. (Patient taking differently: Take 2 tablets by mouth Daily. Take dos) 180 tablet 1    LORazepam (ATIVAN) 0.5 MG tablet Take 1/2-1 tab every 8 hours as needed for anxiety, panic attacks (Patient taking differently: Take 0.5-1 tablets by mouth Every 8 (Eight) Hours As Needed  for Anxiety. Take 1/2-1 tab every 8 hours as needed for anxiety, panic attacks) 60 tablet 1    metoprolol succinate XL (TOPROL-XL) 25 MG 24 hr tablet TAKE 2 TABLETS BY MOUTH DAILY (Patient taking differently: Take 2 tablets by mouth Daily. Take dos) 180 tablet 1     No Known Allergies         Social History     Occupational History    Occupation:    Tobacco Use    Smoking status: Never    Smokeless tobacco: Never    Tobacco comments:     caffine use   Vaping Use    Vaping status: Never Used   Substance and Sexual Activity    Alcohol use: Yes     Alcohol/week: 5.0 - 6.0 standard drinks of alcohol     Types: 5 - 6 Standard drinks or equivalent per week     Comment: 2-3 nights/week    Drug use: No    Sexual activity: Yes      Past Medical History:   Diagnosis Date    Achilles tendon injury     LEFT    Anxiety attack     Aortic stenosis     Atrial fibrillation     Deep venous thrombosis of calf     right    THOMAS (dyspnea on exertion)     since fib started    Fatigue     mild    Lightheaded     Malaise and fatigue     PAC (premature atrial contraction)     very rare    PAF (paroxysmal atrial fibrillation)     Palpitations     gets lightheaded with it    Pulmonary nodule     Sleep apnea     TO BRING DOS, CPAP    Snoring     no testing    SOB (shortness of breath)     mildly    Syncope     ONLY WITH A-FIB    Syncope and collapse      Past Surgical History:   Procedure Laterality Date    ACHILLES TENDON SURGERY Left 03/27/2019    Procedure: ACHILLES TENDON REPAIR;  Surgeon: Angel Baez MD;  Location: [facility] OR;  Service: Orthopedics    CARDIAC ELECTROPHYSIOLOGY PROCEDURE N/A 10/24/2022    Procedure: Ablation atrial fibrillation carto, cryo;  Surgeon: Vic Bradley MD;  Location: [facility] CATH INVASIVE LOCATION;  Service: Cardiovascular;  Laterality: N/A;    CARDIOVERSION  1998    D/T A FIB       Family History   Problem Relation Age of Onset    Arrhythmia Mother     Atrial fibrillation Mother      Heart disease Father     Hypertension Father     Coronary artery disease Father         CAD/CHD <59yo    Hyperlipidemia Father     Heart attack Father     Arrhythmia Brother     Atrial fibrillation Brother     Stroke Paternal Grandmother     Malig Hyperthermia Neg Hx          Review of Systems        Objective:  Vitals:    01/10/25 0847   Temp: 98.6 °F (37 °C)   TempSrc: Oral   Weight: 119 kg (262 lb 3.2 oz)         01/10/25  0847   Weight: 119 kg (262 lb 3.2 oz)     Body mass index is 31.09 kg/m².  Physical Exam    Vital signs reviewed.   General: No acute distress, alert and oriented  Eyes: conjunctiva clear; pupils equally round and reactive  ENT: external ears and nose atraumatic; oropharynx clear  CV: no peripheral edema  Resp: normal respiratory effort  Skin: no rashes or wounds; normal turgor  Psych: mood and affect appropriate; recent and remote memory intact  Debilities: None        Physical Exam  In the left shoulder, there is maximal tenderness to palpation anteriorly over the biceps tendon with significantly positive Yergason and Speed's as well as [place]'s. Active forward flexion is 170 degrees with 4 out of 5 strength. Active external rotation is 60 degrees, 4+ out of 5 strength, internal rotation. T10, 5 out of 5 strength and belly press test with negative bear hug sign. Mildly positive empty can test, positive Lozano and Neer's with associated crepitus. No tenderness over AC joint with negative cross arm test, positive deltoid L3 components negative drop arm test, negative external rotation lag sign. Brisk cap refill in all digits. 2+ radial pulse, left wrist.         Imaging:    XR Shoulder 2+ View Left    Result Date: 9/9/2024  Ordering physician's impression is located in the Encounter Note dated 09/09/24.         Reviewed outside x-rays from September 2024 for include review of MRI with reviewed imaging as well as radiology report indicates supraspinatus infraspinatus tendinosis, significant  biceps intra-articular tendinopathy.  No evidence of full-thickness rotator cuff tear.  No significant glenohumeral chondral loss or joint space narrowing.    Assessment:        1. Biceps tendinitis of left upper extremity    2. Tendinopathy of left rotator cuff           Plan:          Assessment & Plan    Given the failure of conservative treatment, he wishes to proceed with surgical treatment. Plan for surgery includes left shoulder arthroscopy with biceps tenodesis, subacromial decompression, possible rotator cuff repair, and all associated procedures. All questions answered.    Plan will be for left shoulder arthroscopy, biceps tenodesis, subacromial decompression, possible rotator cuff repair and all associated procedures.  I reviewed risks benefits and alternatives the procedure with risks including not limited to neurovascular damage, bleeding, infection, chronic pain, re-tear rotator cuff, failure of healing rotator cuff, loss of motion, weakness, stiffness, instability, biceps sag, DVT, pulmonary embolus, death, stroke, complex regional pain syndrome, myocardial infarction, need for additional procedures. He understood all these had all questions answered.  Patient consented to proceed with surgery.  No guarantees were given regarding results of surgery.     He has a history of a previous DVT in his right calf as well as atrial fibrillation. He is not on any anticoagulation currently as he has had an ablation. He reports no history of diabetes.      Alli Bello was in agreement with plan and had all questions answered.

## 2025-01-10 NOTE — ANESTHESIA POSTPROCEDURE EVALUATION
Patient: Alli Bello    Procedure Summary       Date: 01/10/25 Room / Location:  [facility] OR 3 /  [facility]    Anesthesia Start: 1153 Anesthesia Stop: 1330    Procedure: shoulder arthroscopy, biceps tenodesis, subacromial decompression, possible rotator cuff debridement versus repair and all associated procedures (Left: Shoulder) Diagnosis:       Biceps tendinitis of left upper extremity      Tendinopathy of left rotator cuff      (Biceps tendinitis of left upper extremity [M75.22])      (Tendinopathy of left rotator cuff [M67.912])    Surgeons: Angel Baez MD Provider: Yael Vieyra CRNA    Anesthesia Type: general with block ASA Status: 2            Anesthesia Type: general with block    Vitals  Vitals Value Taken Time   BP 98/65 01/10/25 1420   Temp 97.9 °F (36.6 °C) 01/10/25 1329   Pulse 64 01/10/25 1424   Resp 11 01/10/25 1420   SpO2 95 % 01/10/25 1424   Vitals shown include unfiled device data.        Post Anesthesia Care and Evaluation    Patient location during evaluation: bedside  Patient participation: complete - patient participated  Level of consciousness: awake and alert  Pain score: 1 (isolated to elbow)  Pain management: adequate    Airway patency: patent  Anesthetic complications: No anesthetic complications  PONV Status: none  Cardiovascular status: acceptable  Respiratory status: acceptable  Hydration status: acceptable

## 2025-01-10 NOTE — OP NOTE
Date of Operation: 1/10/2025     PREOPERATIVE DIAGNOSIS:  Left shoulder biceps tendinopathy  Left shoulder subacromial bursitis    POSTOPERATIVE DIAGNOSIS:    Left shoulder rotator cuff tear-subscapularis  Left shoulder biceps tendon rupture  Left shoulder subacromial bursitis  Left shoulder labral fraying  Left shoulder glenohumeral chondromalacia    PROCEDURE PERFORMED:   Left shoulder arthroscopic rotator cuff repair-subscapularis  Left shoulder arthroscopy with extensive debridement-biceps tendon, labrum, humeral head, glenoid, subacromial bursa     SURGEON: Angel Baez MD     ASSISTANT:   Assistant: Yudi Marquez CSA was responsible for performing the following activities: Retraction, Irrigation, Closing, Placing Dressing, and Held/Positioned Camera and their skilled assistance was necessary for the success of this case.     ANESTHESIA:  general with block       ESTIMATED BLOOD LOSS:  minimal     URINE OUTPUT: Not recorded.       FLUIDS: Per anesthesia.       COMPLICATIONS: None.       SPECIMENS: None.       DRAINS: None.      IMPLANTS: [employer] 4.5 mm Healicoil anchor x 1     ARTHROSCOPIC FINDINGS:   1.  Glenoid-grade 1/2 chondral wear superior glenoid  2.  Humeral head-2/3 chondral wear anterior superior humeral head adjacent to subscapularis tear  3.  Labrum-moderate degenerative fraying of anterior, superior, and posterior labrum with remnant of degenerative SLAP lesion of superior labrum at biceps anchor site  4.  Biceps tendon-ruptured with no evidence of residual biceps tendon in the proximal portion of the groove, 3 cm section of biceps tendon residually attached to the superior labrum  5.  Rotator cuff-full-thickness tear upper border subscapularis, mild partial-thickness articular sided fraying of supraspinatus less than 25% insertional thickness  6.  Axillary pouch-free loose bodies  7.  Subacromial space-moderately thickened subacromial bursa, no evidence of bursal sided tear or  significant degenerative change to AC joint     INDICATIONS FOR PROCEDURE: Patient is a pleasant 54 y.o. male who has had significant limitation and use of left shoulder as well as associated pain with failure of conservative treatments. I discussed treatment options available to the patient and patient wished to proceed with surgical treatment. I explained details of the procedure, as well as the risks, benefits, and alternatives as documented on history and physical, and the patient had all questions answered prior to signing the operative consent form. No guarantees were given in regard to results of the surgery.       DESCRIPTION OF PROCEDURE: The patient was seen, evaluated, and cleared for surgery by anesthesia. Admitted in the preoperative holding area. The operative site was marked, consent was reviewed, history and physical was updated, and preoperative labs were reviewed. A regional block was then placed per anesthesia. The patient was then taken to the operating room and placed in a supine position on a beachchair table. After successful intubation per anesthesia, facemask was placed securing head at this point time with the neck in normal anatomic position.  Patient was then elevated up into a seated position with neck maintained in normal anatomic alignment. All bony prominences were well-padded and patient was secured to the table with a waist strap. The left  upper extremity was then sterilely prepped and draped in a standard fashion.       A formal timeout was completed, including confirmation of History and Physical, operative consent, surgical site, patient identification number, and preoperative antibiotic administration.       Attention was then turned to creation of posterior portal with a 11 blade followed by insertion of blunt trocar and cannula.  Scope was inserted at this point time.  Anterior portal was then made in the rotator interval with spinal needle using outside in technique.   Cannula was then inserted over a trocar and diagnostic arthroscopic exam was carried out at this point in time with findings as noted above.     Attention was then turned to debridement of glenohumeral joint space including debridement of the superior, anterior, posterior labral fraying with combination of hand-held shaver and ablation wand.       Attention was then turned to debridement of the residual stump of the biceps tendon with biter as well as shaver and ablation wand at this point in time.  This was debrided to a smooth stable edge.    Attention was then turned to debridement of the glenoid and humeral head chondral wear with ablation wand as well as shaver debriding to a smooth stable edge with no evidence of residual delaminating chondral layer noted following debridement.      Attention was then turned to debridement of the undersurface partial-thickness articular sided tear of the supraspinatus with ablation wand at this time with tear involving less than 25% of the insertional thickness of the supraspinatus.    Attention was then turned to repair of the subscapularis tear.  Debridement of the subscapularis tendon at tear site was completed with ablation wand.  Anterior lateral portal was then established with spinal needle using outside in technique.  Grasper was then used to assess lateralization of the tendon which was noted to be appropriate at this time.  Debridement of lesser tuberosity insertion site was completed with ablation wand as well as shaver creating a bleeding bony bed to enhance biologic healing potential.  4.5 mm Healicoil anchor was then tapped and inserted at the articular margin.  Sutures were then passed in horizontal mattress fashion inferiorly and simple fashion superiorly.  These were then tied down and pairs with a sliding neck knot followed by 4 alternating half hitches ensuring good loop and knot security.  Sutures cut short at this time.  Repair was noted to be stable out  to 30 degrees of external rotation.     Attention was then turned to the subacromial space where the scope was inserted through the posterior portal, cannula inserted through the anterior portal and subacromial space was evaluated at this point in time.  Debridement of subacromial bursitis was completed with shaver as well as ablation wand at this point time with no evidence of bursal sided rotator cuff tear or laxity in the rotator cuff tendons to suggest intrasubstance tear.      Fluid was evacuated with suction and arthroscopic instruments were removed at this point time.     Attention was then turned to closure the wounds with 3-0 nylon in interrupted fashion.  Wounds were dressed with Xeroform gauze, 4 x 4, Tegaderm, ABD pad, Medipore tape and patient was placed in a sling with abduction pillow to the left side.     At the end of the procedure, all lap, needle, and sponge counts were correct x2. The patient had brisk capillary refill to all digits of the left upper extremity. Compartments were soft and easily compressible at the end of the procedure.       DISPOSITION: The patient was extubated per anesthesia and taken to the recovery room in stable condition. Will follow up in office in 1 week for wound check. Results discussed immediately after procedure with family and all questions were answered at that time.       REHAB: We will place patient on standard rotator cuff repair protocol, sling x 4 weeks, begin physical therapy at week 3.  No external rotation beyond neutral x 4 weeks, no external rotation beyond 30 degrees x 8 weeks.

## 2025-01-10 NOTE — ANESTHESIA PREPROCEDURE EVALUATION
Anesthesia Evaluation     Patient summary reviewed and Nursing notes reviewed   no history of anesthetic complications:   NPO Solid Status: > 8 hours  NPO Liquid Status: > 2 hours           Airway   Mallampati: III  TM distance: <3 FB  Neck ROM: limited  Possible difficult intubation  Dental - normal exam     Pulmonary - normal exam    breath sounds clear to auscultation  (+) ,sleep apnea on CPAP  (-) not a smoker  Cardiovascular - normal exam  Exercise tolerance: good (4-7 METS)    ECG reviewed  Patient on routine beta blocker and Beta blocker given within 24 hours of surgery  Rhythm: regular  Rate: normal    (+) valvular problems/murmurs MR and TI, dysrhythmias Atrial Fib, THOMAS, DVT resolved  (-) past MI, angina    ROS comment: 3/2022  · Calculated left ventricular EF = 56.5% Estimated left ventricular EF was in agreement with the calculated left ventricular EF. Left ventricular systolic function is normal.  · Left ventricular diastolic function was normal.  · Trace to mild mitral valve regurgitation is present.  · Trace to mild tricuspid valve regurgitation is present.  · Estimated right ventricular systolic pressure from tricuspid regurgitation is normal (<35 mmHg).    Sinus rhythm  When compared with ECG of 24-Oct-2022 10:03:52,  No significant change  Electronically Signed By: Jayme King ([facility]) 2024-12-20 19:08:33  Date and Time of Study:2024-12-20 09:17:23      Neuro/Psych  (+) syncope, psychiatric history Anxiety  GI/Hepatic/Renal/Endo - negative ROS     Musculoskeletal     (+) arthralgias (left shoulder), neck pain, neck stiffness      ROS comment: C6-c7 herniation    Abdominal  - normal exam    Abdomen: soft.   Substance History   (+) alcohol use (10-15 drinks per week)  (-) drug use     OB/GYN          Other      history of cancer (skin) remission    (-) arthritis                Anesthesia Plan    ASA 2     general with block     intravenous induction     Anesthetic plan, risks, benefits, and  alternatives have been provided, discussed and informed consent has been obtained with: patient and spouse/significant other.    Use of blood products discussed with patient and spouse/significant other  Consented to blood products.    Plan discussed with CRNA.    CODE STATUS:

## 2025-01-10 NOTE — ANESTHESIA PROCEDURE NOTES
Peripheral Block    Pre-sedation assessment completed: 1/10/2025 11:24 AM    Patient reassessed immediately prior to procedure    Patient location during procedure: pre-op  Start time: 1/10/2025 11:30 AM  Stop time: 1/10/2025 11:39 AM  Reason for block: at surgeon's request and post-op pain management  Performed by  DACIA/CAA: Yael Vieyra CRNA  Preanesthetic Checklist  Completed: patient identified, IV checked, site marked, risks and benefits discussed, surgical consent, monitors and equipment checked, pre-op evaluation and timeout performed  Prep:  Pt Position: supine  Sterile barriers:cap, gloves, mask and washed/disinfected hands  Prep: ChloraPrep  Patient monitoring: blood pressure monitoring, continuous pulse oximetry and EKG  Procedure    Sedation: yes  Performed under: local infiltration  Guidance:ultrasound guided    ULTRASOUND INTERPRETATION.  Using ultrasound guidance a 21 G gauge needle was placed in close proximity to the nerve, at which point, under ultrasound guidance anesthetic was injected in the area of the nerve and spread of the anesthesia was seen on ultrasound in close proximity thereto.  There were no abnormalities seen on ultrasound; a digital image was taken; and the patient tolerated the procedure with no complications. Images:still images obtained, printed/placed on chart    Laterality:left  Block Type:interscalene  Injection Technique:catheter  Needle Type:echogenic  Needle Gauge:21 G  Resistance on Injection: none  Catheter Size:20 G    Medications Used: bupivacaine PF (MARCAINE) injection 0.5% - Peripheral Nerve   15 mL - 1/10/2025 11:39:00 AM  dexmedetomidine HCl (PRECEDEX) injection - Perineural   25 mcg - 1/10/2025 11:39:00 AM      Post Assessment  Injection Assessment: negative aspiration for heme, no paresthesia on injection and incremental injection  Patient Tolerance:comfortable throughout block  Complications:no  Performed by: Yael Vieyra CRNA

## 2025-01-16 NOTE — PROGRESS NOTES
CC: F/u s/p left shoulder arthroscopic rotator cuff repair subscapularis, extensive debridement of biceps tendon, labrum, humeral head, glenoid, subacromial bursa, DOS 1/10/2025     Interval History: Patient returns to clinic stating pain is doing fairly well, has been using sling as instructed, denies any numbness or tingling over left arm. No fevers, chills, or sweats, and no drainage from incisions noted.    Exam:   Left shoulder- incisions clean, dry, sutures in place   Positive sensation all distributions left hand and proximal lateral aspect arm   Cap refill < 3 seconds, radial pulse 2+   Positive deltoid firing   Flex/extend fingers/thumb/wrist with 4+/5 strength, positive thumbs up, okay sign, cross finger adduction and abduction against resistance     Impression: s/p left shoulder arthroscopic rotator cuff repair subscapularis, extensive debridement of biceps tendon, labrum, humeral head, glenoid, subacromial bursa, DOS 1/10/2025      Plan:  1. D/c sutures today and replace with steri-strips- may shower, no submerging wounds x 4 weeks  2. F/u in 3 wks  3. Will start PT at 3 wk feliz, utilizing standard rotator cuff repair protocol, no external rotation beyond neutral x 4 weeks no external rotation beyond 30 degrees x 8 weeks, sling x 4 weeks total.  Work on finger and wrist ROM. May do gentle elbow ROM 2x/day while out of sling for showering or changing clothes.   4. All questions answered      No orders of the defined types were placed in this encounter.      Orders Placed This Encounter   Procedures    Ambulatory Referral to Physical Therapy for Evaluation & Treatment     Referral Priority:   Routine     Referral Type:   Physical Therapy     Referral Reason:   Specialty Services Required     Requested Specialty:   Physical Therapy     Number of Visits Requested:   1

## 2025-01-17 ENCOUNTER — OFFICE VISIT (OUTPATIENT)
Dept: ORTHOPEDIC SURGERY | Facility: CLINIC | Age: 55
End: 2025-01-17
Payer: COMMERCIAL

## 2025-01-17 VITALS — HEIGHT: 77 IN | BODY MASS INDEX: 30.94 KG/M2 | WEIGHT: 262 LBS

## 2025-01-17 DIAGNOSIS — Z98.890 STATUS POST ARTHROSCOPY OF SHOULDER: Primary | ICD-10-CM

## 2025-01-18 DIAGNOSIS — I48.0 PAF (PAROXYSMAL ATRIAL FIBRILLATION): ICD-10-CM

## 2025-01-20 RX ORDER — METOPROLOL SUCCINATE 25 MG/1
50 TABLET, EXTENDED RELEASE ORAL DAILY
Qty: 180 TABLET | Refills: 1 | Status: SHIPPED | OUTPATIENT
Start: 2025-01-20

## 2025-01-23 PROBLEM — M75.21 BICEPS TENDINITIS OF RIGHT UPPER EXTREMITY: Status: ACTIVE | Noted: 2025-01-23

## 2025-01-23 PROBLEM — M75.81 TENDINITIS OF RIGHT ROTATOR CUFF: Status: ACTIVE | Noted: 2025-01-23

## 2025-01-23 PROBLEM — M75.111 INCOMPLETE TEAR OF RIGHT ROTATOR CUFF: Status: ACTIVE | Noted: 2025-01-23

## 2025-02-17 ENCOUNTER — OFFICE VISIT (OUTPATIENT)
Dept: ORTHOPEDIC SURGERY | Facility: CLINIC | Age: 55
End: 2025-02-17
Payer: COMMERCIAL

## 2025-02-17 VITALS — HEIGHT: 77 IN | WEIGHT: 263 LBS | BODY MASS INDEX: 31.05 KG/M2

## 2025-02-17 DIAGNOSIS — Z98.890 STATUS POST ARTHROSCOPY OF SHOULDER: Primary | ICD-10-CM

## 2025-02-17 NOTE — PROGRESS NOTES
Subjective:     Patient ID: Alli Bello is a 54 y.o. male.    Chief Complaint:  Follow-up status post left shoulder arthroscopy with rotator cuff repair subscapularis and extensive debridement, 1/10/2025    History of Present Illness  The patient returns to the clinic today for a follow-up evaluation of his left shoulder.    He reports satisfactory progress in terms of motion, strength, and function at this juncture. He continues to adhere to the physical therapy regimen as prescribed and has discontinued the use of the sling.  Social History     Occupational History    Occupation:    Tobacco Use    Smoking status: Never     Passive exposure: Never    Smokeless tobacco: Never    Tobacco comments:     caffine use   Vaping Use    Vaping status: Never Used   Substance and Sexual Activity    Alcohol use: Not Currently     Alcohol/week: 16.0 standard drinks of alcohol     Types: 16 Standard drinks or equivalent per week     Comment: 3-4 NIGHTS WEEK    Drug use: No    Sexual activity: Yes      Past Medical History:   Diagnosis Date    Achilles tendon injury     LEFT    Anxiety attack     Aortic stenosis     Atrial fibrillation     Deep venous thrombosis of calf     right    THOMAS (dyspnea on exertion)     since fib started    Fatigue     mild    Lightheaded     Malaise and fatigue     PAC (premature atrial contraction)     very rare    PAF (paroxysmal atrial fibrillation)     Palpitations     gets lightheaded with it    Pulmonary nodule     Sleep apnea     TO BRING DOS, CPAP    Snoring     no testing    SOB (shortness of breath)     mildly    Syncope     ONLY WITH A-FIB    Syncope and collapse      Past Surgical History:   Procedure Laterality Date    ACHILLES TENDON SURGERY Left 03/27/2019    Procedure: ACHILLES TENDON REPAIR;  Surgeon: Angel Baez MD;  Location: [facility];  Service: Orthopedics    CARDIAC ELECTROPHYSIOLOGY PROCEDURE N/A 10/24/2022    Procedure: Ablation atrial fibrillation arjun,  "cryo;  Surgeon: Vic Bradley MD;  Location:  [facility];  Service: Cardiovascular;  Laterality: N/A;    CARDIOVERSION  1998    D/T A FIB    SHOULDER ARTHROSCOPY Left 1/10/2025    Procedure: shoulder arthroscopy, biceps tenodesis, subacromial decompression, possible rotator cuff debridement versus repair and all associated procedures;  Surgeon: Angel Baez MD;  Location:  [facility];  Service: Orthopedics;  Laterality: Left;       Family History   Problem Relation Age of Onset    Arrhythmia Mother     Atrial fibrillation Mother     Heart disease Father     Hypertension Father     Coronary artery disease Father         CAD/CHD <61yo    Hyperlipidemia Father     Heart attack Father     Arrhythmia Brother     Atrial fibrillation Brother     Stroke Paternal Grandmother     Malig Hyperthermia Neg Hx          Review of Systems        Objective:  Vitals:    02/17/25 0833   Weight: 119 kg (263 lb)   Height: 195.6 cm (77\")         02/17/25  0833   Weight: 119 kg (263 lb)     Body mass index is 31.19 kg/m².    Physical Exam    Vital signs reviewed.   General: No acute distress, alert and oriented  Eyes: conjunctiva clear; pupils equally round and reactive  ENT: external ears and nose atraumatic; oropharynx clear  CV: no peripheral edema  Resp: normal respiratory effort  Skin: no rashes or wounds; normal turgor  Psych: mood and affect appropriate; recent and remote memory intact       Physical Exam  The incision on the left shoulder is well healed. Active forward flexion is 160 degrees, passive forward flexion is 175 degrees, strength is 4 out of 5. Active external rotation is 50 degrees, passive external rotation is 55 degrees, strength is 4+ out of 5. Strength on belly press test is 4 out of 5 with a negative bear hug sign. Positive deltoid in all 3 components. Brisk capillary refill in all digits. Radial pulse in the left wrist is 2+.        Imaging:  None today    Assessment:        1. s/p " left shoulder arthroscopic rotator cuff repair subscapularis, extensive debridement of biceps tendon, labrum, humeral head, glenoid, subacromial bursa, DOS 1/10/2025             Assessment & Plan  1. Post-operative status of the left shoulder.  He is demonstrating satisfactory progress and is actively participating in his physical therapy regimen. He has expressed satisfaction with his current progress and all his queries have been addressed. He will persist with soft tissue massage, stretching, and light strengthening exercises, with further progression as per the established protocol.    Follow-up  The patient will follow up in 6 weeks for reassessment.    Alli Bello was in agreement with plan and had all questions answered.     Orders:  No orders of the defined types were placed in this encounter.      Medications:  No orders of the defined types were placed in this encounter.      Followup:  No follow-ups on file.    Diagnoses and all orders for this visit:    1. s/p left shoulder arthroscopic rotator cuff repair subscapularis, extensive debridement of biceps tendon, labrum, humeral head, glenoid, subacromial bursa, DOS 1/10/2025 (Primary)                   Dictated utilizing Dragon dictation     Patient or patient representative verbalized consent for the use of Ambient Listening during the visit with  Angel Baez MD for chart documentation. 2/17/2025  08:46 EST

## 2025-03-26 ENCOUNTER — OFFICE VISIT (OUTPATIENT)
Dept: ORTHOPEDIC SURGERY | Facility: CLINIC | Age: 55
End: 2025-03-26
Payer: COMMERCIAL

## 2025-03-26 VITALS — WEIGHT: 263 LBS | BODY MASS INDEX: 31.05 KG/M2 | HEIGHT: 77 IN

## 2025-03-26 DIAGNOSIS — Z98.890 STATUS POST ARTHROSCOPY OF SHOULDER: Primary | ICD-10-CM

## 2025-03-26 PROCEDURE — 99024 POSTOP FOLLOW-UP VISIT: CPT | Performed by: ORTHOPAEDIC SURGERY

## 2025-03-26 NOTE — PROGRESS NOTES
Subjective:     Patient ID: Alli Bello is a 55 y.o. male.    Chief Complaint:  Follow-up status post left shoulder arthroscopy with rotator cuff repair subscapularis and extensive debridement, 1/10/2025   History of Present Illness  The patient presents for a follow-up of his left shoulder.    He reports a positive trajectory in his recovery, although he continues to experience some initial pain and stenting when reaching certain positions, particularly with external rotation and overhead positioning. He also notes persistent tightness in his shoulder, which is gradually improving with therapy. He is not experiencing any numbness, tingling, fevers, chills, or sweats.      Social History     Occupational History    Occupation:    Tobacco Use    Smoking status: Never     Passive exposure: Never    Smokeless tobacco: Never    Tobacco comments:     caffine use   Vaping Use    Vaping status: Never Used   Substance and Sexual Activity    Alcohol use: Not Currently     Alcohol/week: 16.0 standard drinks of alcohol     Types: 16 Standard drinks or equivalent per week     Comment: 3-4 NIGHTS WEEK    Drug use: No    Sexual activity: Yes      Past Medical History:   Diagnosis Date    Achilles tendon injury     LEFT    Anxiety attack     Aortic stenosis     Atrial fibrillation     Deep venous thrombosis of calf     right    THOMAS (dyspnea on exertion)     since fib started    Fatigue     mild    Lightheaded     Malaise and fatigue     PAC (premature atrial contraction)     very rare    PAF (paroxysmal atrial fibrillation)     Palpitations     gets lightheaded with it    Pulmonary nodule     Sleep apnea     TO BRING DOS, CPAP    Snoring     no testing    SOB (shortness of breath)     mildly    Syncope     ONLY WITH A-FIB    Syncope and collapse      Past Surgical History:   Procedure Laterality Date    ACHILLES TENDON SURGERY Left 03/27/2019    Procedure: ACHILLES TENDON REPAIR;  Surgeon: Angel Baez MD;   "Location:  [facility];  Service: Orthopedics    CARDIAC ELECTROPHYSIOLOGY PROCEDURE N/A 10/24/2022    Procedure: Ablation atrial fibrillation carto, cryo;  Surgeon: Vic Bradley MD;  Location:  [facility];  Service: Cardiovascular;  Laterality: N/A;    CARDIOVERSION  1998    D/T A FIB    SHOULDER ARTHROSCOPY Left 1/10/2025    Procedure: shoulder arthroscopy, biceps tenodesis, subacromial decompression, possible rotator cuff debridement versus repair and all associated procedures;  Surgeon: Angel Baez MD;  Location:  [facility];  Service: Orthopedics;  Laterality: Left;       Family History   Problem Relation Age of Onset    Arrhythmia Mother     Atrial fibrillation Mother     Heart disease Father     Hypertension Father     Coronary artery disease Father         CAD/CHD <61yo    Hyperlipidemia Father     Heart attack Father     Arrhythmia Brother     Atrial fibrillation Brother     Stroke Paternal Grandmother     Malig Hyperthermia Neg Hx          Review of Systems        Objective:  Vitals:    03/26/25 0901   Weight: 119 kg (263 lb)   Height: 195.6 cm (77\")         03/26/25  0901   Weight: 119 kg (263 lb)     Body mass index is 31.19 kg/m².    Physical Exam    Vital signs reviewed.   General: No acute distress, alert and oriented  Eyes: conjunctiva clear; pupils equally round and reactive  ENT: external ears and nose atraumatic; oropharynx clear  CV: no peripheral edema  Resp: normal respiratory effort  Skin: no rashes or wounds; normal turgor  Psych: mood and affect appropriate; recent and remote memory intact       Physical Exam  The patient's left shoulder shows active forward flexion of 170 degrees, passive 175 degrees, with strength at 4+ out of 5. External rotation is 55 degrees, passive 60 degrees, with strength at 4+ out of 5. Strength is 5 out of 5 in belly press test with a negative bear hug sign and positive deltoid firing. All incisions on the shoulder are well healed.    "     Imaging:  None today  Assessment:        1. s/p left shoulder arthroscopic rotator cuff repair subscapularis, extensive debridement of biceps tendon, labrum, humeral head, glenoid, subacromial bursa, DOS 1/10/2025             Assessment & Plan  1. Left shoulder pain.  He is demonstrating progress in his condition. However, he continues to experience sensitivity during the initial range of motion and positioning. A regimen of prednisone taper followed by meloxicam was proposed, but he has chosen to defer this treatment at present. Although it may be slightly premature, a subacromial injection could be considered as an alternative. Currently, his pain is more of an inconvenience than a severe issue, leading him to opt for continued therapy, stretching exercises, and over-the-counter anti-inflammatories. All queries have been addressed. If there is an escalation in pain levels or a lack of further progress, he will contact the office to initiate the prednisone taper followed by meloxicam regimen.    Follow-up  The patient will follow up in 6 weeks for reassessment or sooner if needed.    Alli Bello was in agreement with plan and had all questions answered.     Orders:  No orders of the defined types were placed in this encounter.      Medications:  No orders of the defined types were placed in this encounter.      Followup:  Return in about 6 weeks (around 5/7/2025).    Diagnoses and all orders for this visit:    1. s/p left shoulder arthroscopic rotator cuff repair subscapularis, extensive debridement of biceps tendon, labrum, humeral head, glenoid, subacromial bursa, DOS 1/10/2025 (Primary)                   Dictated utilizing Dragon dictation     Patient or patient representative verbalized consent for the use of Ambient Listening during the visit with  Angel Baez MD for chart documentation. 3/26/2025  09:11 EDT

## 2025-05-07 ENCOUNTER — OFFICE VISIT (OUTPATIENT)
Dept: ORTHOPEDIC SURGERY | Facility: CLINIC | Age: 55
End: 2025-05-07
Payer: COMMERCIAL

## 2025-05-07 VITALS — HEIGHT: 77 IN | WEIGHT: 263 LBS | BODY MASS INDEX: 31.05 KG/M2

## 2025-05-07 DIAGNOSIS — Z98.890 STATUS POST ARTHROSCOPY OF SHOULDER: Primary | ICD-10-CM

## 2025-05-07 NOTE — PROGRESS NOTES
Subjective:     Patient ID: Alli Bello is a 55 y.o. male.    Chief Complaint:  Follow-up status post left shoulder arthroscopy with rotator cuff repair subscapularis and extensive debridement, 1/10/2025   History of Present Illness  History of Present Illness  The patient returns to the clinic today for a follow-up evaluation regarding his left shoulder.    He reports significant improvement in the previously experienced pain. He has increased mobility in his left shoulder, particularly with overhead activities, and notes a decrease in pain levels. The trajectory of his recovery appears positive. He continues to engage in home exercises aimed at strengthening the shoulder.     Social History     Occupational History    Occupation:    Tobacco Use    Smoking status: Never     Passive exposure: Never    Smokeless tobacco: Never    Tobacco comments:     caffine use   Vaping Use    Vaping status: Never Used   Substance and Sexual Activity    Alcohol use: Not Currently     Alcohol/week: 16.0 standard drinks of alcohol     Types: 16 Standard drinks or equivalent per week     Comment: 3-4 NIGHTS WEEK    Drug use: No    Sexual activity: Yes      Past Medical History:   Diagnosis Date    Achilles tendon injury     LEFT    Anxiety attack     Aortic stenosis     Atrial fibrillation     Deep venous thrombosis of calf     right    THOMAS (dyspnea on exertion)     since fib started    Fatigue     mild    Lightheaded     Malaise and fatigue     PAC (premature atrial contraction)     very rare    PAF (paroxysmal atrial fibrillation)     Palpitations     gets lightheaded with it    Pulmonary nodule     Sleep apnea     TO BRING DOS, CPAP    Snoring     no testing    SOB (shortness of breath)     mildly    Syncope     ONLY WITH A-FIB    Syncope and collapse      Past Surgical History:   Procedure Laterality Date    ACHILLES TENDON SURGERY Left 03/27/2019    Procedure: ACHILLES TENDON REPAIR;  Surgeon: Angel Baez  "MD;  Location:  [facility];  Service: Orthopedics    CARDIAC ELECTROPHYSIOLOGY PROCEDURE N/A 10/24/2022    Procedure: Ablation atrial fibrillation carto, cryo;  Surgeon: Vic Bradley MD;  Location:  [facility];  Service: Cardiovascular;  Laterality: N/A;    CARDIOVERSION  1998    D/T A FIB    SHOULDER ARTHROSCOPY Left 1/10/2025    Procedure: shoulder arthroscopy, biceps tenodesis, subacromial decompression, possible rotator cuff debridement versus repair and all associated procedures;  Surgeon: Angel Baez MD;  Location:  [facility];  Service: Orthopedics;  Laterality: Left;       Family History   Problem Relation Age of Onset    Arrhythmia Mother     Atrial fibrillation Mother     Heart disease Father     Hypertension Father     Coronary artery disease Father         CAD/CHD <61yo    Hyperlipidemia Father     Heart attack Father     Arrhythmia Brother     Atrial fibrillation Brother     Stroke Paternal Grandmother     Malig Hyperthermia Neg Hx          Review of Systems        Objective:  Vitals:    05/07/25 0842   Weight: 119 kg (263 lb)   Height: 195.6 cm (77\")         05/07/25  0842   Weight: 119 kg (263 lb)     Body mass index is 31.19 kg/m².  General: No acute distress.  Resp: normal respiratory effort  Skin: no rashes or wounds; normal turgor  Psych: mood and affect appropriate; recent and remote memory intact          Physical Exam    Left shoulder-  - Incisions on the left shoulder are well healed  - Active forward flexion: 175 degrees with 4+ out of 5 strength  - External rotation: 65 degrees with 4+ out of 5 strength  - Internal rotation: reaches T10 with 5 out of 5 strength  - Belly press test: positive deltoid firing in all 3 components         Imaging:  None today  Assessment:        1. s/p left shoulder arthroscopic rotator cuff repair subscapularis, extensive debridement of biceps tendon, labrum, humeral head, glenoid, subacromial bursa, DOS 1/10/2025       "     Plan:          Assessment & Plan  Postoperative status of the left shoulder.  He is demonstrating satisfactory progress with his left shoulder, exhibiting significant improvement through consistent engagement in therapy and soft tissue massage. He expresses contentment with his overall recovery, despite minor residual limitations, which he perceives as progressively diminishing. At this juncture, there is no necessity for additional medication as previously deliberated. Follow-up for the left shoulder will be as needed.    Right shoulder pain.  He has reported increasing levels of pain in his right shoulder. MRI evidence indicates biceps subluxation. Home exercises have been provided to address this issue, as documented in today's discharge summary. He has expressed agreement with this plan and all his queries have been addressed.    Follow-up  The patient will follow up in 4 months for an evaluation of his right shoulder.    Alli Bello was in agreement with plan and had all questions answered.     Orders:  No orders of the defined types were placed in this encounter.      Medications:  No orders of the defined types were placed in this encounter.      Followup:  Return if symptoms worsen or fail to improve.    Diagnoses and all orders for this visit:    1. s/p left shoulder arthroscopic rotator cuff repair subscapularis, extensive debridement of biceps tendon, labrum, humeral head, glenoid, subacromial bursa, DOS 1/10/2025 (Primary)                  Dictated utilizing Dragon dictation     Patient or patient representative verbalized consent for the use of Ambient Listening during the visit with  Angel Baez MD for chart documentation. 5/7/2025  09:13 EDT

## (undated) DEVICE — WEREWOLF FLOW 90 COBLATION WAND: Brand: COBLATION

## (undated) DEVICE — TOWEL,OR,DSP,ST,BLUE,STD,4/PK,20PK/CS: Brand: MEDLINE

## (undated) DEVICE — WRAP SHLDR COMPR COLD/THERP

## (undated) DEVICE — TRANSFER SET 3": Brand: MEDLINE INDUSTRIES, INC.

## (undated) DEVICE — SOL ISO/ALC RUB 70PCT 4OZ

## (undated) DEVICE — Device

## (undated) DEVICE — DRAPE,TOWEL,LARGE,INVISISHIELD: Brand: MEDLINE

## (undated) DEVICE — CABL CONN CATH EP UMB 48IN

## (undated) DEVICE — GLV SURG SENSICARE PI LF PF 7.0

## (undated) DEVICE — BLANKT WARM UNDER/BDY FUL/ACC A/ 90X206CM

## (undated) DEVICE — SYR CONTRL LUERLOK 10CC

## (undated) DEVICE — HYPODERMIC SAFETY NEEDLE: Brand: MAGELLAN

## (undated) DEVICE — PK BASIC ORTHO 90

## (undated) DEVICE — OCCLUSIVE GAUZE PATCH,3% BISMUTH TRIBROMOPHENATE IN PETROLATUM BLEND: Brand: XEROFORM

## (undated) DEVICE — CATH ABL ACHIEVE MP 3.3F20MM 165CM

## (undated) DEVICE — PINNACLE INTRODUCER SHEATH: Brand: PINNACLE

## (undated) DEVICE — DRAPE,REIN 53X77,STERILE: Brand: MEDLINE

## (undated) DEVICE — BOWL PLSTC LG 32OZ BLU STRL

## (undated) DEVICE — DISPOSABLE TOURNIQUET CUFF SINGLE BLADDER, SINGLE PORT AND LUER LOCK CONNECTOR: Brand: COLOR CUFF

## (undated) DEVICE — TRY SKINPREP WET CHG 4PCT SPNG HIB

## (undated) DEVICE — UNDERCAST PADDING: Brand: DEROYAL

## (undated) DEVICE — DRSNG PAD ABD 8X10IN STRL

## (undated) DEVICE — Device: Brand: REFERENCE PATCH CARTO 3

## (undated) DEVICE — SUT MNCRYL 3/0 PS2 27IN Y427H

## (undated) DEVICE — INTENT TO BE USED WITH SUTURE MATERIAL FOR TISSUE CLOSURE: Brand: RICHARD-ALLAN® NEEDLE 1/2 CIRCLE TAPER

## (undated) DEVICE — NDL SPINE 22G 31/2IN BLK

## (undated) DEVICE — PK SHLDR ARTHSCP 90

## (undated) DEVICE — SYR LUERLOK 50ML

## (undated) DEVICE — STRAP,POSITIONING,KNEE/BODY,FOAM,4X60": Brand: MEDLINE

## (undated) DEVICE — CANNULA THREADED FLEX 5.5 X 72MM: Brand: CLEAR-TRAC

## (undated) DEVICE — INTENDED FOR TISSUE SEPARATION, AND OTHER PROCEDURES THAT REQUIRE A SHARP SURGICAL BLADE TO PUNCTURE OR CUT.: Brand: BARD-PARKER ® STAINLESS STEEL BLADES

## (undated) DEVICE — GLV SURG SENSICARE PI PF LF 7 GRN STRL

## (undated) DEVICE — SUT VIC 3/0 SH CR8 18IN J864D

## (undated) DEVICE — SPNG GZ WOVN 4X4IN 12PLY 10/BX STRL

## (undated) DEVICE — SYR LUERLOK 20CC BX/50

## (undated) DEVICE — SLNG ULTRASLING3 13TO15IN LG

## (undated) DEVICE — SUT ETHLN 3/0 PS2 18 IN 1669H

## (undated) DEVICE — GLV SURG NEOLON 2G PF LF 7.5 STRL

## (undated) DEVICE — CATH CRYO/ABL ARCTICFRONTADVANCE MAP 12F 28MM

## (undated) DEVICE — Device: Brand: SOUNDSTAR

## (undated) DEVICE — T-MAX DISPOSABLE FACE MASK 8 PER BOX

## (undated) DEVICE — BNDG ESMARK 6INX9FT STRL

## (undated) DEVICE — DRP SURG U/DRP INVISISHIELD PA 48X52IN

## (undated) DEVICE — ACCU-PASS SUTURE SHUTTLE 45                                    DEGREE, UPBEND, STERILE: Brand: ACCU-PASS

## (undated) DEVICE — RL COTN ABSORB/COMPR 1/2LB 6IN 13FT 1PU

## (undated) DEVICE — 1 X VERSACROSS LARGE ACCESS TRANSSEPTAL DILATOR (INCLUDING 1 X J-TIP MECHANICAL GUIDEWIRE); 1 X VERSACROSS RF WIRE (INCLUDING 1 X CONNECTOR CABLE (SINGLE USE)); 1 X DISPERSIVE ELECTRODE: Brand: VERSACROSS LARGE ACCESS SOLUTION

## (undated) DEVICE — CLEARSIGHT FINGER CUFF MEDIUM MULTI PACK: Brand: CLEARSIGHT

## (undated) DEVICE — OCTA,PERSEID,2-2-2-2-2,F-CURVE: Brand: OCTARAY MAPPING CATHETER

## (undated) DEVICE — Device: Brand: FOG GUARD ANTI-FOG SOLUTION WITH SPONGE

## (undated) DEVICE — GLV SURG SENSICARE W/ALOE PF LF 7.5 STRL

## (undated) DEVICE — CANNULA THREADED FLEX 8.0 X 72MM: Brand: CLEAR-TRAC

## (undated) DEVICE — GLV SURG SENSICARE PF POLYISPRN SZ8 LF

## (undated) DEVICE — APPL CHLORAPREP W/TINT 26ML ORNG

## (undated) DEVICE — DRSNG TELFA PAD NONADH STR 1S 3X8IN

## (undated) DEVICE — SPNG LAP 18X18IN LF STRL PK/5

## (undated) DEVICE — DECANTER BAG 9": Brand: MEDLINE INDUSTRIES, INC.

## (undated) DEVICE — INTENDED TO SUPPORT AND MAINTAIN THE POSITION OF AN ANESTHETIZED PATIENT DURING SURGERY: Brand: MARCO SHOULDER STABILIZATION KIT

## (undated) DEVICE — SUT VIC 2/0 CP2 CR8 18IN J762D

## (undated) DEVICE — IRRIGATOR BULB 60CC

## (undated) DEVICE — SOL IRR H2O BO 1000ML STRL

## (undated) DEVICE — PREP SOL POVIDONE/IODINE BT 4OZ

## (undated) DEVICE — SUT VIC 0 CT1 CR8 18IN J840D

## (undated) DEVICE — BNDG ELAS MATRX V/CLS 4IN 5YD LF

## (undated) DEVICE — ST TB GOFLO STRL

## (undated) DEVICE — SPLNT 1 STEP 4INX30IN

## (undated) DEVICE — 4.5 FULL RADIUS BONECUTTER BLADES,                                    YELLOW, 8000 MAXIMUM RPM, PACKAGED 6                                    PER BOX, STERILE

## (undated) DEVICE — GLV SURG SENSICARE ORTHO PF LF 7 STRL

## (undated) DEVICE — CABL CATH ABLAT ACHIEVE 196CM 1P/U

## (undated) DEVICE — Device: Brand: DECANAV

## (undated) DEVICE — INTRO SHEATH STEER BIDIR W/GW

## (undated) DEVICE — CABL CONN CATH EP COAXL UMB 72IN

## (undated) DEVICE — BNDG ELAS ELITE V/CLOSE 6IN 5YD LF STRL

## (undated) DEVICE — PAD CAST SOF ROL NS 4IN

## (undated) DEVICE — LOU EP: Brand: MEDLINE INDUSTRIES, INC.

## (undated) DEVICE — SUT ETHLN 3-0 FS118IN 663H